# Patient Record
Sex: MALE | Race: OTHER | HISPANIC OR LATINO
[De-identification: names, ages, dates, MRNs, and addresses within clinical notes are randomized per-mention and may not be internally consistent; named-entity substitution may affect disease eponyms.]

---

## 2019-06-10 ENCOUNTER — NON-APPOINTMENT (OUTPATIENT)
Age: 59
End: 2019-06-10

## 2019-06-10 ENCOUNTER — APPOINTMENT (OUTPATIENT)
Dept: HEART AND VASCULAR | Facility: CLINIC | Age: 59
End: 2019-06-10
Payer: COMMERCIAL

## 2019-06-10 DIAGNOSIS — Z82.49 FAMILY HISTORY OF ISCHEMIC HEART DISEASE AND OTHER DISEASES OF THE CIRCULATORY SYSTEM: ICD-10-CM

## 2019-06-10 DIAGNOSIS — R93.1 ABNORMAL FINDINGS ON DIAGNOSTIC IMAGING OF HEART AND CORONARY CIRCULATION: ICD-10-CM

## 2019-06-10 DIAGNOSIS — Z87.898 PERSONAL HISTORY OF OTHER SPECIFIED CONDITIONS: ICD-10-CM

## 2019-06-10 DIAGNOSIS — Z87.2 PERSONAL HISTORY OF DISEASES OF THE SKIN AND SUBCUTANEOUS TISSUE: ICD-10-CM

## 2019-06-10 PROCEDURE — 93000 ELECTROCARDIOGRAM COMPLETE: CPT

## 2019-06-10 PROCEDURE — 99204 OFFICE O/P NEW MOD 45 MIN: CPT

## 2019-06-10 NOTE — REASON FOR VISIT
[Initial Evaluation] : an initial evaluation of [Abnormal ECG] : an abnormal ECG [Hyperlipidemia] : hyperlipidemia [Coronary Artery Disease] : coronary artery disease [Hypertension] : hypertension

## 2019-06-10 NOTE — HISTORY OF PRESENT ILLNESS
[FreeTextEntry1] : Esteban Marino is a 57 yo male who presents for Cv evaluation.  He was seen by cardiologist in the [facility].\par \par He denies cp.  He has SOB with exertion along with palps.  He denies pnd, orthopnea, edema, or loc.\par \par He is active but not exercising.  He is compliant with meds.\par \par ECG today reveals NSR, LVH, NS ST T changes.\par \par Needs EXSE, CPET, more aggressive CV risk factor management.  Collect records.

## 2019-06-10 NOTE — DISCUSSION/SUMMARY
[Left Ventricular Hypertrophy] : left ventricular hypertrophy [Myocardial Ischemia] : myocardial ischemia [Anginal Equivalent] : anginal equivalent [Coronary Artery Disease] : coronary artery disease [Possible Cardiac Ischemia (Intermd Prob)] : possible cardiac ischemia (intermediate probability) [Dietary Modification] : dietary modification [Weight Reduction] : weight reduction [Hyperlipidemia] : hyperlipidemia [Deteriorating] : deteriorating [Exercise] : exercise [Diet Modification] : diet modification [Hypertension] : hypertension [Stable] : stable [None] : none [Exercise Regimen] : an exercise regimen [Weight Loss] : weight loss [Sodium Restriction] : sodium restriction [Patient] : the patient [de-identified] : prior hx of PCI

## 2019-06-10 NOTE — PHYSICAL EXAM
[General Appearance - Well Developed] : well developed [Normal Appearance] : normal appearance [General Appearance - Well Nourished] : well nourished [No Deformities] : no deformities [Well Groomed] : well groomed [General Appearance - In No Acute Distress] : no acute distress [Normal Conjunctiva] : the conjunctiva exhibited no abnormalities [Normal Oral Mucosa] : normal oral mucosa [Eyelids - No Xanthelasma] : the eyelids demonstrated no xanthelasmas [Normal Jugular Venous A Waves Present] : normal jugular venous A waves present [No Oral Pallor] : no oral pallor [No Oral Cyanosis] : no oral cyanosis [Normal Jugular Venous V Waves Present] : normal jugular venous V waves present [No Jugular Venous Reddy A Waves] : no jugular venous reddy A waves [Heart Rate And Rhythm] : heart rate and rhythm were normal [Heart Sounds] : normal S1 and S2 [Murmurs] : no murmurs present [Respiration, Rhythm And Depth] : normal respiratory rhythm and effort [Auscultation Breath Sounds / Voice Sounds] : lungs were clear to auscultation bilaterally [Exaggerated Use Of Accessory Muscles For Inspiration] : no accessory muscle use [Abdomen Tenderness] : non-tender [Abdomen Soft] : soft [Abdomen Mass (___ Cm)] : no abdominal mass palpated [Abnormal Walk] : normal gait [Cyanosis, Localized] : no localized cyanosis [Gait - Sufficient For Exercise Testing] : the gait was sufficient for exercise testing [Nail Clubbing] : no clubbing of the fingernails [Petechial Hemorrhages (___cm)] : no petechial hemorrhages [] : no rash [Skin Color & Pigmentation] : normal skin color and pigmentation [No Venous Stasis] : no venous stasis [No Skin Ulcers] : no skin ulcer [Skin Lesions] : no skin lesions [No Xanthoma] : no  xanthoma was observed

## 2019-06-24 ENCOUNTER — APPOINTMENT (OUTPATIENT)
Dept: HEART AND VASCULAR | Facility: CLINIC | Age: 59
End: 2019-06-24

## 2019-06-28 ENCOUNTER — APPOINTMENT (OUTPATIENT)
Dept: HEART AND VASCULAR | Facility: CLINIC | Age: 59
End: 2019-06-28

## 2019-08-05 ENCOUNTER — APPOINTMENT (OUTPATIENT)
Dept: HEART AND VASCULAR | Facility: CLINIC | Age: 59
End: 2019-08-05
Payer: COMMERCIAL

## 2019-08-05 VITALS
WEIGHT: 240 LBS | SYSTOLIC BLOOD PRESSURE: 158 MMHG | DIASTOLIC BLOOD PRESSURE: 90 MMHG | HEIGHT: 65 IN | BODY MASS INDEX: 39.99 KG/M2 | HEART RATE: 82 BPM

## 2019-08-05 PROCEDURE — 93320 DOPPLER ECHO COMPLETE: CPT

## 2019-08-05 PROCEDURE — 93351 STRESS TTE COMPLETE: CPT

## 2019-08-05 PROCEDURE — 93325 DOPPLER ECHO COLOR FLOW MAPG: CPT

## 2019-08-06 ENCOUNTER — RESULT REVIEW (OUTPATIENT)
Age: 59
End: 2019-08-06

## 2019-09-03 ENCOUNTER — APPOINTMENT (OUTPATIENT)
Dept: HEART AND VASCULAR | Facility: CLINIC | Age: 59
End: 2019-09-03
Payer: COMMERCIAL

## 2019-09-03 PROCEDURE — 94010 BREATHING CAPACITY TEST: CPT | Mod: 59

## 2019-09-03 PROCEDURE — 94621 CARDIOPULM EXERCISE TESTING: CPT

## 2019-09-03 PROCEDURE — 94729 DIFFUSING CAPACITY: CPT

## 2019-09-09 ENCOUNTER — APPOINTMENT (OUTPATIENT)
Dept: HEART AND VASCULAR | Facility: CLINIC | Age: 59
End: 2019-09-09

## 2019-10-03 ENCOUNTER — APPOINTMENT (OUTPATIENT)
Dept: HEART AND VASCULAR | Facility: CLINIC | Age: 59
End: 2019-10-03
Payer: COMMERCIAL

## 2019-10-03 VITALS
RESPIRATION RATE: 16 BRPM | HEIGHT: 65 IN | WEIGHT: 246 LBS | SYSTOLIC BLOOD PRESSURE: 152 MMHG | DIASTOLIC BLOOD PRESSURE: 90 MMHG | HEART RATE: 68 BPM | BODY MASS INDEX: 40.98 KG/M2

## 2019-10-03 PROCEDURE — 99214 OFFICE O/P EST MOD 30 MIN: CPT

## 2019-10-04 LAB
ALBUMIN SERPL ELPH-MCNC: 4.5 G/DL
ALP BLD-CCNC: 116 U/L
ALT SERPL-CCNC: 53 U/L
ANION GAP SERPL CALC-SCNC: 15 MMOL/L
AST SERPL-CCNC: 30 U/L
BILIRUB SERPL-MCNC: 0.5 MG/DL
BUN SERPL-MCNC: 16 MG/DL
CALCIUM SERPL-MCNC: 10 MG/DL
CHLORIDE SERPL-SCNC: 99 MMOL/L
CO2 SERPL-SCNC: 24 MMOL/L
CREAT SERPL-MCNC: 0.97 MG/DL
GLUCOSE SERPL-MCNC: 86 MG/DL
POTASSIUM SERPL-SCNC: 3.3 MMOL/L
PROT SERPL-MCNC: 7.9 G/DL
SODIUM SERPL-SCNC: 138 MMOL/L

## 2019-10-04 NOTE — DISCUSSION/SUMMARY
[Myocardial Ischemia] : myocardial ischemia [Left Ventricular Hypertrophy] : left ventricular hypertrophy [Coronary Artery Disease] : coronary artery disease [Anginal Equivalent] : anginal equivalent [Possible Cardiac Ischemia (Intermd Prob)] : possible cardiac ischemia (intermediate probability) [Dietary Modification] : dietary modification [Weight Reduction] : weight reduction [Hyperlipidemia] : hyperlipidemia [Deteriorating] : deteriorating [Diet Modification] : diet modification [Exercise] : exercise [Hypertension] : hypertension [None] : none [Stable] : stable [Weight Loss] : weight loss [Exercise Regimen] : an exercise regimen [Sodium Restriction] : sodium restriction [Patient] : the patient [de-identified] : prior hx of PCI

## 2019-10-04 NOTE — REASON FOR VISIT
[Initial Evaluation] : an initial evaluation of [Coronary Artery Disease] : coronary artery disease [Abnormal ECG] : an abnormal ECG [Hyperlipidemia] : hyperlipidemia [Hypertension] : hypertension

## 2019-10-04 NOTE — PHYSICAL EXAM
[General Appearance - Well Developed] : well developed [Normal Appearance] : normal appearance [Well Groomed] : well groomed [No Deformities] : no deformities [General Appearance - Well Nourished] : well nourished [General Appearance - In No Acute Distress] : no acute distress [Normal Conjunctiva] : the conjunctiva exhibited no abnormalities [Eyelids - No Xanthelasma] : the eyelids demonstrated no xanthelasmas [Normal Oral Mucosa] : normal oral mucosa [No Oral Pallor] : no oral pallor [No Oral Cyanosis] : no oral cyanosis [Normal Jugular Venous A Waves Present] : normal jugular venous A waves present [Normal Jugular Venous V Waves Present] : normal jugular venous V waves present [No Jugular Venous Reddy A Waves] : no jugular venous reddy A waves [Exaggerated Use Of Accessory Muscles For Inspiration] : no accessory muscle use [Respiration, Rhythm And Depth] : normal respiratory rhythm and effort [Auscultation Breath Sounds / Voice Sounds] : lungs were clear to auscultation bilaterally [Heart Rate And Rhythm] : heart rate and rhythm were normal [Heart Sounds] : normal S1 and S2 [Murmurs] : no murmurs present [Abdomen Soft] : soft [Abdomen Tenderness] : non-tender [Abdomen Mass (___ Cm)] : no abdominal mass palpated [Abnormal Walk] : normal gait [Nail Clubbing] : no clubbing of the fingernails [Gait - Sufficient For Exercise Testing] : the gait was sufficient for exercise testing [Cyanosis, Localized] : no localized cyanosis [Petechial Hemorrhages (___cm)] : no petechial hemorrhages [Skin Color & Pigmentation] : normal skin color and pigmentation [] : no rash [No Venous Stasis] : no venous stasis [Skin Lesions] : no skin lesions [No Xanthoma] : no  xanthoma was observed [No Skin Ulcers] : no skin ulcer

## 2019-10-04 NOTE — HISTORY OF PRESENT ILLNESS
[FreeTextEntry1] : Esteban Marino returns for follow up.  \par \par He denies cp.  He has SOB with exertion along with palps.  He denies pnd, orthopnea, edema, or loc.\par \par He is active but not exercising.  He is compliant with meds.\par \par EXSE 8/2019: nl lv sys fxn; nl ruelas fxn; 7:05 min Anil; no ischemia; PVC in recovery\par CPET 9/2019:  ischemic myocardial dysfxn; 84% predicted peak VO2\par \par Reviewed results in detail.  Recommend CTA.

## 2019-10-17 ENCOUNTER — RESULT REVIEW (OUTPATIENT)
Age: 59
End: 2019-10-17

## 2019-10-18 ENCOUNTER — APPOINTMENT (OUTPATIENT)
Dept: HEART AND VASCULAR | Facility: CLINIC | Age: 59
End: 2019-10-18
Payer: COMMERCIAL

## 2019-10-18 PROCEDURE — 36415 COLL VENOUS BLD VENIPUNCTURE: CPT

## 2019-10-21 LAB
ALBUMIN SERPL ELPH-MCNC: 4.2 G/DL
ALP BLD-CCNC: 108 U/L
ALT SERPL-CCNC: 27 U/L
ANION GAP SERPL CALC-SCNC: 17 MMOL/L
APPEARANCE: CLEAR
AST SERPL-CCNC: 20 U/L
BASOPHILS # BLD AUTO: 0.08 K/UL
BASOPHILS NFR BLD AUTO: 0.6 %
BILIRUB SERPL-MCNC: 0.4 MG/DL
BILIRUBIN URINE: NEGATIVE
BLOOD URINE: NEGATIVE
BUN SERPL-MCNC: 19 MG/DL
CALCIUM SERPL-MCNC: 9.7 MG/DL
CHLORIDE SERPL-SCNC: 101 MMOL/L
CHOLEST SERPL-MCNC: 119 MG/DL
CHOLEST/HDLC SERPL: 3.3 RATIO
CK SERPL-CCNC: 126 U/L
CO2 SERPL-SCNC: 22 MMOL/L
COLOR: YELLOW
CREAT SERPL-MCNC: 0.92 MG/DL
EOSINOPHIL # BLD AUTO: 0.33 K/UL
EOSINOPHIL NFR BLD AUTO: 2.6 %
GLUCOSE QUALITATIVE U: NEGATIVE
GLUCOSE SERPL-MCNC: 71 MG/DL
HCT VFR BLD CALC: 48.5 %
HDLC SERPL-MCNC: 36 MG/DL
HGB BLD-MCNC: 15.6 G/DL
IMM GRANULOCYTES NFR BLD AUTO: 1.2 %
INR PPP: 1.07 RATIO
KETONES URINE: NEGATIVE
LDLC SERPL CALC-MCNC: 60 MG/DL
LEUKOCYTE ESTERASE URINE: NEGATIVE
LYMPHOCYTES # BLD AUTO: 3.29 K/UL
LYMPHOCYTES NFR BLD AUTO: 25.8 %
MAN DIFF?: NORMAL
MCHC RBC-ENTMCNC: 29.2 PG
MCHC RBC-ENTMCNC: 32.2 GM/DL
MCV RBC AUTO: 90.7 FL
MONOCYTES # BLD AUTO: 0.92 K/UL
MONOCYTES NFR BLD AUTO: 7.2 %
NEUTROPHILS # BLD AUTO: 7.98 K/UL
NEUTROPHILS NFR BLD AUTO: 62.6 %
NITRITE URINE: NEGATIVE
PH URINE: 6.5
PLATELET # BLD AUTO: 366 K/UL
POTASSIUM SERPL-SCNC: 3.4 MMOL/L
PROT SERPL-MCNC: 8.1 G/DL
PROTEIN URINE: NEGATIVE
PT BLD: 12.1 SEC
RBC # BLD: 5.35 M/UL
RBC # FLD: 13.3 %
SODIUM SERPL-SCNC: 140 MMOL/L
SPECIFIC GRAVITY URINE: 1.02
TRIGL SERPL-MCNC: 117 MG/DL
UROBILINOGEN URINE: NORMAL
WBC # FLD AUTO: 12.75 K/UL

## 2019-10-30 ENCOUNTER — FORM ENCOUNTER (OUTPATIENT)
Age: 59
End: 2019-10-30

## 2019-10-30 VITALS
TEMPERATURE: 98 F | HEART RATE: 20 BPM | RESPIRATION RATE: 18 BRPM | DIASTOLIC BLOOD PRESSURE: 85 MMHG | OXYGEN SATURATION: 98 % | SYSTOLIC BLOOD PRESSURE: 138 MMHG

## 2019-10-30 NOTE — H&P ADULT - ASSESSMENT
60 yo male, FHX CAD (both parents in their late 60s), PMHX Hypertension, Hyperlipidemia, known CAD s/p stent to OM2 ~ 2014 at [facility] presents for cardiac catheterization with possible intervention if clinically indicated due to CCS Angina Class II Equivalent symptoms, abnormal CTA coronaries and patient's risk factors.       Risks & benefits of procedure and alternative therapy have been explained to the patient including but not limited to: allergic reaction, bleeding w/possible need for blood transfusion, infection, renal and vascular compromise, limb damage, arrhythmia, stroke, vessel dissection/perforation, Myocardial infarction, emergent CABG. Informed consent obtained and in chart. 58 yo male, FHX CAD (both parents in their late 60s), PMHX Hypertension, Hyperlipidemia, known CAD s/p stent to OM2 ~ 2014 at [facility] presents for cardiac catheterization with possible intervention if clinically indicated due to CCS Angina Class II Equivalent symptoms, abnormal CTA coronaries and patient's risk factors.     -NS @ 75cc/hr x 4 hours ordered for pre cath fluids  -Lab values reviewed: K: 3.6 supplemented with kdur 40 Meq orally x one dose. WBC noted to be 10.69 (WBC 12.75 10/18/2019 on outpatient blood work). Denies fever, chills, N/V, diarrhea, abdominal pain or any recent sick contacts.   - x one dose ordered, Plavix 600 mg orally x one dose ordered.       Risks & benefits of procedure and alternative therapy have been explained to the patient including but not limited to: allergic reaction, bleeding w/possible need for blood transfusion, infection, renal and vascular compromise, limb damage, arrhythmia, stroke, vessel dissection/perforation, Myocardial infarction, emergent CABG. Informed consent obtained and in chart. 58 yo male, FHX CAD (both parents in their late 60s), PMHX Hypertension, Hyperlipidemia, known CAD s/p PCI 2010 at [facility] presents for cardiac catheterization with possible intervention if clinically indicated due to CCS Angina Class II Equivalent symptoms, abnormal CTA coronaries and patient's risk factors.     -NS @ 75cc/hr x 4 hours ordered for pre cath fluids  -Lab values reviewed: K: 3.6 supplemented with kdur 40 Meq orally x one dose. WBC noted to be 10.69 (WBC 12.75 10/18/2019 on outpatient blood work). Denies fever, chills, N/V, diarrhea, abdominal pain or any recent sick contacts.   - x one dose ordered, Plavix 600 mg orally x one dose ordered.   -Attempted to call [facility] for cardiac cath report this AM but no answer.     Risks & benefits of procedure and alternative therapy have been explained to the patient including but not limited to: allergic reaction, bleeding w/possible need for blood transfusion, infection, renal and vascular compromise, limb damage, arrhythmia, stroke, vessel dissection/perforation, Myocardial infarction, emergent CABG. Informed consent obtained and in chart.

## 2019-10-30 NOTE — H&P ADULT - RS GEN PE MLT RESP DETAILS PC
clear to auscultation bilaterally/respirations non-labored/breath sounds equal/normal/good air movement/airway patent

## 2019-10-30 NOTE — H&P ADULT - HISTORY OF PRESENT ILLNESS
Pt to bring in all medications    60 yo male, FHX CAD (both parents in their late 60s), PMHX Hypertension, Hyperlipidemia, CAD s/p stent to OM2 6-7 yrs ago at [facility] presents to cardiologist, Dr Cayden Lemus, endorsing dyspnea on exertion with 4 flights of stairs and chronic b/l knee pain. Denies chest pain, dizziness, palpitations, nausea, PND/orthopnea, LE edema. As per MD note in Echocardiogram 5/17/19 showed EF 54%. Mild TR, large LA, thick aortic valve, interatrial septal aneurysm and anteroseptal wall hypokinesis. CTA coronaries 10/17/19 LM less than 50% stenosis 2/2 calcified plaque. Severe stenosis in mid LAD, proximal LCX. Degree of stenosis cannot be assessed in mLCX and OM2 2/2 motion artifact. Nonobstructive CAD elsewhere.     In light of pt's risk factors, CCS Class 2 anginal symptoms, and abnormal CTA coronaries pt presents for recommended cardiac catheterization with possible intervention. 60 yo male, FHX CAD (both parents in their late 60s), PMHX Hypertension, Hyperlipidemia, known CAD s/p stent to OM2 ~ 2014 at [facility] presented to cardiologist, Dr Cayden Lemus, endorsing dyspnea on exertion with 4 flights of stairs and chronic b/l knee pain. Denies chest pain, dizziness, palpitations, nausea, PND/orthopnea, LE edema. As per MD note in Echocardiogram 5/17/19 showed EF 54%. Mild TR, large LA, thick aortic valve, interatrial septal aneurysm and anteroseptal wall hypokinesis. CTA coronaries 10/17/19 LM less than 50% stenosis 2/2 calcified plaque. Severe stenosis in mid LAD, proximal LCX. Degree of stenosis cannot be assessed in mLCX and OM2 2/2 motion artifact. Nonobstructive CAD elsewhere.     In light of pt's risk factors, CCS Class 2 anginal equivalent symptoms, and abnormal CTA coronaries pt presents for recommended cardiac catheterization with possible intervention. 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 Denies chest pain, dizziness, palpitations, nausea, PND/orthopnea, LE edema. As per MD note in Echocardiogram 5/17/19 showed EF 54%. Mild TR, large LA, thick aortic valve, interatrial septal aneurysm and anteroseptal wall hypokinesis. CTA coronaries 10/17/19 LM less than 50% stenosis 2/2 calcified plaque. Severe stenosis in mid LAD, proximal LCX. Degree of stenosis cannot be assessed in mLCX and OM2 2/2 motion artifact. Nonobstructive CAD elsewhere.     In light of pt's risk factors, CCS Class 2 anginal equivalent symptoms, and abnormal CTA coronaries pt presents for recommended cardiac catheterization with possible intervention.

## 2019-10-30 NOTE — H&P ADULT - EXTREMITIES COMMENTS
+Psoriasis flare along bilateral lower extremities: erythematous papules and plaques with a silver scale

## 2019-10-31 ENCOUNTER — OUTPATIENT (OUTPATIENT)
Dept: OUTPATIENT SERVICES | Facility: HOSPITAL | Age: 59
LOS: 1 days | Discharge: NURSING, REHAB OR LONG-TERM CARE | End: 2019-10-31
Payer: COMMERCIAL

## 2019-10-31 VITALS
RESPIRATION RATE: 18 BRPM | HEART RATE: 73 BPM | HEIGHT: 65 IN | OXYGEN SATURATION: 98 % | TEMPERATURE: 98 F | WEIGHT: 240.3 LBS | SYSTOLIC BLOOD PRESSURE: 138 MMHG | DIASTOLIC BLOOD PRESSURE: 85 MMHG

## 2019-10-31 LAB
ALBUMIN SERPL ELPH-MCNC: 4.7 G/DL — SIGNIFICANT CHANGE UP (ref 3.3–5)
ALP SERPL-CCNC: 109 U/L — SIGNIFICANT CHANGE UP (ref 40–120)
ALT FLD-CCNC: 27 U/L — SIGNIFICANT CHANGE UP (ref 10–45)
ANION GAP SERPL CALC-SCNC: 9 MMOL/L — SIGNIFICANT CHANGE UP (ref 5–17)
APPEARANCE UR: CLEAR — SIGNIFICANT CHANGE UP
APTT BLD: 36.7 SEC — HIGH (ref 27.5–36.3)
AST SERPL-CCNC: 21 U/L — SIGNIFICANT CHANGE UP (ref 10–40)
BASOPHILS # BLD AUTO: 0.06 K/UL — SIGNIFICANT CHANGE UP (ref 0–0.2)
BASOPHILS NFR BLD AUTO: 0.6 % — SIGNIFICANT CHANGE UP (ref 0–2)
BILIRUB SERPL-MCNC: 0.8 MG/DL — SIGNIFICANT CHANGE UP (ref 0.2–1.2)
BILIRUB UR-MCNC: NEGATIVE — SIGNIFICANT CHANGE UP
BLD GP AB SCN SERPL QL: NEGATIVE — SIGNIFICANT CHANGE UP
BLD GP AB SCN SERPL QL: NEGATIVE — SIGNIFICANT CHANGE UP
BUN SERPL-MCNC: 16 MG/DL — SIGNIFICANT CHANGE UP (ref 7–23)
CALCIUM SERPL-MCNC: 9.9 MG/DL — SIGNIFICANT CHANGE UP (ref 8.4–10.5)
CHLORIDE SERPL-SCNC: 98 MMOL/L — SIGNIFICANT CHANGE UP (ref 96–108)
CHOLEST SERPL-MCNC: 124 MG/DL — SIGNIFICANT CHANGE UP (ref 10–199)
CK MB CFR SERPL CALC: 2.3 NG/ML — SIGNIFICANT CHANGE UP (ref 0–6.7)
CK SERPL-CCNC: 127 U/L — SIGNIFICANT CHANGE UP (ref 30–200)
CO2 SERPL-SCNC: 29 MMOL/L — SIGNIFICANT CHANGE UP (ref 22–31)
COLOR SPEC: YELLOW — SIGNIFICANT CHANGE UP
CREAT SERPL-MCNC: 1.03 MG/DL — SIGNIFICANT CHANGE UP (ref 0.5–1.3)
DIFF PNL FLD: NEGATIVE — SIGNIFICANT CHANGE UP
EOSINOPHIL # BLD AUTO: 0.23 K/UL — SIGNIFICANT CHANGE UP (ref 0–0.5)
EOSINOPHIL NFR BLD AUTO: 2.2 % — SIGNIFICANT CHANGE UP (ref 0–6)
GLUCOSE SERPL-MCNC: 116 MG/DL — HIGH (ref 70–99)
GLUCOSE UR QL: NEGATIVE — SIGNIFICANT CHANGE UP
HBA1C BLD-MCNC: 5.4 % — SIGNIFICANT CHANGE UP (ref 4–5.6)
HCT VFR BLD CALC: 47.5 % — SIGNIFICANT CHANGE UP (ref 39–50)
HCV AB S/CO SERPL IA: 0.17 S/CO — SIGNIFICANT CHANGE UP
HCV AB SERPL-IMP: SIGNIFICANT CHANGE UP
HDLC SERPL-MCNC: 46 MG/DL — SIGNIFICANT CHANGE UP
HGB BLD-MCNC: 15.8 G/DL — SIGNIFICANT CHANGE UP (ref 13–17)
IMM GRANULOCYTES NFR BLD AUTO: 1.3 % — SIGNIFICANT CHANGE UP (ref 0–1.5)
INR BLD: 1.09 — SIGNIFICANT CHANGE UP (ref 0.88–1.16)
KETONES UR-MCNC: NEGATIVE — SIGNIFICANT CHANGE UP
LEUKOCYTE ESTERASE UR-ACNC: NEGATIVE — SIGNIFICANT CHANGE UP
LIPID PNL WITH DIRECT LDL SERPL: 66 MG/DL — SIGNIFICANT CHANGE UP
LYMPHOCYTES # BLD AUTO: 2.36 K/UL — SIGNIFICANT CHANGE UP (ref 1–3.3)
LYMPHOCYTES # BLD AUTO: 22.1 % — SIGNIFICANT CHANGE UP (ref 13–44)
MCHC RBC-ENTMCNC: 29.2 PG — SIGNIFICANT CHANGE UP (ref 27–34)
MCHC RBC-ENTMCNC: 33.3 GM/DL — SIGNIFICANT CHANGE UP (ref 32–36)
MCV RBC AUTO: 87.6 FL — SIGNIFICANT CHANGE UP (ref 80–100)
MONOCYTES # BLD AUTO: 0.83 K/UL — SIGNIFICANT CHANGE UP (ref 0–0.9)
MONOCYTES NFR BLD AUTO: 7.8 % — SIGNIFICANT CHANGE UP (ref 2–14)
NEUTROPHILS # BLD AUTO: 7.07 K/UL — SIGNIFICANT CHANGE UP (ref 1.8–7.4)
NEUTROPHILS NFR BLD AUTO: 66 % — SIGNIFICANT CHANGE UP (ref 43–77)
NITRITE UR-MCNC: NEGATIVE — SIGNIFICANT CHANGE UP
NRBC # BLD: 0 /100 WBCS — SIGNIFICANT CHANGE UP (ref 0–0)
PH UR: 8 — SIGNIFICANT CHANGE UP (ref 5–8)
PLATELET # BLD AUTO: 340 K/UL — SIGNIFICANT CHANGE UP (ref 150–400)
POTASSIUM SERPL-MCNC: 3.6 MMOL/L — SIGNIFICANT CHANGE UP (ref 3.5–5.3)
POTASSIUM SERPL-SCNC: 3.6 MMOL/L — SIGNIFICANT CHANGE UP (ref 3.5–5.3)
PROT SERPL-MCNC: 8.2 G/DL — SIGNIFICANT CHANGE UP (ref 6–8.3)
PROT UR-MCNC: NEGATIVE MG/DL — SIGNIFICANT CHANGE UP
PROTHROM AB SERPL-ACNC: 12.3 SEC — SIGNIFICANT CHANGE UP (ref 10–12.9)
RBC # BLD: 5.42 M/UL — SIGNIFICANT CHANGE UP (ref 4.2–5.8)
RBC # FLD: 12.7 % — SIGNIFICANT CHANGE UP (ref 10.3–14.5)
RH IG SCN BLD-IMP: POSITIVE — SIGNIFICANT CHANGE UP
RH IG SCN BLD-IMP: POSITIVE — SIGNIFICANT CHANGE UP
SODIUM SERPL-SCNC: 136 MMOL/L — SIGNIFICANT CHANGE UP (ref 135–145)
SP GR SPEC: 1.01 — SIGNIFICANT CHANGE UP (ref 1–1.03)
TOTAL CHOLESTEROL/HDL RATIO MEASUREMENT: 2.7 RATIO — LOW (ref 3.4–9.6)
TRIGL SERPL-MCNC: 62 MG/DL — SIGNIFICANT CHANGE UP (ref 10–149)
TSH SERPL-MCNC: 1.35 UIU/ML — SIGNIFICANT CHANGE UP (ref 0.35–4.94)
UROBILINOGEN FLD QL: 2 E.U./DL
WBC # BLD: 10.69 K/UL — HIGH (ref 3.8–10.5)
WBC # FLD AUTO: 10.69 K/UL — HIGH (ref 3.8–10.5)

## 2019-10-31 PROCEDURE — 93010 ELECTROCARDIOGRAM REPORT: CPT | Mod: 59

## 2019-10-31 PROCEDURE — C1769: CPT

## 2019-10-31 PROCEDURE — 93454 CORONARY ARTERY ANGIO S&I: CPT | Mod: 26

## 2019-10-31 PROCEDURE — 80053 COMPREHEN METABOLIC PANEL: CPT

## 2019-10-31 PROCEDURE — C1894: CPT

## 2019-10-31 PROCEDURE — C1887: CPT

## 2019-10-31 PROCEDURE — 86850 RBC ANTIBODY SCREEN: CPT

## 2019-10-31 PROCEDURE — 86803 HEPATITIS C AB TEST: CPT

## 2019-10-31 PROCEDURE — 85730 THROMBOPLASTIN TIME PARTIAL: CPT

## 2019-10-31 PROCEDURE — 82550 ASSAY OF CK (CPK): CPT

## 2019-10-31 PROCEDURE — 99205 OFFICE O/P NEW HI 60 MIN: CPT

## 2019-10-31 PROCEDURE — 83036 HEMOGLOBIN GLYCOSYLATED A1C: CPT

## 2019-10-31 PROCEDURE — 85025 COMPLETE CBC W/AUTO DIFF WBC: CPT

## 2019-10-31 PROCEDURE — 80061 LIPID PANEL: CPT

## 2019-10-31 PROCEDURE — 85610 PROTHROMBIN TIME: CPT

## 2019-10-31 PROCEDURE — 84443 ASSAY THYROID STIM HORMONE: CPT

## 2019-10-31 PROCEDURE — 99213 OFFICE O/P EST LOW 20 MIN: CPT | Mod: 25

## 2019-10-31 PROCEDURE — 93005 ELECTROCARDIOGRAM TRACING: CPT

## 2019-10-31 PROCEDURE — 93571 IV DOP VEL&/PRESS C FLO 1ST: CPT | Mod: 26,LD

## 2019-10-31 PROCEDURE — 93454 CORONARY ARTERY ANGIO S&I: CPT

## 2019-10-31 PROCEDURE — 71045 X-RAY EXAM CHEST 1 VIEW: CPT

## 2019-10-31 PROCEDURE — 71045 X-RAY EXAM CHEST 1 VIEW: CPT | Mod: 26

## 2019-10-31 PROCEDURE — 82553 CREATINE MB FRACTION: CPT

## 2019-10-31 PROCEDURE — 36415 COLL VENOUS BLD VENIPUNCTURE: CPT

## 2019-10-31 PROCEDURE — 86901 BLOOD TYPING SEROLOGIC RH(D): CPT

## 2019-10-31 PROCEDURE — 86900 BLOOD TYPING SEROLOGIC ABO: CPT

## 2019-10-31 PROCEDURE — 81003 URINALYSIS AUTO W/O SCOPE: CPT

## 2019-10-31 PROCEDURE — 93571 IV DOP VEL&/PRESS C FLO 1ST: CPT | Mod: LD

## 2019-10-31 RX ORDER — SODIUM CHLORIDE 9 MG/ML
500 INJECTION INTRAMUSCULAR; INTRAVENOUS; SUBCUTANEOUS
Refills: 0 | Status: DISCONTINUED | OUTPATIENT
Start: 2019-10-31 | End: 2019-10-31

## 2019-10-31 RX ORDER — ASPIRIN 81 MG
81 TABLET, DELAYED RELEASE (ENTERIC COATED) ORAL DAILY
Qty: 1 | Refills: 5 | Status: ACTIVE | COMMUNITY

## 2019-10-31 RX ORDER — AMLODIPINE BESYLATE 10 MG/1
10 TABLET ORAL
Refills: 0 | Status: COMPLETED | COMMUNITY
End: 2019-10-31

## 2019-10-31 RX ORDER — POTASSIUM CHLORIDE 20 MEQ
40 PACKET (EA) ORAL ONCE
Refills: 0 | Status: COMPLETED | OUTPATIENT
Start: 2019-10-31 | End: 2019-10-31

## 2019-10-31 RX ORDER — CLOPIDOGREL BISULFATE 75 MG/1
600 TABLET, FILM COATED ORAL ONCE
Refills: 0 | Status: COMPLETED | OUTPATIENT
Start: 2019-10-31 | End: 2019-10-31

## 2019-10-31 RX ORDER — ASPIRIN/CALCIUM CARB/MAGNESIUM 324 MG
243 TABLET ORAL ONCE
Refills: 0 | Status: COMPLETED | OUTPATIENT
Start: 2019-10-31 | End: 2019-10-31

## 2019-10-31 RX ORDER — CHLORHEXIDINE GLUCONATE 213 G/1000ML
1 SOLUTION TOPICAL ONCE
Refills: 0 | Status: DISCONTINUED | OUTPATIENT
Start: 2019-10-31 | End: 2019-10-31

## 2019-10-31 RX ADMIN — SODIUM CHLORIDE 75 MILLILITER(S): 9 INJECTION INTRAMUSCULAR; INTRAVENOUS; SUBCUTANEOUS at 08:00

## 2019-10-31 RX ADMIN — SODIUM CHLORIDE 75 MILLILITER(S): 9 INJECTION INTRAMUSCULAR; INTRAVENOUS; SUBCUTANEOUS at 12:06

## 2019-10-31 RX ADMIN — CLOPIDOGREL BISULFATE 600 MILLIGRAM(S): 75 TABLET, FILM COATED ORAL at 08:00

## 2019-10-31 RX ADMIN — Medication 243 MILLIGRAM(S): at 08:00

## 2019-10-31 RX ADMIN — Medication 40 MILLIEQUIVALENT(S): at 08:04

## 2019-10-31 NOTE — PROGRESS NOTE ADULT - SUBJECTIVE AND OBJECTIVE BOX
Interventional Cardiology PA Sheath Pull Note    Pt without complaints. VSS      Pre-Sheath Removal:    [ ] Right  Groin    [ ] Brachial   6Fr   [ ] Arterial sheath in place    [ ] Hematoma  - NO      [ ] Bleed - NO    Pulses:    [ ] Right    DP:  2+       Hemostasis Achieved with: 30 minutes manual pressure    Vasovagal Reaction: No    Meds Given: None      Post-Sheath Removal:    [ ] Right  Groin     [ ] Hematoma - no         [ ] Bleed - no      [ ] Bruit- no    Pulses:    [ ] Right   DP:   [ ] 2+     A/P:  s/p [ ] Dx Cath      -Continue bedrest (pt given instructions)  -Continue to monitor Interventional Cardiology PA Sheath Pull Note    Pt without complaints. VSS      Pre-Sheath Removal:    [ ] Right  Groin    [ ] Brachial   6Fr   [ ] Arterial sheath in place    [ ] Hematoma  - NO      [ ] Bleed - NO    Pulses:    [ ] Right    DP:  2+       Hemostasis Achieved with: 30 minutes manual pressure    Vasovagal Reaction: No    Meds Given: None      Post-Sheath Removal:    [ ] Right  Groin     [ ] Hematoma - no         [ ] Bleed - no      [ ] Bruit- no    Pulses:    [ ] Right   DP:   [ ] 2+   A/P:  s/p [ ] Dx Cath      -Continue bedrest (pt given instructions)  -Continue to monitor

## 2019-10-31 NOTE — H&P ADULT - ASSESSMENT
58 yo male with a PMH of psoriasis, Hypertension, Hyperlipidemia, obesity and known CAD s/p PTCA/RADHA LAD and D1 presented to cardiologist with CCS class II symptoms. s/p + Cardiac CTA and s/p cardiac cath that revealed 3 vessel CAD w/ISR.   Dr. Torres reviewed the cardiac cath imaging and reports with the patient and  his wife and discussed the case with Dr. ALEXX Lemus.  Dr. Torres discussed the risks, benefits and alternatives to surgery. Risks include but not limited to death, heart attack, bleeding, stroke, kidney problems and infection. He quoted a 1% operative mortality and complication risk.  He also discussed the various approaches, minimally invasive versus sternotomy. Dr. Torres feels the patient will benefit and is a candidate for a off pump CABG. All questions were addressed.     Plan:   PST   SDA 11/6  pt instructed to take metoprolol the morning of surgery  instructions provided re antibacterial showers and pt given 3 sponges 60 yo male with a PMH of psoriasis, Hypertension, Hyperlipidemia, obesity and known CAD s/p PTCA/RADHA LAD and D1 presented to cardiologist with CCS class II symptoms. s/p + Cardiac CTA and s/p cardiac cath that revealed 3 vessel CAD w/ISR.   Dr. Torres reviewed the cardiac cath imaging and reports with the patient and  his wife and discussed the case with Dr. ALEXX Lemus.  Dr. Torres discussed the risks, benefits and alternatives to surgery. Risks include but not limited to death, heart attack, bleeding, stroke, kidney problems and infection. He quoted a 1% operative mortality and complication risk.  He also discussed the various approaches, minimally invasive versus sternotomy. Dr. Torres feels the patient will benefit and is a candidate for a off pump CABG. All questions were addressed.     Plan:   PST   SDA 11/6  pt instructed to take metoprolol the morning of surgery  instructions provided re antibacterial showers and pt given 3 sponges      - chart reviewed, consent obtained  - T&S performed, blood products on hold for OR  - metoprolol given in SDA to patient  - plan for 9east post op

## 2019-10-31 NOTE — CONSULT NOTE ADULT - SUBJECTIVE AND OBJECTIVE BOX
Preventive Cardiology Consultation Note    Cardiologist - Dr. 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 CTA coronaries 10/17/19 LM less than 50% stenosis 2/2 calcified plaque. Severe stenosis in mid LAD, proximal LCX. Degree of stenosis cannot be assessed in mLCX and OM2 2/2 motion artifact. Nonobstructive CAD elsewhere. Patient is now s/p cardiac cath today, which revealed 3 vessel CAD. Dr. Torres consulted for evaluation for CABG.     Review of systems otherwise negative.     Lifestyle History:  Mediterranean Diet Score (9 question survey) was 5.   (8-9: optimal, 6-7: near-optimal, 4-5: suboptimal, 0-3: markedly suboptimal)  Exercise: Patient denies any regular exercise other than walking necessary for daily activities.   Smoking: Patient denies any history of smoking.   Stress: Patient denies any stress.     PAST MEDICAL & SURGICAL HISTORY:  Presence of stent in LAD coronary artery  CAD in native artery  Psoriasis  Obesity  HLD (hyperlipidemia)  HTN (hypertension)  H/O arthroscopy of knee  S/P cholecystectomy    FAMILY HISTORY:   CAD (both parents in their late 60s)     Allergies:   No Known Allergies      HOME MEDICATIONS:   Aspirin Enteric Coated 81 mg oral delayed release tablet: 1 tab(s) orally once a day (31 Oct 2019 07:48)  lisinopril-hydrochlorothiazide 10 mg-12.5 mg oral tablet: 1 tab(s) orally once a day (31 Oct 2019 07:48)  Norvasc 10 mg oral tablet: 1 tab(s) orally once a day (31 Oct 2019 07:48)  Ranexa 500 mg oral tablet, extended release: 1 tab(s) orally once a day (at bedtime) (31 Oct 2019 07:48)  rosuvastatin 40 mg oral tablet: 1 tab(s) orally once a day (31 Oct 2019 07:48)  Toprol-XL 25 mg oral tablet, extended release: 1 tab(s) orally once a day (31 Oct 2019 07:48)      PHYSICAL EXAM:  T(C): 36.9 (10-31-19 @ 16:14), Max: 36.9 (10-31-19 @ 16:14)  T(F): 98.5 (10-31-19 @ 16:14), Max: 98.5 (10-31-19 @ 16:14)  HR: 73 (10-31-19 @ 16:14) (73 - 73)  BP: 138/85 (10-31-19 @ 16:14) (138/85 - 138/85)  RR: 18 (10-31-19 @ 16:14) (18 - 18)  SpO2: 98% (10-31-19 @ 16:14) (98% - 98%)  Height (cm): 165.1 (10-31 @ 16:14)  Weight (kg): 109 (10-31 @ 16:14)  BMI (kg/m2): 40 (10-31 @ 16:14)  	  Gen- awake, conversive  Head-NCAT; eyes: no corneal arcus noted b/l; no xanthelasmas   Neck- no thyromegaly, no cervical LAD, no JVD, no carotid bruit b/l  Respiratory- good air entry b/l, no WRR  Cardiovascular- S1S2, RRR, no MRG appr, no LE edema b/l, distal pulses 2+ b/l  Gastrointestinal- no HSM, no masses  Neurology- moves all extremities, no focal deficits  Psych- normal affect, non-depressed mood  Skin- no lesions, no rashes, no xanthomas     LABS:	                        15.8   10.69 )-----------( 340      ( 31 Oct 2019 07:06 )             47.5     10-31    136  |  98  |  16  ----------------------------<  116<H>  3.6   |  29  |  1.03    Ca    9.9      31 Oct 2019 07:06    TPro  8.2  /  Alb  4.7  /  TBili  0.8  /  DBili  x   /  AST  21  /  ALT  27  /  AlkPhos  109  10-31      Hemoglobin A1C, Whole Blood: 5.4 % (10-31 @ 07:06)    Thyroid Stimulating Hormone, Serum: 1.347 uIU/mL (10-31 @ 13:32)    Cholesterol, Serum: 124 mg/dL (10-31 @ 07:06)  HDL Cholesterol, Serum: 46 mg/dL (10-31 @ 07:06)  Triglycerides, Serum: 62 mg/dL (10-31 @ 07:06)  Direct LDL: 66 mg/dL (10-31 @ 07:06)      ASSESSMENT/RECOMMENDATIONS: 	  Patient's dietary, exercise and overall lifestyle habits were reviewed. The concept of atherosclerosis and its systemic nature was discussed with a focus on the need to get all cardiovascular risk factors to goal. At this time, I would like to make the following recommendations to optimize atherosclerotic risk factors.     RECOMMENDATIONS:   Anti-platelet Therapy: APT per interventionalist recommendation.   Lipid Therapy: Patient is currently taking rosuvastatin 40mg daily and is compliant and tolerating it well. We believe this is an appropriate medication at this time, as his current LDL-C is at goal and lifestyle modifications will likely benefit him further.   Hypertension: Blood pressures during this stay were well-controlled.   Mediterranean Diet Score is 5. Some suggestions include continue incorporating 2 or more servings per day of vegetables, fruits, and whole grains. Increase intake of fish and legumes/beans to 2 or more servings per week. Aim to increase intake of healthy fats, such as olive oil and avocados, and have a handful of nuts/seeds most days. Reduce red/processed meat consumption to 2 or fewer times per week.   Exercise: Recommended gradually increasing activity to 30-45 minutes most days of the week once cleared by referring cardiologist. Cardiac rehab might benefit this patient after CABG, and is covered by major insurance plans (other than co-pays), please refer.   Medication Adherence: Patient has no issues with adherence at this time.   Smoking: This patient is a non-smoker.   Obesity/Overweight: The patient was advised about specific mechanisms such as reduced portions and increased activity for efforts toward weight loss.   Glucometabolic State: Patient's blood sugar is well-controlled at this time. HbA1c today was 5.4%.   Sleep Apnea: The patient is at low risk for sleep apnea.   Psychological Stress: The patient appears to be coping with stressors well at this time.     Thank you for the opportunity to see this patient. Please feel free to contact Prevention if there are any questions, or if you feel that your patient would benefit from continued follow-up visits with the Program.    I am acting as a scribe on behalf of Dr. Kristen Villela,    Corrina Green, [facility]  Cardiovascular Prevention     Kristen Villela MD  System Director, Cardiovascular Prevention

## 2019-10-31 NOTE — CONSULT NOTE ADULT - ASSESSMENT
58 yo male with a PMH of psoriasis, Hypertension, Hyperlipidemia, obesity and known CAD s/p PTCA/RADHA LAD and D1 presented to cardiologist with CCS class II symptoms. s/p + Cardiac CTA and s/p cardiac cath that revealed 3 vessel CAD w/ISR.   Dr. Torres reviewed the cardiac cath imaging and reports with the patient and  his wife and discussed the case with Dr. ALEXX Lemus.  Dr. Torres discussed the risks, benefits and alternatives to surgery. Risks include but not limited to death, heart attack, bleeding, stroke, kidney problems and infection. He quoted a 1% operative mortality and complication risk.  He also discussed the various approaches, minimally invasive versus sternotomy. Dr. Torres feels the patient will benefit and is a candidate for a off pump CABG. All questions were addressed.     Plan:   PST today--labs, xray, ekg. u/a  SDA 11/6

## 2019-10-31 NOTE — H&P ADULT - NSHPLABSRESULTS_GEN_ALL_CORE
Hgb A1C = 5.4  creat = 1.03  hct= 47.5  hgb= 15.8  plt= 340  WBC= 10.69  INR= 1.09  tot alb= 4.7  tot bili= 0.8    TSH= 1.347    10/31/19 Chest xray:     10/31/19 EKG: SR, 76 bpm    PFT's: 77.5%    5/17/19 Echo: EF 54%. Mild TR, large LA, thick aortic valve, interatrial septal aneurysm and anteroseptal wall hypokinesis.     10/17/19 CTA coronaries: LM less than 50% stenosis 2/2 calcified plaque. Severe stenosis in mid LAD, proximal LCX. Degree of stenosis cannot be assessed in mLCX and OM2 2/2 motion artifact. Nonobstructive CAD elsewhere.    10/31/19 Cardiac cath: 70% mid LAD, congenital AV fistual, 75% D1, 90% prox LCX ISR, 75% RPL

## 2019-10-31 NOTE — PROGRESS NOTE ADULT - SUBJECTIVE AND OBJECTIVE BOX
Interventional Cardiology PA [facility] Discharge Note    Patient without complaints. 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 8/24/2010 (per MD office note), known hx of interatrial septal aneurysm presented to cardiologist, Dr Cayden Lemus with c/o CCS Class 2 anginal equivalent symptoms, and abnormal CTA coronaries pt presents for recommended cardiac catheterization with possible intervention.  Patient is s/p diagnostic cardiac catheterization 3VCAD (mLAD 75% IFR 0.84, D1 75%, prox CX 90% ISR, RCA 75%.  CTS consulted and pre op work up started.  Patient deemed stable for discharge home as per Dr. Lemus and will follow up with Dr. Torres for further management of CAD        1.	Stable for discharge as per attending  ___Allan______ after bed rest, pt voids, groin/wrist stable and 30 minutes of ambulation.  2.	Follow-up with PMD/Cardiologist ____Brian_______ in 1-2 weeks  3.	Discharged forms signed and copies in chart Interventional Cardiology PA [facility] Discharge Note    Patient without complaints. 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  Patient is s/p diagnostic cardiac catheterization 3VCAD (mLAD 75% IFR 0.84, D1 75%, prox CX 90% ISR, RCA 75%.  CTS consulted and pre op work up performed.  Patient deemed stable for discharge home as per Dr. Lemus and will follow up with Dr. Torres for CABG.         1.	Stable for discharge as per attending  ___Allan______ after bed rest, pt voids, groin/wrist stable and 30 minutes of ambulation.  2.	Follow-up with PMD/Cardiologist ____Brian_______ in 1-2 weeks  3.	Discharged forms signed and copies in chart

## 2019-10-31 NOTE — H&P ADULT - HISTORY OF PRESENT ILLNESS
58 yo male with postive family history, presents with PMHx of psoriasis, Hypertension, Hyperlipidemia, obesity, known CAD s/p PTCA/RADHA LAD andD@ [facility] 8/24/2010 and known hx of interatrial septal aneurysm. He presented to cardiologist, Dr Cayden Lemus, endorsing dyspnea on exertion with 4 flights of stairs and chronic b/l knee pain. CCS II. Denies chest pain, dizziness, palpitations, nausea, PND/orthopnea, LE edema. Echocardiogram 5/17/19 showed EF 54%. Mild TR, large LA, thick aortic valve, interatrial septal aneurysm and anteroseptal wall hypokinesis. CTA coronaries 10/17/19 LM less than 50% stenosis 2/2 calcified plaque. Severe stenosis in mid LAD, proximal LCX. Degree of stenosis cannot be assessed in mLCX and OM2 2/2 motion artifact. Nonobstructive CAD elsewhere. 10/31/19  s/p cardiac cath that revealed 3 vessel CAD. Dr. Torres was consulted for evaluation for CABG and today the patient presents for elective surgery. 58 yo male with postive family history, presents with PMHx of psoriasis, Hypertension, Hyperlipidemia, obesity, known CAD s/p [facility] and known hx of interatrial septal aneurysm. He presented to cardiologist, Dr Cayden Lemus, endorsing dyspnea on exertion with 4 flights of stairs and chronic b/l knee pain. CCS II. Denies chest pain, dizziness, palpitations, nausea, PND/orthopnea, LE edema. Echocardiogram 5/17/19 showed EF 54%. Mild TR, large LA, thick aortic valve, interatrial septal aneurysm and anteroseptal wall hypokinesis. CTA coronaries 10/17/19 LM less than 50% stenosis 2/2 calcified plaque. Severe stenosis in mid LAD, proximal LCX. Degree of stenosis cannot be assessed in mLCX and OM2 2/2 motion artifact. Nonobstructive CAD elsewhere. 10/31/19  s/p cardiac cath that revealed 3 vessel CAD. Dr. Torres was consulted for evaluation for CABG and today the patient presents for elective surgery.   Patient seen the morning of surgery in S[facility]. He is in his usual state of health with no acute complaints. Given 25 mg PO metoprolol prior to OR

## 2019-10-31 NOTE — CONSULT NOTE ADULT - SUBJECTIVE AND OBJECTIVE BOX
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 CCS II. Denies chest pain, dizziness, palpitations, nausea, PND/orthopnea, LE edema. As per MD note in Echocardiogram 5/17/19 showed EF 54%. Mild TR, large LA, thick aortic valve, interatrial septal aneurysm and anteroseptal wall hypokinesis. CTA coronaries 10/17/19 LM less than 50% stenosis 2/2 calcified plaque. Severe stenosis in mid LAD, proximal LCX. Degree of stenosis cannot be assessed in mLCX and OM2 2/2 motion artifact. Nonobstructive CAD elsewhere. Today s/p cardiac cath that revealed 3 vessel CAD. Dr. Torres consulted for evaluation for CABG.     Review of Systems:  Other Review of Systems: All other review of systems negative, except as noted in HPI	    Review of Systems:  · General	negative	  · Skin/Breast	negative	  · Ophthalmologic	negative	  · ENMT	negative	  · Respiratory and Thorax	negative	  · Cardiovascular Symptoms	dyspnea on exertion	  · Gastrointestinal	negative	  · Genitourinary	negative	  · Musculoskeletal	negative	  · Neurological	negative	  · Psychiatric	negative	  · Hematology/Lymphatics	negative	  · Endocrine	negative	  · Allergic/Immunologic	negative	      Allergies and Intolerances:        Allergies:  	No Known Allergies:     Home Medications:   * Patient Currently Takes Medications as of 31-Oct-2019 07:48 documented in Structured Notes  · 	Aspirin Enteric Coated 81 mg oral delayed release tablet: Last Dose Taken:  , 1 tab(s) orally once a day  · 	Ranexa 500 mg oral tablet, extended release: Last Dose Taken:  , 1 tab(s) orally once a day (at bedtime)  · 	rosuvastatin 40 mg oral tablet: Last Dose Taken:  , 1 tab(s) orally once a day  · 	lisinopril-hydrochlorothiazide 10 mg-12.5 mg oral tablet: Last Dose Taken:  , 1 tab(s) orally once a day  · 	Toprol-XL 25 mg oral tablet, extended release: 1 tab(s) orally once a day  · 	Norvasc 10 mg oral tablet: Last Dose Taken:  , 1 tab(s) orally once a day    Patient History:   Social History:  Social History (marital status, living situation, occupation, tobacco use, alcohol and drug use, and sexual history): denies etoh, tobacco, or illicit drug abuse	    Tobacco Screening:  · Core Measure Site	No	    Risk Assessment:   Present on Admission:  Deep Venous Thrombosis	no	  Pulmonary Embolus	no	    Heart Failure:  Does this patient have a history of or has been diagnosed with heart failure? no.    HIV Screen (per [facility], HIV screening must be offered to every individual between ages 13 and 64)	Offered and patient declined	      T/HR/RR/BP:  · Temp (F)	98 Degrees F	  · Temp (C)	36.7 Degrees C	  · Heart Rate	  20 /min	  · Respiration Rate (breaths/min)	18 /min	  · BP Systolic	138 mm Hg	  · BP Diastolic	85 mm Hg	  · Blood Pressure - Method	electronic	  · BP Noninvasive Mean	102 mm Hg	  · SpO2 (%)	98 %	  · O2 delivery	room air	    Physical Exam:  · Constitutional	Well-developed, well nourished	  · Eyes	EOMI; PERRL; no drainage or redness	  · ENMT	No oral lesions; no gross abnormalities	  · Neck	detailed exam	  · Neck Details	normal; supple; no JVD	  · Breasts	not examined	  · Back	not examined	  · Respiratory	detailed exam	  · Respiratory Details	normal; airway patent; breath sounds equal; good air movement; respirations non-labored; clear to auscultation bilaterally	  · Cardiovascular	detailed exam	  · Cardiovascular Details	regular rate and rhythm	  · Cardiovascular Details	positive S1; positive S2	  · Gastrointestinal	detailed exam	  · GI Normal	normal; soft; nontender; no distention	  · Genitourinary	not examined	  · Rectal	not examined	  · Extremities	detailed exam	  · Extremities Details	normal; no clubbing; no cyanosis	  · Extremities Comments	+Psoriasis flare along bilateral lower extremities: erythematous papules and plaques with a silver scale	  · Vascular	detailed exam	  · Carotid Pulse	right normal; left normal; NO bruit b/l	  · Radial Pulse	right normal; left normal	  · Femoral Pulse	right normal; left normal; NO bruit b/l	  · DP Pulse	right normal; left normal	  · PT Pulse	right normal; left normal	  · Neurological	Alert & oriented; no sensory, motor or coordination deficits, normal reflexes	  · Skin	No lesions; no rash	  · Lymph Nodes	not examined	  · Musculoskeletal	not examined	  · Psychiatric	Affect and characteristics of appearance, verbalizations, behaviors are appropriate	  · Additional PE	ASA: II Mallampati: II  12 Lead EKG Sinus Rhythm @ 76 BPM with Q Waves in I and AVL.

## 2019-10-31 NOTE — H&P ADULT - NSICDXPASTMEDICALHX_GEN_ALL_CORE_FT
PAST MEDICAL HISTORY:  CAD in native artery     HLD (hyperlipidemia)     HTN (hypertension)     Obesity     Presence of stent in LAD coronary artery     Psoriasis

## 2019-11-05 NOTE — PATIENT PROFILE ADULT - ANY SIGNIFICANT CHANGE IN ABILITY TO PERFORM THE FOLLOWING ADL SINCE THE ONSET OF PRESENT ILLNESS?
Subjective


Date of Service: 08/07/19


Chief Complaint: Upper abdominal pain, cholelithiasis, cholecystitis


Subjective: No new changes (In OR.  MRCP and repeat U/S abdomen negative.  

Normal liver chemistries today.)





Physical Examination





- Vital Signs


Temperature: 97.4 F


Blood Pressure: 120/64


Pulse: 69


Respirations: 16


Pulse Ox (%): 97





Assessment And Plan





- Current Problems (Diagnosis)


(1) Cholelithiasis


Current Visit: Yes   Status: Acute   





(2) Cholecystitis


Current Visit: Yes   Status: Acute   





- Plan


REC: 1) lap lorenza as per surgery 


2) GI clinic f/u prn no

## 2019-11-06 ENCOUNTER — APPOINTMENT (OUTPATIENT)
Dept: CARDIOTHORACIC SURGERY | Facility: HOSPITAL | Age: 59
End: 2019-11-06

## 2019-11-06 ENCOUNTER — INPATIENT (INPATIENT)
Facility: HOSPITAL | Age: 59
LOS: 5 days | Discharge: HOME HEALTH | DRG: 236 | End: 2019-11-12
Attending: THORACIC SURGERY (CARDIOTHORACIC VASCULAR SURGERY) | Admitting: THORACIC SURGERY (CARDIOTHORACIC VASCULAR SURGERY)
Payer: COMMERCIAL

## 2019-11-06 DIAGNOSIS — Z98.890 OTHER SPECIFIED POSTPROCEDURAL STATES: Chronic | ICD-10-CM

## 2019-11-06 DIAGNOSIS — Z90.49 ACQUIRED ABSENCE OF OTHER SPECIFIED PARTS OF DIGESTIVE TRACT: Chronic | ICD-10-CM

## 2019-11-06 DIAGNOSIS — Z09 ENCOUNTER FOR FOLLOW-UP EXAMINATION AFTER COMPLETED TREATMENT FOR CONDITIONS OTHER THAN MALIGNANT NEOPLASM: ICD-10-CM

## 2019-11-06 DIAGNOSIS — Z00.6 ENCOUNTER FOR EXAMINATION FOR NORMAL COMPARISON AND CONTROL IN CLINICAL RESEARCH PROGRAM: ICD-10-CM

## 2019-11-06 LAB
ALBUMIN SERPL ELPH-MCNC: 3.5 G/DL — SIGNIFICANT CHANGE UP (ref 3.3–5)
ALP SERPL-CCNC: 92 U/L — SIGNIFICANT CHANGE UP (ref 40–120)
ALT FLD-CCNC: 23 U/L — SIGNIFICANT CHANGE UP (ref 10–45)
ANION GAP SERPL CALC-SCNC: 11 MMOL/L — SIGNIFICANT CHANGE UP (ref 5–17)
ANION GAP SERPL CALC-SCNC: 12 MMOL/L — SIGNIFICANT CHANGE UP (ref 5–17)
APTT BLD: 29.6 SEC — SIGNIFICANT CHANGE UP (ref 27.5–36.3)
AST SERPL-CCNC: 19 U/L — SIGNIFICANT CHANGE UP (ref 10–40)
BASOPHILS # BLD AUTO: 0.22 K/UL — HIGH (ref 0–0.2)
BASOPHILS NFR BLD AUTO: 0.8 % — SIGNIFICANT CHANGE UP (ref 0–2)
BILIRUB SERPL-MCNC: 1 MG/DL — SIGNIFICANT CHANGE UP (ref 0.2–1.2)
BUN SERPL-MCNC: 15 MG/DL — SIGNIFICANT CHANGE UP (ref 7–23)
BUN SERPL-MCNC: 18 MG/DL — SIGNIFICANT CHANGE UP (ref 7–23)
CALCIUM SERPL-MCNC: 8.3 MG/DL — LOW (ref 8.4–10.5)
CALCIUM SERPL-MCNC: 8.6 MG/DL — SIGNIFICANT CHANGE UP (ref 8.4–10.5)
CHLORIDE SERPL-SCNC: 98 MMOL/L — SIGNIFICANT CHANGE UP (ref 96–108)
CHLORIDE SERPL-SCNC: 99 MMOL/L — SIGNIFICANT CHANGE UP (ref 96–108)
CO2 SERPL-SCNC: 23 MMOL/L — SIGNIFICANT CHANGE UP (ref 22–31)
CO2 SERPL-SCNC: 23 MMOL/L — SIGNIFICANT CHANGE UP (ref 22–31)
CREAT SERPL-MCNC: 0.66 MG/DL — SIGNIFICANT CHANGE UP (ref 0.5–1.3)
CREAT SERPL-MCNC: 0.7 MG/DL — SIGNIFICANT CHANGE UP (ref 0.5–1.3)
EOSINOPHIL # BLD AUTO: 0 K/UL — SIGNIFICANT CHANGE UP (ref 0–0.5)
EOSINOPHIL NFR BLD AUTO: 0 % — SIGNIFICANT CHANGE UP (ref 0–6)
GAS PNL BLDA: SIGNIFICANT CHANGE UP
GLUCOSE SERPL-MCNC: 164 MG/DL — HIGH (ref 70–99)
GLUCOSE SERPL-MCNC: 167 MG/DL — HIGH (ref 70–99)
HCT VFR BLD CALC: 38.9 % — LOW (ref 39–50)
HCT VFR BLD CALC: 40.3 % — SIGNIFICANT CHANGE UP (ref 39–50)
HGB BLD-MCNC: 13.5 G/DL — SIGNIFICANT CHANGE UP (ref 13–17)
HGB BLD-MCNC: 14 G/DL — SIGNIFICANT CHANGE UP (ref 13–17)
INR BLD: 1.38 — HIGH (ref 0.88–1.16)
LACTATE SERPL-SCNC: 1.1 MMOL/L — SIGNIFICANT CHANGE UP (ref 0.5–2)
LYMPHOCYTES # BLD AUTO: 1.68 K/UL — SIGNIFICANT CHANGE UP (ref 1–3.3)
LYMPHOCYTES # BLD AUTO: 6 % — LOW (ref 13–44)
MAGNESIUM SERPL-MCNC: 1.6 MG/DL — SIGNIFICANT CHANGE UP (ref 1.6–2.6)
MAGNESIUM SERPL-MCNC: 2.9 MG/DL — HIGH (ref 1.6–2.6)
MCHC RBC-ENTMCNC: 29.7 PG — SIGNIFICANT CHANGE UP (ref 27–34)
MCHC RBC-ENTMCNC: 29.8 PG — SIGNIFICANT CHANGE UP (ref 27–34)
MCHC RBC-ENTMCNC: 34.7 GM/DL — SIGNIFICANT CHANGE UP (ref 32–36)
MCHC RBC-ENTMCNC: 34.7 GM/DL — SIGNIFICANT CHANGE UP (ref 32–36)
MCV RBC AUTO: 85.5 FL — SIGNIFICANT CHANGE UP (ref 80–100)
MCV RBC AUTO: 85.7 FL — SIGNIFICANT CHANGE UP (ref 80–100)
MONOCYTES # BLD AUTO: 0.73 K/UL — SIGNIFICANT CHANGE UP (ref 0–0.9)
MONOCYTES NFR BLD AUTO: 2.6 % — SIGNIFICANT CHANGE UP (ref 2–14)
NEUTROPHILS # BLD AUTO: 25.12 K/UL — HIGH (ref 1.8–7.4)
NEUTROPHILS NFR BLD AUTO: 86.3 % — HIGH (ref 43–77)
NRBC # BLD: 0 /100 WBCS — SIGNIFICANT CHANGE UP (ref 0–0)
PHOSPHATE SERPL-MCNC: 3 MG/DL — SIGNIFICANT CHANGE UP (ref 2.5–4.5)
PHOSPHATE SERPL-MCNC: 3.6 MG/DL — SIGNIFICANT CHANGE UP (ref 2.5–4.5)
PLATELET # BLD AUTO: 281 K/UL — SIGNIFICANT CHANGE UP (ref 150–400)
PLATELET # BLD AUTO: 308 K/UL — SIGNIFICANT CHANGE UP (ref 150–400)
POTASSIUM SERPL-MCNC: 3.3 MMOL/L — LOW (ref 3.5–5.3)
POTASSIUM SERPL-MCNC: 4.1 MMOL/L — SIGNIFICANT CHANGE UP (ref 3.5–5.3)
POTASSIUM SERPL-SCNC: 3.3 MMOL/L — LOW (ref 3.5–5.3)
POTASSIUM SERPL-SCNC: 4.1 MMOL/L — SIGNIFICANT CHANGE UP (ref 3.5–5.3)
PROT SERPL-MCNC: 6.5 G/DL — SIGNIFICANT CHANGE UP (ref 6–8.3)
PROTHROM AB SERPL-ACNC: 15.7 SEC — HIGH (ref 10–12.9)
RBC # BLD: 4.55 M/UL — SIGNIFICANT CHANGE UP (ref 4.2–5.8)
RBC # BLD: 4.7 M/UL — SIGNIFICANT CHANGE UP (ref 4.2–5.8)
RBC # FLD: 12.5 % — SIGNIFICANT CHANGE UP (ref 10.3–14.5)
RBC # FLD: 12.7 % — SIGNIFICANT CHANGE UP (ref 10.3–14.5)
SODIUM SERPL-SCNC: 132 MMOL/L — LOW (ref 135–145)
SODIUM SERPL-SCNC: 134 MMOL/L — LOW (ref 135–145)
WBC # BLD: 26.36 K/UL — HIGH (ref 3.8–10.5)
WBC # BLD: 28.01 K/UL — HIGH (ref 3.8–10.5)
WBC # FLD AUTO: 26.36 K/UL — HIGH (ref 3.8–10.5)
WBC # FLD AUTO: 28.01 K/UL — HIGH (ref 3.8–10.5)

## 2019-11-06 PROCEDURE — 33535 CABG ARTERIAL THREE: CPT

## 2019-11-06 PROCEDURE — 99233 SBSQ HOSP IP/OBS HIGH 50: CPT

## 2019-11-06 PROCEDURE — 99291 CRITICAL CARE FIRST HOUR: CPT

## 2019-11-06 PROCEDURE — 76998 US GUIDE INTRAOP: CPT | Mod: 26,AS

## 2019-11-06 PROCEDURE — 93010 ELECTROCARDIOGRAM REPORT: CPT

## 2019-11-06 PROCEDURE — 76998 US GUIDE INTRAOP: CPT | Mod: 26,59

## 2019-11-06 PROCEDURE — 33533 CABG ARTERIAL SINGLE: CPT | Mod: AS

## 2019-11-06 PROCEDURE — 71045 X-RAY EXAM CHEST 1 VIEW: CPT | Mod: 26

## 2019-11-06 PROCEDURE — 99292 CRITICAL CARE ADDL 30 MIN: CPT

## 2019-11-06 RX ORDER — PANTOPRAZOLE SODIUM 20 MG/1
40 TABLET, DELAYED RELEASE ORAL DAILY
Refills: 0 | Status: DISCONTINUED | OUTPATIENT
Start: 2019-11-06 | End: 2019-11-08

## 2019-11-06 RX ORDER — MAGNESIUM SULFATE 500 MG/ML
2 VIAL (ML) INJECTION
Refills: 0 | Status: COMPLETED | OUTPATIENT
Start: 2019-11-06 | End: 2019-11-06

## 2019-11-06 RX ORDER — NICARDIPINE HYDROCHLORIDE 30 MG/1
5 CAPSULE, EXTENDED RELEASE ORAL
Qty: 40 | Refills: 0 | Status: DISCONTINUED | OUTPATIENT
Start: 2019-11-06 | End: 2019-11-07

## 2019-11-06 RX ORDER — POTASSIUM CHLORIDE 20 MEQ
20 PACKET (EA) ORAL
Refills: 0 | Status: COMPLETED | OUTPATIENT
Start: 2019-11-06 | End: 2019-11-06

## 2019-11-06 RX ORDER — ASPIRIN/CALCIUM CARB/MAGNESIUM 324 MG
81 TABLET ORAL DAILY
Refills: 0 | Status: DISCONTINUED | OUTPATIENT
Start: 2019-11-06 | End: 2019-11-12

## 2019-11-06 RX ORDER — METOPROLOL TARTRATE 50 MG
25 TABLET ORAL ONCE
Refills: 0 | Status: COMPLETED | OUTPATIENT
Start: 2019-11-06 | End: 2019-11-06

## 2019-11-06 RX ORDER — RANOLAZINE 500 MG/1
1 TABLET, FILM COATED, EXTENDED RELEASE ORAL
Qty: 0 | Refills: 0 | DISCHARGE

## 2019-11-06 RX ORDER — CEFAZOLIN SODIUM 1 G
2000 VIAL (EA) INJECTION EVERY 8 HOURS
Refills: 0 | Status: COMPLETED | OUTPATIENT
Start: 2019-11-06 | End: 2019-11-08

## 2019-11-06 RX ORDER — INSULIN HUMAN 100 [IU]/ML
1 INJECTION, SOLUTION SUBCUTANEOUS
Qty: 50 | Refills: 0 | Status: DISCONTINUED | OUTPATIENT
Start: 2019-11-06 | End: 2019-11-08

## 2019-11-06 RX ORDER — CHLORHEXIDINE GLUCONATE 213 G/1000ML
15 SOLUTION TOPICAL EVERY 12 HOURS
Refills: 0 | Status: DISCONTINUED | OUTPATIENT
Start: 2019-11-06 | End: 2019-11-08

## 2019-11-06 RX ORDER — CHLORHEXIDINE GLUCONATE 213 G/1000ML
5 SOLUTION TOPICAL EVERY 4 HOURS
Refills: 0 | Status: DISCONTINUED | OUTPATIENT
Start: 2019-11-06 | End: 2019-11-08

## 2019-11-06 RX ORDER — ALBUMIN HUMAN 25 %
250 VIAL (ML) INTRAVENOUS ONCE
Refills: 0 | Status: COMPLETED | OUTPATIENT
Start: 2019-11-06 | End: 2019-11-06

## 2019-11-06 RX ORDER — HEPARIN SODIUM 5000 [USP'U]/ML
5000 INJECTION INTRAVENOUS; SUBCUTANEOUS EVERY 8 HOURS
Refills: 0 | Status: DISCONTINUED | OUTPATIENT
Start: 2019-11-06 | End: 2019-11-11

## 2019-11-06 RX ORDER — CLOPIDOGREL BISULFATE 75 MG/1
75 TABLET, FILM COATED ORAL DAILY
Refills: 0 | Status: DISCONTINUED | OUTPATIENT
Start: 2019-11-06 | End: 2019-11-12

## 2019-11-06 RX ORDER — CALCIUM GLUCONATE 100 MG/ML
2 VIAL (ML) INTRAVENOUS ONCE
Refills: 0 | Status: COMPLETED | OUTPATIENT
Start: 2019-11-06 | End: 2019-11-06

## 2019-11-06 RX ORDER — DEXTROSE 50 % IN WATER 50 %
50 SYRINGE (ML) INTRAVENOUS
Refills: 0 | Status: DISCONTINUED | OUTPATIENT
Start: 2019-11-06 | End: 2019-11-08

## 2019-11-06 RX ORDER — MEPERIDINE HYDROCHLORIDE 50 MG/ML
25 INJECTION INTRAMUSCULAR; INTRAVENOUS; SUBCUTANEOUS ONCE
Refills: 0 | Status: DISCONTINUED | OUTPATIENT
Start: 2019-11-06 | End: 2019-11-08

## 2019-11-06 RX ORDER — ATORVASTATIN CALCIUM 80 MG/1
40 TABLET, FILM COATED ORAL AT BEDTIME
Refills: 0 | Status: DISCONTINUED | OUTPATIENT
Start: 2019-11-06 | End: 2019-11-12

## 2019-11-06 RX ORDER — ACETAMINOPHEN 500 MG
1000 TABLET ORAL ONCE
Refills: 0 | Status: COMPLETED | OUTPATIENT
Start: 2019-11-06 | End: 2019-11-07

## 2019-11-06 RX ORDER — CEFAZOLIN SODIUM 1 G
2000 VIAL (EA) INJECTION EVERY 8 HOURS
Refills: 0 | Status: DISCONTINUED | OUTPATIENT
Start: 2019-11-06 | End: 2019-11-06

## 2019-11-06 RX ORDER — DEXTROSE 50 % IN WATER 50 %
25 SYRINGE (ML) INTRAVENOUS
Refills: 0 | Status: DISCONTINUED | OUTPATIENT
Start: 2019-11-06 | End: 2019-11-08

## 2019-11-06 RX ORDER — SODIUM CHLORIDE 9 MG/ML
1000 INJECTION INTRAMUSCULAR; INTRAVENOUS; SUBCUTANEOUS
Refills: 0 | Status: DISCONTINUED | OUTPATIENT
Start: 2019-11-06 | End: 2019-11-08

## 2019-11-06 RX ADMIN — Medication 200 GRAM(S): at 23:03

## 2019-11-06 RX ADMIN — Medication 50 GRAM(S): at 18:00

## 2019-11-06 RX ADMIN — Medication 125 MILLILITER(S): at 22:10

## 2019-11-06 RX ADMIN — PANTOPRAZOLE SODIUM 40 MILLIGRAM(S): 20 TABLET, DELAYED RELEASE ORAL at 18:44

## 2019-11-06 RX ADMIN — Medication 50 GRAM(S): at 19:02

## 2019-11-06 RX ADMIN — CHLORHEXIDINE GLUCONATE 5 MILLILITER(S): 213 SOLUTION TOPICAL at 22:20

## 2019-11-06 RX ADMIN — Medication 2000 MILLIGRAM(S): at 22:06

## 2019-11-06 RX ADMIN — CLOPIDOGREL BISULFATE 75 MILLIGRAM(S): 75 TABLET, FILM COATED ORAL at 22:20

## 2019-11-06 RX ADMIN — ATORVASTATIN CALCIUM 40 MILLIGRAM(S): 80 TABLET, FILM COATED ORAL at 22:20

## 2019-11-06 RX ADMIN — Medication 100 MILLIEQUIVALENT(S): at 18:00

## 2019-11-06 RX ADMIN — Medication 81 MILLIGRAM(S): at 22:20

## 2019-11-06 RX ADMIN — CHLORHEXIDINE GLUCONATE 5 MILLILITER(S): 213 SOLUTION TOPICAL at 18:44

## 2019-11-06 RX ADMIN — Medication 100 MILLIEQUIVALENT(S): at 19:02

## 2019-11-06 RX ADMIN — Medication 25 MILLIGRAM(S): at 11:08

## 2019-11-06 NOTE — PROGRESS NOTE ADULT - SUBJECTIVE AND OBJECTIVE BOX
CTICU  CRITICAL  CARE  attending     Hand off received 					   Pertinent clinical, laboratory, radiographic, hemodynamic, echocardiographic, respiratory data, microbiologic data and chart were reviewed and analyzed frequently throughout the course of the day and night    Patient seen and examined with CTS/ SH attending at bedside    58 yo male with postive family history, presents with PMHx of psoriasis, Hypertension, Hyperlipidemia, obesity, known CAD s/p PTCA/RADHA LAD andD@ [facility] 8/24/2010 and known hx of interatrial septal aneurysm. He presented to cardiologist, Dr Cayden Lemus, endorsing dyspnea on exertion with 4 flights of stairs and chronic b/l knee pain. CCS II. Denies chest pain, dizziness, palpitations, nausea, PND/orthopnea, LE edema. Echocardiogram 5/17/19 showed EF 54%. Mild TR, large LA, thick aortic valve, interatrial septal aneurysm and anteroseptal wall hypokinesis. CTA coronaries 10/17/19 LM less than 50% stenosis 2/2 calcified plaque. Severe stenosis in mid LAD, proximal LCX. Degree of stenosis cannot be assessed in mLCX and OM2 2/2 motion artifact.   10/31/19   cardiac cath that revealed 3 vessel CAD.   Dr. Torres was consulted for evaluation for CABG.  The patient presents for elective surgery.    FAMILY HISTORY:  FH: heart disease  PAST MEDICAL & SURGICAL HISTORY:  Presence of stent in LAD coronary artery  CAD in native artery  Psoriasis  Obesity  HLD (hyperlipidemia)  HTN (hypertension)  H/O arthroscopy of knee  S/P cholecystectomy    Patient is a 59y old  Male who presents with CAD     14 system review was unremarkable    Vital signs, hemodynamic and respiratory parameters were reviewed from the bedside nursing flow sheet.  ICU Vital Signs Last 24 Hrs  T(C): 36.1 (06 Nov 2019 17:01), Max: 36.1 (06 Nov 2019 17:01)  T(F): 97 (06 Nov 2019 17:01), Max: 97 (06 Nov 2019 17:01)  HR: 74 (06 Nov 2019 21:00) (70 - 86)  BP: --  BP(mean): --  ABP: 112/64 (06 Nov 2019 21:00) (103/56 - 140/80)  ABP(mean): 82 (06 Nov 2019 21:00) (74 - 102)  RR: 19 (06 Nov 2019 21:00) (12 - 30)  SpO2: 97% (06 Nov 2019 21:00) (95% - 100%)    Adult Advanced Hemodynamics Last 24 Hrs  CVP(mm Hg): 5 (06 Nov 2019 21:00) (5 - 13)  CVP(cm H2O): --  CO: --  CI: --  PA: --  PA(mean): --  PCWP: --  SVR: --  SVRI: --  PVR: --  PVRI: --, ABG - ( 06 Nov 2019 21:03 )  pH, Arterial: 7.41  pH, Blood: x     /  pCO2: 37    /  pO2: 91    / HCO3: 23    / Base Excess: -1.3  /  SaO2: 97                Mode: AC/ CMV (Assist Control/ Continuous Mandatory Ventilation)  RR (machine): 12  TV (machine): 600  FiO2: 50  PEEP: 5  ITime: 0.95  MAP: 10  PIP: 25    Intake and output was reviewed and the fluid balance was calculated  Daily     Daily   I&O's Summary    06 Nov 2019 07:01  -  06 Nov 2019 22:02  --------------------------------------------------------  IN: 385 mL / OUT: 450 mL / NET: -65 mL        All lines and drain sites were assessed    Neuro: No change in the mental status from the baseline. Moves all 4 extremities.  Neck: No JVD.  CVS: S1, S1, No S3.  Lungs: Good air entry bilaterally.  Abd: Soft. No tenderness. + Bowel sounds.  Vascular: + DP/PT.  Extremities: No edema.  Lymphatic: Normal.  Skin: No abnormalities.      labs  CBC Full  -  ( 06 Nov 2019 21:04 )  WBC Count : 26.36 K/uL  RBC Count : 4.55 M/uL  Hemoglobin : 13.5 g/dL  Hematocrit : 38.9 %  Platelet Count - Automated : 308 K/uL  Mean Cell Volume : 85.5 fl  Mean Cell Hemoglobin : 29.7 pg  Mean Cell Hemoglobin Concentration : 34.7 gm/dL  Auto Neutrophil # : x  Auto Lymphocyte # : x  Auto Monocyte # : x  Auto Eosinophil # : x  Auto Basophil # : x  Auto Neutrophil % : x  Auto Lymphocyte % : x  Auto Monocyte % : x  Auto Eosinophil % : x  Auto Basophil % : x    11-06    132<L>  |  98  |  18  ----------------------------<  167<H>  4.1   |  23  |  0.70    Ca    8.3<L>      06 Nov 2019 21:04  Phos  3.6     11-06  Mg     2.9     11-06    TPro  6.5  /  Alb  3.5  /  TBili  1.0  /  DBili  x   /  AST  19  /  ALT  23  /  AlkPhos  92  11-06    PT/INR - ( 06 Nov 2019 17:17 )   PT: 15.7 sec;   INR: 1.38          PTT - ( 06 Nov 2019 17:17 )  PTT:29.6 sec  The current medications were reviewed   MEDICATIONS  (STANDING):  acetaminophen  IVPB .. 1000 milliGRAM(s) IV Intermittent once  albumin human  5% IVPB 250 milliLiter(s) IV Intermittent once  aspirin enteric coated 81 milliGRAM(s) Oral daily  atorvastatin 40 milliGRAM(s) Oral at bedtime  calcium gluconate IVPB 2 Gram(s) IV Intermittent once  ceFAZolin  Injectable. 2000 milliGRAM(s) IV Push every 8 hours  chlorhexidine 0.12% Liquid 5 milliLiter(s) Oral Mucosa every 4 hours  chlorhexidine 0.12% Liquid 15 milliLiter(s) Oral Mucosa every 12 hours  clopidogrel Tablet 75 milliGRAM(s) Oral daily  dextrose 50% Injectable 50 milliLiter(s) IV Push every 15 minutes  dextrose 50% Injectable 25 milliLiter(s) IV Push every 15 minutes  heparin  Injectable 5000 Unit(s) SubCutaneous every 8 hours  insulin regular Infusion 1 Unit(s)/Hr (1 mL/Hr) IV Continuous <Continuous>  meperidine     Injectable 25 milliGRAM(s) IV Push once  niCARdipine Infusion 5 mG/Hr (25 mL/Hr) IV Continuous <Continuous>  pantoprazole  Injectable 40 milliGRAM(s) IV Push daily  sodium chloride 0.9%. 1000 milliLiter(s) (10 mL/Hr) IV Continuous <Continuous>        Patient is a 59y old  Male who presents with CAD.  S/P CABG  Hemodynamically stable.  Good oxygenation.  Fair urine out put.  Overall doing well.      My plan includes :  Statin and Betablocker.  Dual antiplatelet Rx.  Close hemodynamic, ventilatory and drain monitoring and management  Monitor for arrhythmias and monitor parameters for organ perfusion  Monitor neurologic status  Monitor renal function.  Head of the bed should remain elevated to 45 deg .   Chest PT and IS will be encouraged  Monitor adequacy of oxygenation and ventilation and attempt to wean oxygen  Nutritional goals will be met using po eventually , ensure adequate caloric intake and monitor the same  Stress ulcer and VTE prophylaxis will be achieved    OPTIMIZE Glycemic control.  Electrolytes have been repleted as necessary and wound care has been carried out. Pain control has been achieved.   Aggressive physical therapy and early mobility and ambulation goals will be met   The family was updated about the course and plan  CRITICAL CARE TIME SPENT in evaluation and management, reassessments, review and interpretation of labs and x-rays, ventilator and hemodynamic management, formulating a plan and coordinating care: ___60____ MIN.  Time does not include procedural time.  CTICU ATTENDING     					    Eagle Castellano MD

## 2019-11-06 NOTE — PROGRESS NOTE ADULT - SUBJECTIVE AND OBJECTIVE BOX
CTICU  CRITICAL  CARE  attending     Hand off received 					   Pertinent clinical, laboratory, radiographic, hemodynamic, echocardiographic, respiratory data, microbiologic data and chart were reviewed and analyzed frequently throughout the course of the day and night  Patient seen and examined with CTS/ SH attending at bedside  Pt is a 59y , Male, HEALTH ISSUES - PROBLEM Dx:      , FAMILY HISTORY:  FH: heart disease  PAST MEDICAL & SURGICAL HISTORY:  Presence of stent in LAD coronary artery  CAD in native artery  Psoriasis  Obesity  HLD (hyperlipidemia)  HTN (hypertension)  H/O arthroscopy of knee  S/P cholecystectomy    Patient is a 59y old  Male who presents with a chief complaint of CAD (06 Nov 2019 22:01)      14 system review was unremarkable    Vital signs, hemodynamic and respiratory parameters were reviewed from the bedside nursing flowsheet.  ICU Vital Signs Last 24 Hrs  T(C): 36.5 (07 Nov 2019 05:01), Max: 36.5 (06 Nov 2019 22:38)  T(F): 97.7 (07 Nov 2019 05:01), Max: 97.7 (06 Nov 2019 22:38)  HR: 82 (07 Nov 2019 09:00) (70 - 88)  BP: 98/56 (07 Nov 2019 09:00) (95/62 - 101/71)  BP(mean): 75 (07 Nov 2019 09:00) (73 - 80)  ABP: 94/58 (07 Nov 2019 06:00) (92/52 - 140/80)  ABP(mean): 72 (07 Nov 2019 06:00) (66 - 102)  RR: 18 (07 Nov 2019 09:00) (12 - 30)  SpO2: 93% (07 Nov 2019 09:00) (90% - 100%)    Adult Advanced Hemodynamics Last 24 Hrs  CVP(mm Hg): 15 (07 Nov 2019 09:00) (0 - 18)  CVP(cm H2O): --  CO: --  CI: --  PA: --  PA(mean): --  PCWP: --  SVR: --  SVRI: --  PVR: --  PVRI: --, ABG - ( 07 Nov 2019 02:35 )  pH, Arterial: 7.43  pH, Blood: x     /  pCO2: 36    /  pO2: 97    / HCO3: 23    / Base Excess: -0.6  /  SaO2: 97                Mode: CPAP with PS  FiO2: 40  PEEP: 5  PS: 10  MAP: 8  PIP: 16    Intake and output was reviewed and the fluid balance was calculated  Daily     Daily   I&O's Summary    06 Nov 2019 07:01  -  07 Nov 2019 07:00  --------------------------------------------------------  IN: 1925 mL / OUT: 1035 mL / NET: 890 mL    07 Nov 2019 07:01  -  07 Nov 2019 09:26  --------------------------------------------------------  IN: 510 mL / OUT: 225 mL / NET: 285 mL        All lines and drain sites were assessed  Glycemic trend was reviewedCAPILLARY BLOOD GLUCOSE        No acute change in mental status  Auscultation of the chest reveals equal bs  Abdomen is soft  Extremities are warm and well perfused  Wounds appear clean and unremarkable  Antibiotics are periop    labs  CBC Full  -  ( 07 Nov 2019 02:40 )  WBC Count : 22.05 K/uL  RBC Count : 4.19 M/uL  Hemoglobin : 12.4 g/dL  Hematocrit : 35.9 %  Platelet Count - Automated : 329 K/uL  Mean Cell Volume : 85.7 fl  Mean Cell Hemoglobin : 29.6 pg  Mean Cell Hemoglobin Concentration : 34.5 gm/dL  Auto Neutrophil # : x  Auto Lymphocyte # : x  Auto Monocyte # : x  Auto Eosinophil # : x  Auto Basophil # : x  Auto Neutrophil % : x  Auto Lymphocyte % : x  Auto Monocyte % : x  Auto Eosinophil % : x  Auto Basophil % : x    11-07    131<L>  |  99  |  18  ----------------------------<  144<H>  4.0   |  25  |  0.72    Ca    8.8      07 Nov 2019 02:40  Phos  3.5     11-07  Mg     2.4     11-07    TPro  6.5  /  Alb  3.5  /  TBili  1.0  /  DBili  x   /  AST  19  /  ALT  23  /  AlkPhos  92  11-06    PT/INR - ( 07 Nov 2019 02:40 )   PT: 14.4 sec;   INR: 1.27          PTT - ( 07 Nov 2019 02:40 )  PTT:21.4 sec  The current medications were reviewed   MEDICATIONS  (STANDING):  albumin human  5% IVPB 250 milliLiter(s) IV Intermittent once  albumin human  5% IVPB 250 milliLiter(s) IV Intermittent once  aspirin enteric coated 81 milliGRAM(s) Oral daily  atorvastatin 40 milliGRAM(s) Oral at bedtime  ceFAZolin  Injectable. 2000 milliGRAM(s) IV Push every 8 hours  chlorhexidine 0.12% Liquid 5 milliLiter(s) Oral Mucosa every 4 hours  chlorhexidine 0.12% Liquid 15 milliLiter(s) Oral Mucosa every 12 hours  clopidogrel Tablet 75 milliGRAM(s) Oral daily  dextrose 50% Injectable 50 milliLiter(s) IV Push every 15 minutes  dextrose 50% Injectable 25 milliLiter(s) IV Push every 15 minutes  heparin  Injectable 5000 Unit(s) SubCutaneous every 8 hours  insulin regular Infusion 1 Unit(s)/Hr (1 mL/Hr) IV Continuous <Continuous>  lactated ringers Bolus 1000 milliLiter(s) IV Bolus once  meperidine     Injectable 25 milliGRAM(s) IV Push once  pantoprazole  Injectable 40 milliGRAM(s) IV Push daily  sodium chloride 0.9%. 1000 milliLiter(s) (10 mL/Hr) IV Continuous <Continuous>    MEDICATIONS  (PRN):       PROBLEM LIST/ ASSESSMENT:  HEALTH ISSUES - PROBLEM Dx:      ,   Patient is a 59y old  Male who presents with a chief complaint of CAD (06 Nov 2019 22:01)     s/p cardiac surgery              My plan includes :  close hemodynamic, ventilatory and drain monitoring and management per post op routine    Monitor for arrhythmias and monitor parameters for organ perfusion  beta blockade not administered due to hemodynamic instability and bradycardia  monitor neurologic status  Head of the bed should remain elevated to 45 deg .   chest PT and IS will be encouraged  monitor adequacy of oxygenation and ventilation and attempt to wean oxygen  antibiotic regimen will be tailored to the clinical, laboratory and microbiologic data  Nutritional goals will be met using po eventually , ensure adequate caloric intake and montior the same  Stress ulcer and VTE prophylaxis will be achieved    Glycemic control is satisfactory  Electrolytes have been repleted as necessary and wound care has been carried out. Pain control has been achieved.   agressive physical therapy and early mobility and ambulation goals will be met   The family was updated about the course and plan  CRITICAL CARE TIME personally provided by me  in evaluation and management, reassessments, review and interpretation of labs and x-rays, ventilator and hemodynamic management, formulating a plan and coordinating care: ___90____ MIN.  Time does not include procedural time.  CTICU ATTENDING     					    Jeremy Baum MD

## 2019-11-07 LAB
ANION GAP SERPL CALC-SCNC: 7 MMOL/L — SIGNIFICANT CHANGE UP (ref 5–17)
ANION GAP SERPL CALC-SCNC: 8 MMOL/L — SIGNIFICANT CHANGE UP (ref 5–17)
APTT BLD: 21.4 SEC — LOW (ref 27.5–36.3)
APTT BLD: 31.1 SEC — SIGNIFICANT CHANGE UP (ref 27.5–36.3)
BUN SERPL-MCNC: 17 MG/DL — SIGNIFICANT CHANGE UP (ref 7–23)
BUN SERPL-MCNC: 18 MG/DL — SIGNIFICANT CHANGE UP (ref 7–23)
CALCIUM SERPL-MCNC: 8.3 MG/DL — LOW (ref 8.4–10.5)
CALCIUM SERPL-MCNC: 8.8 MG/DL — SIGNIFICANT CHANGE UP (ref 8.4–10.5)
CHLORIDE SERPL-SCNC: 97 MMOL/L — SIGNIFICANT CHANGE UP (ref 96–108)
CHLORIDE SERPL-SCNC: 99 MMOL/L — SIGNIFICANT CHANGE UP (ref 96–108)
CO2 SERPL-SCNC: 25 MMOL/L — SIGNIFICANT CHANGE UP (ref 22–31)
CO2 SERPL-SCNC: 28 MMOL/L — SIGNIFICANT CHANGE UP (ref 22–31)
CREAT SERPL-MCNC: 0.72 MG/DL — SIGNIFICANT CHANGE UP (ref 0.5–1.3)
CREAT SERPL-MCNC: 0.81 MG/DL — SIGNIFICANT CHANGE UP (ref 0.5–1.3)
GAS PNL BLDA: SIGNIFICANT CHANGE UP
GLUCOSE SERPL-MCNC: 120 MG/DL — HIGH (ref 70–99)
GLUCOSE SERPL-MCNC: 144 MG/DL — HIGH (ref 70–99)
HCT VFR BLD CALC: 32.3 % — LOW (ref 39–50)
HCT VFR BLD CALC: 35.9 % — LOW (ref 39–50)
HGB BLD-MCNC: 11.1 G/DL — LOW (ref 13–17)
HGB BLD-MCNC: 12.4 G/DL — LOW (ref 13–17)
INR BLD: 1.27 — HIGH (ref 0.88–1.16)
INR BLD: 1.35 — HIGH (ref 0.88–1.16)
LACTATE SERPL-SCNC: 1.4 MMOL/L — SIGNIFICANT CHANGE UP (ref 0.5–2)
MAGNESIUM SERPL-MCNC: 2.2 MG/DL — SIGNIFICANT CHANGE UP (ref 1.6–2.6)
MAGNESIUM SERPL-MCNC: 2.4 MG/DL — SIGNIFICANT CHANGE UP (ref 1.6–2.6)
MCHC RBC-ENTMCNC: 29.6 PG — SIGNIFICANT CHANGE UP (ref 27–34)
MCHC RBC-ENTMCNC: 29.8 PG — SIGNIFICANT CHANGE UP (ref 27–34)
MCHC RBC-ENTMCNC: 34.4 GM/DL — SIGNIFICANT CHANGE UP (ref 32–36)
MCHC RBC-ENTMCNC: 34.5 GM/DL — SIGNIFICANT CHANGE UP (ref 32–36)
MCV RBC AUTO: 85.7 FL — SIGNIFICANT CHANGE UP (ref 80–100)
MCV RBC AUTO: 86.6 FL — SIGNIFICANT CHANGE UP (ref 80–100)
NRBC # BLD: 0 /100 WBCS — SIGNIFICANT CHANGE UP (ref 0–0)
NRBC # BLD: 0 /100 WBCS — SIGNIFICANT CHANGE UP (ref 0–0)
PHOSPHATE SERPL-MCNC: 2.5 MG/DL — SIGNIFICANT CHANGE UP (ref 2.5–4.5)
PHOSPHATE SERPL-MCNC: 3.5 MG/DL — SIGNIFICANT CHANGE UP (ref 2.5–4.5)
PLATELET # BLD AUTO: 257 K/UL — SIGNIFICANT CHANGE UP (ref 150–400)
PLATELET # BLD AUTO: 329 K/UL — SIGNIFICANT CHANGE UP (ref 150–400)
POTASSIUM SERPL-MCNC: 3.8 MMOL/L — SIGNIFICANT CHANGE UP (ref 3.5–5.3)
POTASSIUM SERPL-MCNC: 4 MMOL/L — SIGNIFICANT CHANGE UP (ref 3.5–5.3)
POTASSIUM SERPL-SCNC: 3.8 MMOL/L — SIGNIFICANT CHANGE UP (ref 3.5–5.3)
POTASSIUM SERPL-SCNC: 4 MMOL/L — SIGNIFICANT CHANGE UP (ref 3.5–5.3)
PROTHROM AB SERPL-ACNC: 14.4 SEC — HIGH (ref 10–12.9)
PROTHROM AB SERPL-ACNC: 15.4 SEC — HIGH (ref 10–12.9)
RBC # BLD: 3.73 M/UL — LOW (ref 4.2–5.8)
RBC # BLD: 4.19 M/UL — LOW (ref 4.2–5.8)
RBC # FLD: 12.8 % — SIGNIFICANT CHANGE UP (ref 10.3–14.5)
RBC # FLD: 13.2 % — SIGNIFICANT CHANGE UP (ref 10.3–14.5)
SODIUM SERPL-SCNC: 131 MMOL/L — LOW (ref 135–145)
SODIUM SERPL-SCNC: 133 MMOL/L — LOW (ref 135–145)
WBC # BLD: 17.78 K/UL — HIGH (ref 3.8–10.5)
WBC # BLD: 22.05 K/UL — HIGH (ref 3.8–10.5)
WBC # FLD AUTO: 17.78 K/UL — HIGH (ref 3.8–10.5)
WBC # FLD AUTO: 22.05 K/UL — HIGH (ref 3.8–10.5)

## 2019-11-07 PROCEDURE — 99233 SBSQ HOSP IP/OBS HIGH 50: CPT

## 2019-11-07 PROCEDURE — 99292 CRITICAL CARE ADDL 30 MIN: CPT

## 2019-11-07 PROCEDURE — 99291 CRITICAL CARE FIRST HOUR: CPT

## 2019-11-07 PROCEDURE — 93306 TTE W/DOPPLER COMPLETE: CPT | Mod: 26

## 2019-11-07 PROCEDURE — 71045 X-RAY EXAM CHEST 1 VIEW: CPT | Mod: 26,76

## 2019-11-07 RX ORDER — FUROSEMIDE 40 MG
20 TABLET ORAL ONCE
Refills: 0 | Status: COMPLETED | OUTPATIENT
Start: 2019-11-07 | End: 2019-11-07

## 2019-11-07 RX ORDER — KETOROLAC TROMETHAMINE 30 MG/ML
30 SYRINGE (ML) INJECTION EVERY 6 HOURS
Refills: 0 | Status: DISCONTINUED | OUTPATIENT
Start: 2019-11-07 | End: 2019-11-08

## 2019-11-07 RX ORDER — SODIUM CHLORIDE 9 MG/ML
1000 INJECTION, SOLUTION INTRAVENOUS ONCE
Refills: 0 | Status: COMPLETED | OUTPATIENT
Start: 2019-11-07 | End: 2019-11-07

## 2019-11-07 RX ORDER — ALBUMIN HUMAN 25 %
250 VIAL (ML) INTRAVENOUS ONCE
Refills: 0 | Status: COMPLETED | OUTPATIENT
Start: 2019-11-07 | End: 2019-11-07

## 2019-11-07 RX ORDER — POTASSIUM CHLORIDE 20 MEQ
40 PACKET (EA) ORAL ONCE
Refills: 0 | Status: COMPLETED | OUTPATIENT
Start: 2019-11-07 | End: 2019-11-07

## 2019-11-07 RX ORDER — FENTANYL CITRATE 50 UG/ML
25 INJECTION INTRAVENOUS ONCE
Refills: 0 | Status: DISCONTINUED | OUTPATIENT
Start: 2019-11-07 | End: 2019-11-07

## 2019-11-07 RX ORDER — ACETAMINOPHEN 500 MG
650 TABLET ORAL EVERY 6 HOURS
Refills: 0 | Status: DISCONTINUED | OUTPATIENT
Start: 2019-11-07 | End: 2019-11-12

## 2019-11-07 RX ORDER — METOPROLOL TARTRATE 50 MG
12.5 TABLET ORAL EVERY 6 HOURS
Refills: 0 | Status: DISCONTINUED | OUTPATIENT
Start: 2019-11-07 | End: 2019-11-08

## 2019-11-07 RX ORDER — FUROSEMIDE 40 MG
10 TABLET ORAL ONCE
Refills: 0 | Status: COMPLETED | OUTPATIENT
Start: 2019-11-07 | End: 2019-11-07

## 2019-11-07 RX ORDER — SODIUM CHLORIDE 9 MG/ML
500 INJECTION, SOLUTION INTRAVENOUS ONCE
Refills: 0 | Status: COMPLETED | OUTPATIENT
Start: 2019-11-07 | End: 2019-11-07

## 2019-11-07 RX ORDER — OXYCODONE AND ACETAMINOPHEN 5; 325 MG/1; MG/1
1 TABLET ORAL EVERY 6 HOURS
Refills: 0 | Status: DISCONTINUED | OUTPATIENT
Start: 2019-11-07 | End: 2019-11-08

## 2019-11-07 RX ADMIN — Medication 125 MILLILITER(S): at 03:00

## 2019-11-07 RX ADMIN — FENTANYL CITRATE 25 MICROGRAM(S): 50 INJECTION INTRAVENOUS at 03:00

## 2019-11-07 RX ADMIN — Medication 400 MILLIGRAM(S): at 00:03

## 2019-11-07 RX ADMIN — HEPARIN SODIUM 5000 UNIT(S): 5000 INJECTION INTRAVENOUS; SUBCUTANEOUS at 06:57

## 2019-11-07 RX ADMIN — SODIUM CHLORIDE 1000 MILLILITER(S): 9 INJECTION, SOLUTION INTRAVENOUS at 07:00

## 2019-11-07 RX ADMIN — OXYCODONE AND ACETAMINOPHEN 1 TABLET(S): 5; 325 TABLET ORAL at 20:43

## 2019-11-07 RX ADMIN — Medication 30 MILLIGRAM(S): at 15:15

## 2019-11-07 RX ADMIN — CLOPIDOGREL BISULFATE 75 MILLIGRAM(S): 75 TABLET, FILM COATED ORAL at 12:58

## 2019-11-07 RX ADMIN — HEPARIN SODIUM 5000 UNIT(S): 5000 INJECTION INTRAVENOUS; SUBCUTANEOUS at 21:42

## 2019-11-07 RX ADMIN — Medication 125 MILLILITER(S): at 03:30

## 2019-11-07 RX ADMIN — Medication 125 MILLILITER(S): at 10:00

## 2019-11-07 RX ADMIN — Medication 20 MILLIGRAM(S): at 06:57

## 2019-11-07 RX ADMIN — HEPARIN SODIUM 5000 UNIT(S): 5000 INJECTION INTRAVENOUS; SUBCUTANEOUS at 13:02

## 2019-11-07 RX ADMIN — CHLORHEXIDINE GLUCONATE 5 MILLILITER(S): 213 SOLUTION TOPICAL at 13:00

## 2019-11-07 RX ADMIN — ATORVASTATIN CALCIUM 40 MILLIGRAM(S): 80 TABLET, FILM COATED ORAL at 21:41

## 2019-11-07 RX ADMIN — PANTOPRAZOLE SODIUM 40 MILLIGRAM(S): 20 TABLET, DELAYED RELEASE ORAL at 12:58

## 2019-11-07 RX ADMIN — Medication 2000 MILLIGRAM(S): at 21:41

## 2019-11-07 RX ADMIN — Medication 1000 MILLIGRAM(S): at 01:03

## 2019-11-07 RX ADMIN — Medication 12.5 MILLIGRAM(S): at 16:02

## 2019-11-07 RX ADMIN — CHLORHEXIDINE GLUCONATE 15 MILLILITER(S): 213 SOLUTION TOPICAL at 19:36

## 2019-11-07 RX ADMIN — SODIUM CHLORIDE 1000 MILLILITER(S): 9 INJECTION, SOLUTION INTRAVENOUS at 07:49

## 2019-11-07 RX ADMIN — Medication 30 MILLIGRAM(S): at 15:00

## 2019-11-07 RX ADMIN — Medication 125 MILLILITER(S): at 17:06

## 2019-11-07 RX ADMIN — CHLORHEXIDINE GLUCONATE 5 MILLILITER(S): 213 SOLUTION TOPICAL at 02:43

## 2019-11-07 RX ADMIN — Medication 125 MILLILITER(S): at 16:00

## 2019-11-07 RX ADMIN — Medication 2000 MILLIGRAM(S): at 13:03

## 2019-11-07 RX ADMIN — Medication 81 MILLIGRAM(S): at 12:58

## 2019-11-07 RX ADMIN — FENTANYL CITRATE 25 MICROGRAM(S): 50 INJECTION INTRAVENOUS at 03:30

## 2019-11-07 RX ADMIN — Medication 40 MILLIEQUIVALENT(S): at 19:36

## 2019-11-07 RX ADMIN — Medication 2000 MILLIGRAM(S): at 06:58

## 2019-11-07 RX ADMIN — CHLORHEXIDINE GLUCONATE 5 MILLILITER(S): 213 SOLUTION TOPICAL at 21:42

## 2019-11-07 RX ADMIN — Medication 12.5 MILLIGRAM(S): at 21:42

## 2019-11-07 RX ADMIN — OXYCODONE AND ACETAMINOPHEN 1 TABLET(S): 5; 325 TABLET ORAL at 21:10

## 2019-11-07 RX ADMIN — Medication 10 MILLIGRAM(S): at 17:07

## 2019-11-07 RX ADMIN — SODIUM CHLORIDE 1000 MILLILITER(S): 9 INJECTION, SOLUTION INTRAVENOUS at 08:00

## 2019-11-07 RX ADMIN — CHLORHEXIDINE GLUCONATE 15 MILLILITER(S): 213 SOLUTION TOPICAL at 06:58

## 2019-11-07 RX ADMIN — Medication 125 MILLILITER(S): at 11:00

## 2019-11-07 NOTE — PHYSICAL THERAPY INITIAL EVALUATION ADULT - PERTINENT HX OF CURRENT PROBLEM, REHAB EVAL
60 yo male with postive family history, presents with PMHx of psoriasis, Hypertension, Hyperlipidemia, obesity, known CAD s/p PTCA/RADHA LAD [place]@ [facility] 8/24/2010 and known hx of interatrial septal aneurysm.

## 2019-11-07 NOTE — PROGRESS NOTE ADULT - SUBJECTIVE AND OBJECTIVE BOX
CTICU  CRITICAL  CARE  attending     Hand off received 					   Pertinent clinical, laboratory, radiographic, hemodynamic, echocardiographic, respiratory data, microbiologic data and chart were reviewed and analyzed frequently throughout the course of the day and night    Patient seen and examined with CTS/ SH attending at bedside    58 yo male with psoriasis, Hypertension, Hyperlipidemia, obesity, known CAD s/p PTCA/RADHA LAD andD@ [facility] 8/24/2010 and known hx of interatrial septal aneurysm.   He presented to cardiologist, Dr Cayden Lemus, endorsing dyspnea on exertion with 4 flights of stairs and chronic b/l knee pain. CCS II. Denies chest pain, dizziness, palpitations, nausea, PND/orthopnea, LE edema. Echocardiogram 5/17/19 showed EF 54%. Mild TR, large LA, thick aortic valve, interatrial septal aneurysm and anteroseptal wall hypokinesis. CTA coronaries 10/17/19 LM less than 50% stenosis 2/2 calcified plaque. Severe stenosis in mid LAD, proximal LCX. Degree of stenosis cannot be assessed in mLCX and OM2 2/2 motion artifact.   10/31/19   cardiac cath that revealed 3 vessel CAD.   Dr. Torres was consulted for evaluation for CABG.  The patient presents for elective surgery.      FAMILY HISTORY:  FH: heart disease  PAST MEDICAL & SURGICAL HISTORY:  Presence of stent in LAD coronary artery  CAD in native artery  Psoriasis  Obesity  HLD (hyperlipidemia)  HTN (hypertension)  H/O arthroscopy of knee  S/P cholecystectomy         14 system review was unremarkable    Vital signs, hemodynamic and respiratory parameters were reviewed from the bedside nursing flow sheet.  ICU Vital Signs Last 24 Hrs  T(C): 36.1 (07 Nov 2019 18:32), Max: 36.7 (07 Nov 2019 14:39)  T(F): 96.9 (07 Nov 2019 18:32), Max: 98 (07 Nov 2019 14:39)  HR: 96 (07 Nov 2019 21:00) (76 - 98)  BP: 134/69 (07 Nov 2019 21:00) (95/62 - 137/73)  BP(mean): 87 (07 Nov 2019 21:00) (71 - 96)  ABP: 94/58 (07 Nov 2019 06:00) (92/52 - 110/64)  ABP(mean): 72 (07 Nov 2019 06:00) (66 - 82)  RR: 18 (07 Nov 2019 21:00) (17 - 20)  SpO2: 92% (07 Nov 2019 21:00) (89% - 97%)    Adult Advanced Hemodynamics Last 24 Hrs  CVP(mm Hg): 13 (07 Nov 2019 21:00) (0 - 24)  CVP(cm H2O): --  CO: --  CI: --  PA: --  PA(mean): --  PCWP: --  SVR: --  SVRI: --  PVR: --  PVRI: --, ABG - ( 07 Nov 2019 02:35 )  pH, Arterial: 7.43  pH, Blood: x     /  pCO2: 36    /  pO2: 97    / HCO3: 23    / Base Excess: -0.6  /  SaO2: 97                  Intake and output was reviewed and the fluid balance was calculated  Daily     Daily   I&O's Summary    06 Nov 2019 07:01  -  07 Nov 2019 07:00  --------------------------------------------------------  IN: 1925 mL / OUT: 1035 mL / NET: 890 mL    07 Nov 2019 07:01  -  07 Nov 2019 21:34  --------------------------------------------------------  IN: 3240 mL / OUT: 1645 mL / NET: 1595 mL        All lines and drain sites were assessed    Neuro: No change in the mental status from the baseline. Moves all 4 extremities.  Neck: No JVD.  CVS: S1, S1, No S3.  Lungs: Good air entry bilaterally.  Abd: Soft. No tenderness. + Bowel sounds.  Vascular: + DP/PT.  Extremities: No edema.  Lymphatic: Normal.  Skin: No abnormalities.      labs  CBC Full  -  ( 07 Nov 2019 12:22 )  WBC Count : 17.78 K/uL  RBC Count : 3.73 M/uL  Hemoglobin : 11.1 g/dL  Hematocrit : 32.3 %  Platelet Count - Automated : 257 K/uL  Mean Cell Volume : 86.6 fl  Mean Cell Hemoglobin : 29.8 pg  Mean Cell Hemoglobin Concentration : 34.4 gm/dL  Auto Neutrophil # : x  Auto Lymphocyte # : x  Auto Monocyte # : x  Auto Eosinophil # : x  Auto Basophil # : x  Auto Neutrophil % : x  Auto Lymphocyte % : x  Auto Monocyte % : x  Auto Eosinophil % : x  Auto Basophil % : x    11-07    133<L>  |  97  |  17  ----------------------------<  120<H>  3.8   |  28  |  0.81    Ca    8.3<L>      07 Nov 2019 12:22  Phos  2.5     11-07  Mg     2.2     11-07    TPro  6.5  /  Alb  3.5  /  TBili  1.0  /  DBili  x   /  AST  19  /  ALT  23  /  AlkPhos  92  11-06    PT/INR - ( 07 Nov 2019 12:22 )   PT: 15.4 sec;   INR: 1.35          PTT - ( 07 Nov 2019 12:22 )  PTT:31.1 sec  The current medications were reviewed   MEDICATIONS  (STANDING):  aspirin enteric coated 81 milliGRAM(s) Oral daily  atorvastatin 40 milliGRAM(s) Oral at bedtime  ceFAZolin  Injectable. 2000 milliGRAM(s) IV Push every 8 hours  chlorhexidine 0.12% Liquid 5 milliLiter(s) Oral Mucosa every 4 hours  chlorhexidine 0.12% Liquid 15 milliLiter(s) Oral Mucosa every 12 hours  clopidogrel Tablet 75 milliGRAM(s) Oral daily  dextrose 50% Injectable 50 milliLiter(s) IV Push every 15 minutes  dextrose 50% Injectable 25 milliLiter(s) IV Push every 15 minutes  heparin  Injectable 5000 Unit(s) SubCutaneous every 8 hours  insulin regular Infusion 1 Unit(s)/Hr (1 mL/Hr) IV Continuous <Continuous>  meperidine     Injectable 25 milliGRAM(s) IV Push once  metoprolol tartrate 12.5 milliGRAM(s) Oral every 6 hours  pantoprazole  Injectable 40 milliGRAM(s) IV Push daily  sodium chloride 0.9%. 1000 milliLiter(s) (10 mL/Hr) IV Continuous <Continuous>    MEDICATIONS  (PRN):  acetaminophen   Tablet .. 650 milliGRAM(s) Oral every 6 hours PRN Mild Pain (1 - 3)  ketorolac   Injectable 30 milliGRAM(s) IV Push every 6 hours PRN Moderate Pain (4 - 6)  oxycodone    5 mG/acetaminophen 325 mG 1 Tablet(s) Oral every 6 hours PRN Severe Pain (7 - 10)        Patient is a 59y old  Male who presents with CAD.  S/P CABG  Hemodynamically stable.  Good oxygenation.  Fair urine out put.  Overall doing well.  Hemodynamically stable.  Good oxygenation.  Fair urine out put.  Overall doing well.      My plan includes :  Statin and Betablocker.  Dual antiplatelet Rx.  Close hemodynamic, ventilatory and drain monitoring and management  Monitor for arrhythmias and monitor parameters for organ perfusion  Monitor neurologic status  Monitor renal function.  Head of the bed should remain elevated to 45 deg .   Chest PT and IS will be encouraged  Monitor adequacy of oxygenation and ventilation and attempt to wean oxygen  Nutritional goals will be met using po eventually , ensure adequate caloric intake and monitor the same  Stress ulcer and VTE prophylaxis will be achieved    Glycemic control is satisfactory  Electrolytes have been repleted as necessary and wound care has been carried out. Pain control has been achieved.   Aggressive physical therapy and early mobility and ambulation goals will be met   The family was updated about the course and plan  CRITICAL CARE TIME SPENT in evaluation and management, reassessments, review and interpretation of labs and x-rays, ventilator and hemodynamic management, formulating a plan and coordinating care: ___30____ MIN.  Time does not include procedural time.  CTICU ATTENDING     					    Eagle Castellano MD CTICU  CRITICAL  CARE  attending     Hand off received 					   Pertinent clinical, laboratory, radiographic, hemodynamic, echocardiographic, respiratory data, microbiologic data and chart were reviewed and analyzed frequently throughout the course of the day and night    Patient seen and examined with CTS/ SH attending at bedside    58 yo male with psoriasis, Hypertension, Hyperlipidemia, obesity, known CAD s/p PTCA/RADHA LAD andD@ [facility] 8/24/2010 and known hx of interatrial septal aneurysm.   He presented to cardiologist, Dr Cayden Lemus, endorsing dyspnea on exertion with 4 flights of stairs and chronic b/l knee pain. CCS II. Denies chest pain, dizziness, palpitations, nausea, PND/orthopnea, LE edema. Echocardiogram 5/17/19 showed EF 54%. Mild TR, large LA, thick aortic valve, interatrial septal aneurysm and anteroseptal wall hypokinesis. CTA coronaries 10/17/19 LM less than 50% stenosis 2/2 calcified plaque. Severe stenosis in mid LAD, proximal LCX. Degree of stenosis cannot be assessed in mLCX and OM2 2/2 motion artifact.   10/31/19   cardiac cath that revealed 3 vessel CAD.   Dr. Torres was consulted for evaluation for CABG.  The patient presents for elective surgery.      FAMILY HISTORY:  FH: heart disease  PAST MEDICAL & SURGICAL HISTORY:  Presence of stent in LAD coronary artery  CAD in native artery  Psoriasis  Obesity  HLD (hyperlipidemia)  HTN (hypertension)  H/O arthroscopy of knee  S/P cholecystectomy         14 system review was unremarkable    Vital signs, hemodynamic and respiratory parameters were reviewed from the bedside nursing flow sheet.  ICU Vital Signs Last 24 Hrs  T(C): 36.1 (07 Nov 2019 18:32), Max: 36.7 (07 Nov 2019 14:39)  T(F): 96.9 (07 Nov 2019 18:32), Max: 98 (07 Nov 2019 14:39)  HR: 96 (07 Nov 2019 21:00) (76 - 98)  BP: 134/69 (07 Nov 2019 21:00) (95/62 - 137/73)  BP(mean): 87 (07 Nov 2019 21:00) (71 - 96)  ABP: 94/58 (07 Nov 2019 06:00) (92/52 - 110/64)  ABP(mean): 72 (07 Nov 2019 06:00) (66 - 82)  RR: 18 (07 Nov 2019 21:00) (17 - 20)  SpO2: 92% (07 Nov 2019 21:00) (89% - 97%)    Adult Advanced Hemodynamics Last 24 Hrs  CVP(mm Hg): 13 (07 Nov 2019 21:00) (0 - 24)  CVP(cm H2O): --  CO: --  CI: --  PA: --  PA(mean): --  PCWP: --  SVR: --  SVRI: --  PVR: --  PVRI: --, ABG - ( 07 Nov 2019 02:35 )  pH, Arterial: 7.43  pH, Blood: x     /  pCO2: 36    /  pO2: 97    / HCO3: 23    / Base Excess: -0.6  /  SaO2: 97                  Intake and output was reviewed and the fluid balance was calculated  Daily     Daily   I&O's Summary    06 Nov 2019 07:01  -  07 Nov 2019 07:00  --------------------------------------------------------  IN: 1925 mL / OUT: 1035 mL / NET: 890 mL    07 Nov 2019 07:01  -  07 Nov 2019 21:34  --------------------------------------------------------  IN: 3240 mL / OUT: 1645 mL / NET: 1595 mL        All lines and drain sites were assessed    Neuro: No change in the mental status from the baseline. Moves all 4 extremities.  Neck: No JVD.  CVS: S1, S1, No S3.  Lungs: Good air entry bilaterally.  Abd: Soft. No tenderness. + Bowel sounds.  Vascular: + DP/PT.  Extremities: No edema.  Lymphatic: Normal.  Skin: No abnormalities.      labs  CBC Full  -  ( 07 Nov 2019 12:22 )  WBC Count : 17.78 K/uL  RBC Count : 3.73 M/uL  Hemoglobin : 11.1 g/dL  Hematocrit : 32.3 %  Platelet Count - Automated : 257 K/uL  Mean Cell Volume : 86.6 fl  Mean Cell Hemoglobin : 29.8 pg  Mean Cell Hemoglobin Concentration : 34.4 gm/dL  Auto Neutrophil # : x  Auto Lymphocyte # : x  Auto Monocyte # : x  Auto Eosinophil # : x  Auto Basophil # : x  Auto Neutrophil % : x  Auto Lymphocyte % : x  Auto Monocyte % : x  Auto Eosinophil % : x  Auto Basophil % : x    11-07    133<L>  |  97  |  17  ----------------------------<  120<H>  3.8   |  28  |  0.81    Ca    8.3<L>      07 Nov 2019 12:22  Phos  2.5     11-07  Mg     2.2     11-07    TPro  6.5  /  Alb  3.5  /  TBili  1.0  /  DBili  x   /  AST  19  /  ALT  23  /  AlkPhos  92  11-06    PT/INR - ( 07 Nov 2019 12:22 )   PT: 15.4 sec;   INR: 1.35          PTT - ( 07 Nov 2019 12:22 )  PTT:31.1 sec  The current medications were reviewed   MEDICATIONS  (STANDING):  aspirin enteric coated 81 milliGRAM(s) Oral daily  atorvastatin 40 milliGRAM(s) Oral at bedtime  ceFAZolin  Injectable. 2000 milliGRAM(s) IV Push every 8 hours  chlorhexidine 0.12% Liquid 5 milliLiter(s) Oral Mucosa every 4 hours  chlorhexidine 0.12% Liquid 15 milliLiter(s) Oral Mucosa every 12 hours  clopidogrel Tablet 75 milliGRAM(s) Oral daily  dextrose 50% Injectable 50 milliLiter(s) IV Push every 15 minutes  dextrose 50% Injectable 25 milliLiter(s) IV Push every 15 minutes  heparin  Injectable 5000 Unit(s) SubCutaneous every 8 hours  insulin regular Infusion 1 Unit(s)/Hr (1 mL/Hr) IV Continuous <Continuous>  meperidine     Injectable 25 milliGRAM(s) IV Push once  metoprolol tartrate 12.5 milliGRAM(s) Oral every 6 hours  pantoprazole  Injectable 40 milliGRAM(s) IV Push daily  sodium chloride 0.9%. 1000 milliLiter(s) (10 mL/Hr) IV Continuous <Continuous>    MEDICATIONS  (PRN):  acetaminophen   Tablet .. 650 milliGRAM(s) Oral every 6 hours PRN Mild Pain (1 - 3)  ketorolac   Injectable 30 milliGRAM(s) IV Push every 6 hours PRN Moderate Pain (4 - 6)  oxycodone    5 mG/acetaminophen 325 mG 1 Tablet(s) Oral every 6 hours PRN Severe Pain (7 - 10)        Patient is a 59y old  Male who presents with CAD.  S/P CABG  Hemodynamically stable.  Good oxygenation.  Fair urine out put.  Overall doing well.  Hemodynamically stable.  Good oxygenation.  Fair urine out put.  Overall doing well.      My plan includes :  Statin and Betablocker.  Dual antiplatelet Rx.  Close hemodynamic, ventilatory and drain monitoring and management  Monitor for arrhythmias and monitor parameters for organ perfusion  Monitor neurologic status  Monitor renal function.  Head of the bed should remain elevated to 45 deg .   Chest PT and IS will be encouraged  Monitor adequacy of oxygenation and ventilation and attempt to wean oxygen  Nutritional goals will be met using po eventually , ensure adequate caloric intake and monitor the same  Stress ulcer and VTE prophylaxis will be achieved    Glycemic control is satisfactory  Electrolytes have been repleted as necessary and wound care has been carried out. Pain control has been achieved.   Aggressive physical therapy and early mobility and ambulation goals will be met   The family was updated about the course and plan  CRITICAL CARE TIME SPENT in evaluation and management, reassessments, review and interpretation of labs and x-rays, ventilator and hemodynamic management, formulating a plan and coordinating care: ___60____ MIN.  Time does not include procedural time.  CTICU ATTENDING     					    Eagle Castellano MD

## 2019-11-07 NOTE — PROGRESS NOTE ADULT - SUBJECTIVE AND OBJECTIVE BOX
CTICU  CRITICAL  CARE  attending     Hand off received 					   Pertinent clinical, laboratory, radiographic, hemodynamic, echocardiographic, respiratory data, microbiologic data and chart were reviewed and analyzed frequently throughout the course of the day and night  Patient seen and examined with CTS/ SH attending at bedside  Pt is a 59y , Male, HEALTH ISSUES - PROBLEM Dx:      , FAMILY HISTORY:  FH: heart disease  PAST MEDICAL & SURGICAL HISTORY:  Presence of stent in LAD coronary artery  CAD in native artery  Psoriasis  Obesity  HLD (hyperlipidemia)  HTN (hypertension)  H/O arthroscopy of knee  S/P cholecystectomy    Patient is a 59y old  Male who presents with a chief complaint of CAD (07 Nov 2019 21:34)      14 system review was unremarkable    Vital signs, hemodynamic and respiratory parameters were reviewed from the bedside nursing flowsheet.  ICU Vital Signs Last 24 Hrs  T(C): 36.2 (09 Nov 2019 09:16), Max: 37.3 (08 Nov 2019 18:32)  T(F): 97.1 (09 Nov 2019 09:16), Max: 99.1 (08 Nov 2019 18:32)  HR: 106 (09 Nov 2019 13:04) (86 - 108)  BP: 171/109 (09 Nov 2019 13:04) (126/79 - 174/102)  BP(mean): 129 (09 Nov 2019 13:04) (95 - 129)  ABP: --  ABP(mean): --  RR: 12 (09 Nov 2019 13:04) (12 - 22)  SpO2: 96% (09 Nov 2019 13:04) (92% - 98%)    Adult Advanced Hemodynamics Last 24 Hrs  CVP(mm Hg): --  CVP(cm H2O): --  CO: --  CI: --  PA: --  PA(mean): --  PCWP: --  SVR: --  SVRI: --  PVR: --  PVRI: --, ABG - ( 08 Nov 2019 04:49 )  pH, Arterial: 7.45  pH, Blood: x     /  pCO2: 39    /  pO2: 37    / HCO3: 27    / Base Excess: 2.5   /  SaO2: 69                  Intake and output was reviewed and the fluid balance was calculated  Daily     Daily   I&O's Summary    08 Nov 2019 07:01  -  09 Nov 2019 07:00  --------------------------------------------------------  IN: 40 mL / OUT: 910 mL / NET: -870 mL    09 Nov 2019 07:01  -  09 Nov 2019 13:07  --------------------------------------------------------  IN: 0 mL / OUT: 120 mL / NET: -120 mL        All lines and drain sites were assessed  Glycemic trend was reviewedCAPILLARY BLOOD GLUCOSE        No acute change in mental status  Auscultation of the chest reveals equal bs  Abdomen is soft  Extremities are warm and well perfused  Wounds appear clean and unremarkable  Antibiotics are periop    labs  CBC Full  -  ( 09 Nov 2019 06:02 )  WBC Count : 14.41 K/uL  RBC Count : 3.74 M/uL  Hemoglobin : 11.1 g/dL  Hematocrit : 32.5 %  Platelet Count - Automated : 231 K/uL  Mean Cell Volume : 86.9 fl  Mean Cell Hemoglobin : 29.7 pg  Mean Cell Hemoglobin Concentration : 34.2 gm/dL  Auto Neutrophil # : 10.46 K/uL  Auto Lymphocyte # : 1.73 K/uL  Auto Monocyte # : 1.56 K/uL  Auto Eosinophil # : 0.30 K/uL  Auto Basophil # : 0.08 K/uL  Auto Neutrophil % : 72.6 %  Auto Lymphocyte % : 12.0 %  Auto Monocyte % : 10.8 %  Auto Eosinophil % : 2.1 %  Auto Basophil % : 0.6 %    11-09    132<L>  |  96  |  14  ----------------------------<  103<H>  3.8   |  23  |  0.68    Ca    8.9      09 Nov 2019 06:02  Mg     2.1     11-09        The current medications were reviewed   MEDICATIONS  (STANDING):  aspirin enteric coated 81 milliGRAM(s) Oral daily  atorvastatin 40 milliGRAM(s) Oral at bedtime  clopidogrel Tablet 75 milliGRAM(s) Oral daily  famotidine    Tablet 20 milliGRAM(s) Oral two times a day  heparin  Injectable 5000 Unit(s) SubCutaneous every 8 hours  lidocaine   Patch 1 Patch Transdermal every 24 hours  metoprolol tartrate 37.5 milliGRAM(s) Oral <User Schedule>  senna 2 Tablet(s) Oral at bedtime  sodium chloride 0.9% lock flush 3 milliLiter(s) IV Push every 8 hours    MEDICATIONS  (PRN):  acetaminophen   Tablet .. 650 milliGRAM(s) Oral every 6 hours PRN Mild Pain (1 - 3)  oxycodone    5 mG/acetaminophen 325 mG 1 Tablet(s) Oral every 4 hours PRN Severe Pain (7 - 10)  polyethylene glycol 3350 17 Gram(s) Oral daily PRN Constipation       PROBLEM LIST/ ASSESSMENT:  HEALTH ISSUES - PROBLEM Dx:      ,   Patient is a 59y old  Male who presents with a chief complaint of CAD (07 Nov 2019 21:34)     s/p cardiac surgery              My plan includes :  close hemodynamic, ventilatory and drain monitoring and management per post op routine    Monitor for arrhythmias and monitor parameters for organ perfusion  beta blockade not administered due to hemodynamic instability and bradycardia  monitor neurologic status  Head of the bed should remain elevated to 45 deg .   chest PT and IS will be encouraged  monitor adequacy of oxygenation and ventilation and attempt to wean oxygen  antibiotic regimen will be tailored to the clinical, laboratory and microbiologic data  Nutritional goals will be met using po eventually , ensure adequate caloric intake and montior the same  Stress ulcer and VTE prophylaxis will be achieved    Glycemic control is satisfactory  Electrolytes have been repleted as necessary and wound care has been carried out. Pain control has been achieved.   agressive physical therapy and early mobility and ambulation goals will be met   The family was updated about the course and plan  CRITICAL CARE TIME personally provided by me  in evaluation and management, reassessments, review and interpretation of labs and x-rays, ventilator and hemodynamic management, formulating a plan and coordinating care: ___90____ MIN.  Time does not include procedural time.  CTICU ATTENDING     					    Jeremy Baum MD

## 2019-11-07 NOTE — CHART NOTE - NSCHARTNOTEFT_GEN_A_CORE
Rt pleural CT removed as per Dr. Torres.  Mediastinal CT and left pleural kept in place, on waterseal for now.      After post tube CXR, will clamp remaining tubes.  If repeat CXR in a few hours looks stable will remove remaining tubes.

## 2019-11-07 NOTE — CHART NOTE - NSCHARTNOTEFT_GEN_A_CORE
CXR stable post CT removal.  No pneumothorax or new collection.    Remaining chest tubes placed on clamp trial.

## 2019-11-07 NOTE — PHYSICAL THERAPY INITIAL EVALUATION ADULT - ADDITIONAL COMMENTS
Patient lives in house, 7 steps to enter. Patient denies home health assistance, DME, or history of falls.

## 2019-11-07 NOTE — CHART NOTE - NSCHARTNOTEFT_GEN_A_CORE
Patient passed clamp trial; However on further examination pleural CT had a 1/5 air leak.  Both tubes kept in place.  Discussed w/ Dr. Baum.

## 2019-11-08 PROBLEM — Z09 POSTOP CHECK: Status: ACTIVE | Noted: 2019-11-08

## 2019-11-08 LAB
ANION GAP SERPL CALC-SCNC: 10 MMOL/L — SIGNIFICANT CHANGE UP (ref 5–17)
BASE EXCESS BLDA CALC-SCNC: 2.5 MMOL/L — SIGNIFICANT CHANGE UP (ref -2–3)
BUN SERPL-MCNC: 15 MG/DL — SIGNIFICANT CHANGE UP (ref 7–23)
CALCIUM SERPL-MCNC: 8.6 MG/DL — SIGNIFICANT CHANGE UP (ref 8.4–10.5)
CHLORIDE SERPL-SCNC: 97 MMOL/L — SIGNIFICANT CHANGE UP (ref 96–108)
CO2 SERPL-SCNC: 25 MMOL/L — SIGNIFICANT CHANGE UP (ref 22–31)
CREAT SERPL-MCNC: 0.74 MG/DL — SIGNIFICANT CHANGE UP (ref 0.5–1.3)
GLUCOSE SERPL-MCNC: 102 MG/DL — HIGH (ref 70–99)
HCO3 BLDA-SCNC: 27 MMOL/L — SIGNIFICANT CHANGE UP (ref 21–28)
HCT VFR BLD CALC: 30.5 % — LOW (ref 39–50)
HGB BLD-MCNC: 10.2 G/DL — LOW (ref 13–17)
MAGNESIUM SERPL-MCNC: 2.1 MG/DL — SIGNIFICANT CHANGE UP (ref 1.6–2.6)
MCHC RBC-ENTMCNC: 29.4 PG — SIGNIFICANT CHANGE UP (ref 27–34)
MCHC RBC-ENTMCNC: 33.4 GM/DL — SIGNIFICANT CHANGE UP (ref 32–36)
MCV RBC AUTO: 87.9 FL — SIGNIFICANT CHANGE UP (ref 80–100)
NRBC # BLD: 0 /100 WBCS — SIGNIFICANT CHANGE UP (ref 0–0)
PCO2 BLDA: 39 MMHG — SIGNIFICANT CHANGE UP (ref 35–48)
PH BLDA: 7.45 — SIGNIFICANT CHANGE UP (ref 7.35–7.45)
PLATELET # BLD AUTO: 209 K/UL — SIGNIFICANT CHANGE UP (ref 150–400)
PO2 BLDA: 37 MMHG — CRITICAL LOW (ref 83–108)
POTASSIUM SERPL-MCNC: 3.9 MMOL/L — SIGNIFICANT CHANGE UP (ref 3.5–5.3)
POTASSIUM SERPL-SCNC: 3.9 MMOL/L — SIGNIFICANT CHANGE UP (ref 3.5–5.3)
RBC # BLD: 3.47 M/UL — LOW (ref 4.2–5.8)
RBC # FLD: 13.2 % — SIGNIFICANT CHANGE UP (ref 10.3–14.5)
SAO2 % BLDA: 69 % — LOW (ref 95–100)
SODIUM SERPL-SCNC: 132 MMOL/L — LOW (ref 135–145)
WBC # BLD: 14.65 K/UL — HIGH (ref 3.8–10.5)
WBC # FLD AUTO: 14.65 K/UL — HIGH (ref 3.8–10.5)

## 2019-11-08 PROCEDURE — 71045 X-RAY EXAM CHEST 1 VIEW: CPT | Mod: 26

## 2019-11-08 PROCEDURE — 99292 CRITICAL CARE ADDL 30 MIN: CPT

## 2019-11-08 PROCEDURE — 99291 CRITICAL CARE FIRST HOUR: CPT

## 2019-11-08 RX ORDER — FAMOTIDINE 10 MG/ML
20 INJECTION INTRAVENOUS
Refills: 0 | Status: DISCONTINUED | OUTPATIENT
Start: 2019-11-08 | End: 2019-11-12

## 2019-11-08 RX ORDER — POTASSIUM CHLORIDE 20 MEQ
20 PACKET (EA) ORAL ONCE
Refills: 0 | Status: COMPLETED | OUTPATIENT
Start: 2019-11-08 | End: 2019-11-08

## 2019-11-08 RX ORDER — OXYCODONE AND ACETAMINOPHEN 5; 325 MG/1; MG/1
1 TABLET ORAL EVERY 4 HOURS
Refills: 0 | Status: DISCONTINUED | OUTPATIENT
Start: 2019-11-08 | End: 2019-11-12

## 2019-11-08 RX ORDER — POLYETHYLENE GLYCOL 3350 17 G/17G
17 POWDER, FOR SOLUTION ORAL DAILY
Refills: 0 | Status: DISCONTINUED | OUTPATIENT
Start: 2019-11-08 | End: 2019-11-12

## 2019-11-08 RX ORDER — SENNA PLUS 8.6 MG/1
2 TABLET ORAL AT BEDTIME
Refills: 0 | Status: DISCONTINUED | OUTPATIENT
Start: 2019-11-08 | End: 2019-11-12

## 2019-11-08 RX ORDER — METOPROLOL TARTRATE 50 MG
25 TABLET ORAL EVERY 8 HOURS
Refills: 0 | Status: DISCONTINUED | OUTPATIENT
Start: 2019-11-08 | End: 2019-11-09

## 2019-11-08 RX ORDER — SODIUM CHLORIDE 9 MG/ML
3 INJECTION INTRAMUSCULAR; INTRAVENOUS; SUBCUTANEOUS EVERY 8 HOURS
Refills: 0 | Status: DISCONTINUED | OUTPATIENT
Start: 2019-11-08 | End: 2019-11-12

## 2019-11-08 RX ADMIN — Medication 2000 MILLIGRAM(S): at 06:45

## 2019-11-08 RX ADMIN — Medication 100 MILLIEQUIVALENT(S): at 06:05

## 2019-11-08 RX ADMIN — SENNA PLUS 2 TABLET(S): 8.6 TABLET ORAL at 22:06

## 2019-11-08 RX ADMIN — CHLORHEXIDINE GLUCONATE 5 MILLILITER(S): 213 SOLUTION TOPICAL at 03:30

## 2019-11-08 RX ADMIN — OXYCODONE AND ACETAMINOPHEN 1 TABLET(S): 5; 325 TABLET ORAL at 14:44

## 2019-11-08 RX ADMIN — OXYCODONE AND ACETAMINOPHEN 1 TABLET(S): 5; 325 TABLET ORAL at 13:44

## 2019-11-08 RX ADMIN — Medication 81 MILLIGRAM(S): at 11:11

## 2019-11-08 RX ADMIN — OXYCODONE AND ACETAMINOPHEN 1 TABLET(S): 5; 325 TABLET ORAL at 09:08

## 2019-11-08 RX ADMIN — HEPARIN SODIUM 5000 UNIT(S): 5000 INJECTION INTRAVENOUS; SUBCUTANEOUS at 13:50

## 2019-11-08 RX ADMIN — Medication 12.5 MILLIGRAM(S): at 06:46

## 2019-11-08 RX ADMIN — CHLORHEXIDINE GLUCONATE 5 MILLILITER(S): 213 SOLUTION TOPICAL at 06:46

## 2019-11-08 RX ADMIN — HEPARIN SODIUM 5000 UNIT(S): 5000 INJECTION INTRAVENOUS; SUBCUTANEOUS at 22:06

## 2019-11-08 RX ADMIN — CLOPIDOGREL BISULFATE 75 MILLIGRAM(S): 75 TABLET, FILM COATED ORAL at 11:11

## 2019-11-08 RX ADMIN — ATORVASTATIN CALCIUM 40 MILLIGRAM(S): 80 TABLET, FILM COATED ORAL at 22:07

## 2019-11-08 RX ADMIN — OXYCODONE AND ACETAMINOPHEN 1 TABLET(S): 5; 325 TABLET ORAL at 22:07

## 2019-11-08 RX ADMIN — Medication 25 MILLIGRAM(S): at 22:07

## 2019-11-08 RX ADMIN — OXYCODONE AND ACETAMINOPHEN 1 TABLET(S): 5; 325 TABLET ORAL at 23:00

## 2019-11-08 RX ADMIN — CHLORHEXIDINE GLUCONATE 15 MILLILITER(S): 213 SOLUTION TOPICAL at 06:46

## 2019-11-08 RX ADMIN — Medication 30 MILLIGRAM(S): at 02:08

## 2019-11-08 RX ADMIN — HEPARIN SODIUM 5000 UNIT(S): 5000 INJECTION INTRAVENOUS; SUBCUTANEOUS at 06:46

## 2019-11-08 RX ADMIN — OXYCODONE AND ACETAMINOPHEN 1 TABLET(S): 5; 325 TABLET ORAL at 09:09

## 2019-11-08 RX ADMIN — SODIUM CHLORIDE 3 MILLILITER(S): 9 INJECTION INTRAMUSCULAR; INTRAVENOUS; SUBCUTANEOUS at 21:54

## 2019-11-08 RX ADMIN — Medication 25 MILLIGRAM(S): at 13:50

## 2019-11-08 RX ADMIN — FAMOTIDINE 20 MILLIGRAM(S): 10 INJECTION INTRAVENOUS at 16:36

## 2019-11-08 NOTE — PROGRESS NOTE ADULT - SUBJECTIVE AND OBJECTIVE BOX
CTICU  CRITICAL  CARE  attending     Hand off received 					   Pertinent clinical, laboratory, radiographic, hemodynamic, echocardiographic, respiratory data, microbiologic data and chart were reviewed and analyzed frequently throughout the course of the day and night  Patient seen and examined with CTS/ SH attending at bedside  Pt is a 59y , Male, HEALTH ISSUES - PROBLEM Dx:      , FAMILY HISTORY:  FH: heart disease  PAST MEDICAL & SURGICAL HISTORY:  Presence of stent in LAD coronary artery  CAD in native artery  Psoriasis  Obesity  HLD (hyperlipidemia)  HTN (hypertension)  H/O arthroscopy of knee  S/P cholecystectomy    Patient is a 59y old  Male who presents with a chief complaint of CAD (07 Nov 2019 21:34)      14 system review was unremarkable    Vital signs, hemodynamic and respiratory parameters were reviewed from the bedside nursing flowsheet.  ICU Vital Signs Last 24 Hrs  T(C): 36.2 (09 Nov 2019 09:16), Max: 37.3 (08 Nov 2019 18:32)  T(F): 97.1 (09 Nov 2019 09:16), Max: 99.1 (08 Nov 2019 18:32)  HR: 106 (09 Nov 2019 13:04) (86 - 108)  BP: 171/109 (09 Nov 2019 13:04) (126/79 - 174/102)  BP(mean): 129 (09 Nov 2019 13:04) (95 - 129)  ABP: --  ABP(mean): --  RR: 12 (09 Nov 2019 13:04) (12 - 22)  SpO2: 96% (09 Nov 2019 13:04) (92% - 98%)    Adult Advanced Hemodynamics Last 24 Hrs  CVP(mm Hg): --  CVP(cm H2O): --  CO: --  CI: --  PA: --  PA(mean): --  PCWP: --  SVR: --  SVRI: --  PVR: --  PVRI: --, ABG - ( 08 Nov 2019 04:49 )  pH, Arterial: 7.45  pH, Blood: x     /  pCO2: 39    /  pO2: 37    / HCO3: 27    / Base Excess: 2.5   /  SaO2: 69                  Intake and output was reviewed and the fluid balance was calculated  Daily     Daily   I&O's Summary    08 Nov 2019 07:01  -  09 Nov 2019 07:00  --------------------------------------------------------  IN: 40 mL / OUT: 910 mL / NET: -870 mL    09 Nov 2019 07:01  -  09 Nov 2019 13:08  --------------------------------------------------------  IN: 0 mL / OUT: 120 mL / NET: -120 mL        All lines and drain sites were assessed  Glycemic trend was reviewedCAPILLARY BLOOD GLUCOSE        No acute change in mental status  Auscultation of the chest reveals equal bs  Abdomen is soft  Extremities are warm and well perfused  Wounds appear clean and unremarkable  Antibiotics are periop    labs  CBC Full  -  ( 09 Nov 2019 06:02 )  WBC Count : 14.41 K/uL  RBC Count : 3.74 M/uL  Hemoglobin : 11.1 g/dL  Hematocrit : 32.5 %  Platelet Count - Automated : 231 K/uL  Mean Cell Volume : 86.9 fl  Mean Cell Hemoglobin : 29.7 pg  Mean Cell Hemoglobin Concentration : 34.2 gm/dL  Auto Neutrophil # : 10.46 K/uL  Auto Lymphocyte # : 1.73 K/uL  Auto Monocyte # : 1.56 K/uL  Auto Eosinophil # : 0.30 K/uL  Auto Basophil # : 0.08 K/uL  Auto Neutrophil % : 72.6 %  Auto Lymphocyte % : 12.0 %  Auto Monocyte % : 10.8 %  Auto Eosinophil % : 2.1 %  Auto Basophil % : 0.6 %    11-09    132<L>  |  96  |  14  ----------------------------<  103<H>  3.8   |  23  |  0.68    Ca    8.9      09 Nov 2019 06:02  Mg     2.1     11-09        The current medications were reviewed   MEDICATIONS  (STANDING):  aspirin enteric coated 81 milliGRAM(s) Oral daily  atorvastatin 40 milliGRAM(s) Oral at bedtime  clopidogrel Tablet 75 milliGRAM(s) Oral daily  famotidine    Tablet 20 milliGRAM(s) Oral two times a day  heparin  Injectable 5000 Unit(s) SubCutaneous every 8 hours  lidocaine   Patch 1 Patch Transdermal every 24 hours  metoprolol tartrate 37.5 milliGRAM(s) Oral <User Schedule>  senna 2 Tablet(s) Oral at bedtime  sodium chloride 0.9% lock flush 3 milliLiter(s) IV Push every 8 hours    MEDICATIONS  (PRN):  acetaminophen   Tablet .. 650 milliGRAM(s) Oral every 6 hours PRN Mild Pain (1 - 3)  oxycodone    5 mG/acetaminophen 325 mG 1 Tablet(s) Oral every 4 hours PRN Severe Pain (7 - 10)  polyethylene glycol 3350 17 Gram(s) Oral daily PRN Constipation       PROBLEM LIST/ ASSESSMENT:  HEALTH ISSUES - PROBLEM Dx:      ,   Patient is a 59y old  Male who presents with a chief complaint of CAD (07 Nov 2019 21:34)     s/p cardiac surgery              My plan includes :  close hemodynamic, ventilatory and drain monitoring and management per post op routine    Monitor for arrhythmias and monitor parameters for organ perfusion  beta blockade not administered due to hemodynamic instability and bradycardia  monitor neurologic status  Head of the bed should remain elevated to 45 deg .   chest PT and IS will be encouraged  monitor adequacy of oxygenation and ventilation and attempt to wean oxygen  antibiotic regimen will be tailored to the clinical, laboratory and microbiologic data  Nutritional goals will be met using po eventually , ensure adequate caloric intake and montior the same  Stress ulcer and VTE prophylaxis will be achieved    Glycemic control is satisfactory  Electrolytes have been repleted as necessary and wound care has been carried out. Pain control has been achieved.   agressive physical therapy and early mobility and ambulation goals will be met   The family was updated about the course and plan  CRITICAL CARE TIME personally provided by me  in evaluation and management, reassessments, review and interpretation of labs and x-rays, ventilator and hemodynamic management, formulating a plan and coordinating care: ___90____ MIN.  Time does not include procedural time.  CTICU ATTENDING     					    Jeremy Baum MD

## 2019-11-09 LAB
ANION GAP SERPL CALC-SCNC: 13 MMOL/L — SIGNIFICANT CHANGE UP (ref 5–17)
BASOPHILS # BLD AUTO: 0.08 K/UL — SIGNIFICANT CHANGE UP (ref 0–0.2)
BASOPHILS NFR BLD AUTO: 0.6 % — SIGNIFICANT CHANGE UP (ref 0–2)
BUN SERPL-MCNC: 14 MG/DL — SIGNIFICANT CHANGE UP (ref 7–23)
CALCIUM SERPL-MCNC: 8.9 MG/DL — SIGNIFICANT CHANGE UP (ref 8.4–10.5)
CHLORIDE SERPL-SCNC: 96 MMOL/L — SIGNIFICANT CHANGE UP (ref 96–108)
CO2 SERPL-SCNC: 23 MMOL/L — SIGNIFICANT CHANGE UP (ref 22–31)
CREAT SERPL-MCNC: 0.68 MG/DL — SIGNIFICANT CHANGE UP (ref 0.5–1.3)
EOSINOPHIL # BLD AUTO: 0.3 K/UL — SIGNIFICANT CHANGE UP (ref 0–0.5)
EOSINOPHIL NFR BLD AUTO: 2.1 % — SIGNIFICANT CHANGE UP (ref 0–6)
GLUCOSE SERPL-MCNC: 103 MG/DL — HIGH (ref 70–99)
HCT VFR BLD CALC: 32.5 % — LOW (ref 39–50)
HGB BLD-MCNC: 11.1 G/DL — LOW (ref 13–17)
IMM GRANULOCYTES NFR BLD AUTO: 1.9 % — HIGH (ref 0–1.5)
LYMPHOCYTES # BLD AUTO: 1.73 K/UL — SIGNIFICANT CHANGE UP (ref 1–3.3)
LYMPHOCYTES # BLD AUTO: 12 % — LOW (ref 13–44)
MAGNESIUM SERPL-MCNC: 2.1 MG/DL — SIGNIFICANT CHANGE UP (ref 1.6–2.6)
MCHC RBC-ENTMCNC: 29.7 PG — SIGNIFICANT CHANGE UP (ref 27–34)
MCHC RBC-ENTMCNC: 34.2 GM/DL — SIGNIFICANT CHANGE UP (ref 32–36)
MCV RBC AUTO: 86.9 FL — SIGNIFICANT CHANGE UP (ref 80–100)
MONOCYTES # BLD AUTO: 1.56 K/UL — HIGH (ref 0–0.9)
MONOCYTES NFR BLD AUTO: 10.8 % — SIGNIFICANT CHANGE UP (ref 2–14)
NEUTROPHILS # BLD AUTO: 10.46 K/UL — HIGH (ref 1.8–7.4)
NEUTROPHILS NFR BLD AUTO: 72.6 % — SIGNIFICANT CHANGE UP (ref 43–77)
NRBC # BLD: 0 /100 WBCS — SIGNIFICANT CHANGE UP (ref 0–0)
PLATELET # BLD AUTO: 231 K/UL — SIGNIFICANT CHANGE UP (ref 150–400)
POTASSIUM SERPL-MCNC: 3.8 MMOL/L — SIGNIFICANT CHANGE UP (ref 3.5–5.3)
POTASSIUM SERPL-SCNC: 3.8 MMOL/L — SIGNIFICANT CHANGE UP (ref 3.5–5.3)
RBC # BLD: 3.74 M/UL — LOW (ref 4.2–5.8)
RBC # FLD: 13.2 % — SIGNIFICANT CHANGE UP (ref 10.3–14.5)
SODIUM SERPL-SCNC: 132 MMOL/L — LOW (ref 135–145)
WBC # BLD: 14.41 K/UL — HIGH (ref 3.8–10.5)
WBC # FLD AUTO: 14.41 K/UL — HIGH (ref 3.8–10.5)

## 2019-11-09 PROCEDURE — 71045 X-RAY EXAM CHEST 1 VIEW: CPT | Mod: 26

## 2019-11-09 RX ORDER — POTASSIUM CHLORIDE 20 MEQ
40 PACKET (EA) ORAL ONCE
Refills: 0 | Status: COMPLETED | OUTPATIENT
Start: 2019-11-09 | End: 2019-11-09

## 2019-11-09 RX ORDER — METOPROLOL TARTRATE 50 MG
37.5 TABLET ORAL
Refills: 0 | Status: DISCONTINUED | OUTPATIENT
Start: 2019-11-09 | End: 2019-11-10

## 2019-11-09 RX ORDER — LIDOCAINE 4 G/100G
1 CREAM TOPICAL EVERY 24 HOURS
Refills: 0 | Status: DISCONTINUED | OUTPATIENT
Start: 2019-11-09 | End: 2019-11-12

## 2019-11-09 RX ORDER — FENTANYL CITRATE 50 UG/ML
12.5 INJECTION INTRAVENOUS ONCE
Refills: 0 | Status: DISCONTINUED | OUTPATIENT
Start: 2019-11-09 | End: 2019-11-09

## 2019-11-09 RX ADMIN — Medication 40 MILLIEQUIVALENT(S): at 08:23

## 2019-11-09 RX ADMIN — FAMOTIDINE 20 MILLIGRAM(S): 10 INJECTION INTRAVENOUS at 18:03

## 2019-11-09 RX ADMIN — CLOPIDOGREL BISULFATE 75 MILLIGRAM(S): 75 TABLET, FILM COATED ORAL at 11:22

## 2019-11-09 RX ADMIN — SENNA PLUS 2 TABLET(S): 8.6 TABLET ORAL at 21:33

## 2019-11-09 RX ADMIN — LIDOCAINE 1 PATCH: 4 CREAM TOPICAL at 06:03

## 2019-11-09 RX ADMIN — FAMOTIDINE 20 MILLIGRAM(S): 10 INJECTION INTRAVENOUS at 06:30

## 2019-11-09 RX ADMIN — Medication 81 MILLIGRAM(S): at 11:22

## 2019-11-09 RX ADMIN — SODIUM CHLORIDE 3 MILLILITER(S): 9 INJECTION INTRAMUSCULAR; INTRAVENOUS; SUBCUTANEOUS at 13:28

## 2019-11-09 RX ADMIN — FENTANYL CITRATE 12.5 MICROGRAM(S): 50 INJECTION INTRAVENOUS at 01:53

## 2019-11-09 RX ADMIN — LIDOCAINE 1 PATCH: 4 CREAM TOPICAL at 01:51

## 2019-11-09 RX ADMIN — Medication 37.5 MILLIGRAM(S): at 13:37

## 2019-11-09 RX ADMIN — HEPARIN SODIUM 5000 UNIT(S): 5000 INJECTION INTRAVENOUS; SUBCUTANEOUS at 21:33

## 2019-11-09 RX ADMIN — Medication 25 MILLIGRAM(S): at 06:30

## 2019-11-09 RX ADMIN — LIDOCAINE 1 PATCH: 4 CREAM TOPICAL at 13:28

## 2019-11-09 RX ADMIN — HEPARIN SODIUM 5000 UNIT(S): 5000 INJECTION INTRAVENOUS; SUBCUTANEOUS at 06:29

## 2019-11-09 RX ADMIN — HEPARIN SODIUM 5000 UNIT(S): 5000 INJECTION INTRAVENOUS; SUBCUTANEOUS at 13:32

## 2019-11-09 RX ADMIN — SODIUM CHLORIDE 3 MILLILITER(S): 9 INJECTION INTRAMUSCULAR; INTRAVENOUS; SUBCUTANEOUS at 21:38

## 2019-11-09 RX ADMIN — SODIUM CHLORIDE 3 MILLILITER(S): 9 INJECTION INTRAMUSCULAR; INTRAVENOUS; SUBCUTANEOUS at 06:04

## 2019-11-09 RX ADMIN — ATORVASTATIN CALCIUM 40 MILLIGRAM(S): 80 TABLET, FILM COATED ORAL at 21:34

## 2019-11-09 RX ADMIN — Medication 37.5 MILLIGRAM(S): at 21:34

## 2019-11-09 RX ADMIN — FENTANYL CITRATE 12.5 MICROGRAM(S): 50 INJECTION INTRAVENOUS at 02:23

## 2019-11-09 NOTE — CHART NOTE - NSCHARTNOTEFT_GEN_A_CORE
mediastinal shine drain with minimal output, CXR stable after clamp trial x 4 hours. Per Dr. Torres shine was pulled at the bedside. A tie down was secured in place and an occlusive dressing was placed over the site. Patient tolerated the procedure and remained hemodynamically stable. Portable chest Xray ordered, no obvious pneumothorax, official read pending.

## 2019-11-09 NOTE — PROGRESS NOTE ADULT - SUBJECTIVE AND OBJECTIVE BOX
Patient discussed on morning rounds with       Operation / Date: 11/6/2019 OPCAB x 3 EF 45%    SUBJECTIVE ASSESSMENT:  59y Male seen and examined at the bedside. He reports feeling well today         Vital Signs Last 24 Hrs  T(C): 36.2 (09 Nov 2019 09:16), Max: 37.3 (08 Nov 2019 18:32)  T(F): 97.1 (09 Nov 2019 09:16), Max: 99.1 (08 Nov 2019 18:32)  HR: 106 (09 Nov 2019 13:04) (86 - 108)  BP: 171/109 (09 Nov 2019 13:04) (126/79 - 174/102)  BP(mean): 129 (09 Nov 2019 13:04) (95 - 129)  RR: 12 (09 Nov 2019 13:04) (12 - 22)  SpO2: 96% (09 Nov 2019 13:04) (92% - 98%)  I&O's Detail    08 Nov 2019 07:01  -  09 Nov 2019 07:00  --------------------------------------------------------  IN:    sodium chloride 0.9%: 40 mL  Total IN: 40 mL    OUT:    Chest Tube: 60 mL    Chest Tube: 150 mL    Indwelling Catheter - Urethral: 125 mL    Voided: 575 mL  Total OUT: 910 mL    Total NET: -870 mL      09 Nov 2019 07:01  -  09 Nov 2019 13:35  --------------------------------------------------------  IN:  Total IN: 0 mL    OUT:    Chest Tube: 140 mL  Total OUT: 140 mL    Total NET: -140 mL          CHEST TUBE:  Yes/No. AIR LEAKS: Yes/No. Suction / H2O SEAL.   YI DRAIN:  Yes/No.  EPICARDIAL WIRES: Yes/No.  TIE DOWNS: Yes/No.  IVORY: Yes/No.    PHYSICAL EXAM:    General:     Neurological:    Cardiovascular:    Respiratory:    Gastrointestinal:    Extremities:    Vascular:    Incision Sites:    LABS:                        11.1   14.41 )-----------( 231      ( 09 Nov 2019 06:02 )             32.5       COUMADIN:  Yes/No. REASON: .        11-09    132<L>  |  96  |  14  ----------------------------<  103<H>  3.8   |  23  |  0.68    Ca    8.9      09 Nov 2019 06:02  Mg     2.1     11-09            MEDICATIONS  (STANDING):  aspirin enteric coated 81 milliGRAM(s) Oral daily  atorvastatin 40 milliGRAM(s) Oral at bedtime  clopidogrel Tablet 75 milliGRAM(s) Oral daily  famotidine    Tablet 20 milliGRAM(s) Oral two times a day  heparin  Injectable 5000 Unit(s) SubCutaneous every 8 hours  lidocaine   Patch 1 Patch Transdermal every 24 hours  metoprolol tartrate 37.5 milliGRAM(s) Oral <User Schedule>  senna 2 Tablet(s) Oral at bedtime  sodium chloride 0.9% lock flush 3 milliLiter(s) IV Push every 8 hours    MEDICATIONS  (PRN):  acetaminophen   Tablet .. 650 milliGRAM(s) Oral every 6 hours PRN Mild Pain (1 - 3)  oxycodone    5 mG/acetaminophen 325 mG 1 Tablet(s) Oral every 4 hours PRN Severe Pain (7 - 10)  polyethylene glycol 3350 17 Gram(s) Oral daily PRN Constipation        RADIOLOGY & ADDITIONAL TESTS: Patient discussed on morning rounds with       Operation / Date: 11/6/2019 OPCAB x 3 EF 45%    SUBJECTIVE ASSESSMENT:  59y Male seen and examined at the bedside. He reports feeling well today denies having pain at this time. He states he is waiting to ambulate this morning. He reports passing gas, no BM yet. States he is having some back pain this morning. Denies CP, SOB, ELIZABETH, abd pain n/v/d        Vital Signs Last 24 Hrs  T(C): 36.2 (09 Nov 2019 09:16), Max: 37.3 (08 Nov 2019 18:32)  T(F): 97.1 (09 Nov 2019 09:16), Max: 99.1 (08 Nov 2019 18:32)  HR: 106 (09 Nov 2019 13:04) (86 - 108)  BP: 171/109 (09 Nov 2019 13:04) (126/79 - 174/102)  BP(mean): 129 (09 Nov 2019 13:04) (95 - 129)  RR: 12 (09 Nov 2019 13:04) (12 - 22)  SpO2: 96% (09 Nov 2019 13:04) (92% - 98%)  I&O's Detail    08 Nov 2019 07:01  -  09 Nov 2019 07:00  --------------------------------------------------------  IN:    sodium chloride 0.9%: 40 mL  Total IN: 40 mL    OUT:    Chest Tube: 60 mL    Chest Tube: 150 mL    Indwelling Catheter - Urethral: 125 mL    Voided: 575 mL  Total OUT: 910 mL    Total NET: -870 mL      09 Nov 2019 07:01  -  09 Nov 2019 13:35  --------------------------------------------------------  IN:  Total IN: 0 mL    OUT:    Chest Tube: 140 mL  Total OUT: 140 mL    Total NET: -140 mL          CHEST TUBE:  Ye. AIR LEAKS:No. Suction  YI DRAIN:  Yes/No.  EPICARDIAL WIRES: Yes/No.  TIE DOWNS: Yes/No.  DIANELYS: Yes/No.    PHYSICAL EXAM:    General:     Neurological:    Cardiovascular:    Respiratory:    Gastrointestinal:    Extremities:    Vascular:    Incision Sites:    LABS:                        11.1   14.41 )-----------( 231      ( 09 Nov 2019 06:02 )             32.5       COUMADIN:  Yes/No. REASON: .        11-09    132<L>  |  96  |  14  ----------------------------<  103<H>  3.8   |  23  |  0.68    Ca    8.9      09 Nov 2019 06:02  Mg     2.1     11-09            MEDICATIONS  (STANDING):  aspirin enteric coated 81 milliGRAM(s) Oral daily  atorvastatin 40 milliGRAM(s) Oral at bedtime  clopidogrel Tablet 75 milliGRAM(s) Oral daily  famotidine    Tablet 20 milliGRAM(s) Oral two times a day  heparin  Injectable 5000 Unit(s) SubCutaneous every 8 hours  lidocaine   Patch 1 Patch Transdermal every 24 hours  metoprolol tartrate 37.5 milliGRAM(s) Oral <User Schedule>  senna 2 Tablet(s) Oral at bedtime  sodium chloride 0.9% lock flush 3 milliLiter(s) IV Push every 8 hours    MEDICATIONS  (PRN):  acetaminophen   Tablet .. 650 milliGRAM(s) Oral every 6 hours PRN Mild Pain (1 - 3)  oxycodone    5 mG/acetaminophen 325 mG 1 Tablet(s) Oral every 4 hours PRN Severe Pain (7 - 10)  polyethylene glycol 3350 17 Gram(s) Oral daily PRN Constipation        RADIOLOGY & ADDITIONAL TESTS: Patient discussed on morning rounds with       Operation / Date: 11/6/2019 OPCAB x 3 EF 45%    SUBJECTIVE ASSESSMENT:  59y Male seen and examined at the bedside. He reports feeling well today denies having pain at this time. He states he is waiting to ambulate this morning. He reports passing gas, no BM yet. States he is having some back pain this morning. Denies CP, SOB, ELIZABETH, abd pain n/v/d        Vital Signs Last 24 Hrs  T(C): 36.2 (09 Nov 2019 09:16), Max: 37.3 (08 Nov 2019 18:32)  T(F): 97.1 (09 Nov 2019 09:16), Max: 99.1 (08 Nov 2019 18:32)  HR: 106 (09 Nov 2019 13:04) (86 - 108)  BP: 171/109 (09 Nov 2019 13:04) (126/79 - 174/102)  BP(mean): 129 (09 Nov 2019 13:04) (95 - 129)  RR: 12 (09 Nov 2019 13:04) (12 - 22)  SpO2: 96% (09 Nov 2019 13:04) (92% - 98%)  I&O's Detail    08 Nov 2019 07:01  -  09 Nov 2019 07:00  --------------------------------------------------------  IN:    sodium chloride 0.9%: 40 mL  Total IN: 40 mL    OUT:    Chest Tube: 60 mL    Chest Tube: 150 mL    Indwelling Catheter - Urethral: 125 mL    Voided: 575 mL  Total OUT: 910 mL    Total NET: -870 mL      09 Nov 2019 07:01  -  09 Nov 2019 13:35  --------------------------------------------------------  IN:  Total IN: 0 mL    OUT:    Chest Tube: 140 mL  Total OUT: 140 mL    Total NET: -140 mL          CHEST TUBE:  Yes. AIR LEAKS:No. Suction  YI DRAIN:  NO  EPICARDIAL WIRES: Yes.  TIE DOWNS: Yes  IVORY: NO    PHYSICAL EXAM:    General: Sitting in chair, NAD    Neurological: A&O x3, no focal deficits, strength 5/5 throughout    Cardiovascular: RRR, normal s1 s2, no M/R/G    Respiratory: Non-labored breathing, chest expansion symmetric, CTA b/l, no W/R/R    Gastrointestinal: +BS x4 quadrant, soft, non-tender to palpation, non-distended    Extremities: WWP, no pitting edema, no calf tenderness or erythema    Vascular: 2+radial pulses right    Incision: MDI, CD&I, no discharge, no evidence of dehiscence, no sternal click. left radial surgical site in tact, no drainage, no hematoma     LABS:                        11.1   14.41 )-----------( 231      ( 09 Nov 2019 06:02 )             32.5       COUMADIN:  no        11-09    132<L>  |  96  |  14  ----------------------------<  103<H>  3.8   |  23  |  0.68    Ca    8.9      09 Nov 2019 06:02  Mg     2.1     11-09            MEDICATIONS  (STANDING):  aspirin enteric coated 81 milliGRAM(s) Oral daily  atorvastatin 40 milliGRAM(s) Oral at bedtime  clopidogrel Tablet 75 milliGRAM(s) Oral daily  famotidine    Tablet 20 milliGRAM(s) Oral two times a day  heparin  Injectable 5000 Unit(s) SubCutaneous every 8 hours  lidocaine   Patch 1 Patch Transdermal every 24 hours  metoprolol tartrate 37.5 milliGRAM(s) Oral <User Schedule>  senna 2 Tablet(s) Oral at bedtime  sodium chloride 0.9% lock flush 3 milliLiter(s) IV Push every 8 hours    MEDICATIONS  (PRN):  acetaminophen   Tablet .. 650 milliGRAM(s) Oral every 6 hours PRN Mild Pain (1 - 3)  oxycodone    5 mG/acetaminophen 325 mG 1 Tablet(s) Oral every 4 hours PRN Severe Pain (7 - 10)  polyethylene glycol 3350 17 Gram(s) Oral daily PRN Constipation        RADIOLOGY & ADDITIONAL TESTS:    < from: Xray Chest 1 View- PORTABLE-Urgent (11.09.19 @ 13:30) >  Portable exam of the chest demonstrates cardiomegaly. Limited   inspiration. Left chest tube. Left effusion. Congestive change cannot be   excluded. Status post sternotomy    Impression: Left effusion. Prominent bronchovascular markings. Congestive   change cannot be excluded. Limited inspiration    < end of copied text >

## 2019-11-09 NOTE — PROGRESS NOTE ADULT - ASSESSMENT
58 yo male with a PMH of psoriasis, Hypertension, Hyperlipidemia, obesity and known CAD s/p PTCA/RADHA LAD and D1 presented to cardiologist with CCS class II symptoms. Cardiac cath was performed revealing 3 vessel CAD.   Dr. Torres reviewed the cardiac cath imaging and reports with the patient and  his wife and discussed the case with Dr. ALEXX Lemus.  Dr. Torres discussed the risks, benefits and alternatives to surgery. Risks include but not limited to death, heart attack, bleeding, stroke, kidney problems and infection. He quoted a 1% operative mortality and complication risk.  He also discussed the various approaches, minimally invasive versus sternotomy. Dr. Torres feels the patient will benefit and is a candidate for a off pump CABG. All questions were addressed. 60 yo male with a PMH of psoriasis, Hypertension, Hyperlipidemia, obesity and known CAD s/p PTCA/RADHA LAD and D1 presented to cardiologist with CCS class II symptoms. Cardiac cath was performed revealing 3 vessel CAD. On 11/6 pt underwent OPCAB x 3 EF 45%. Operation was uncomplicated and pt arrived to the [facility] extubated. He was noted to have an airleak in his CT on POD 1. His mediastinal and left pleural chest tube were placed to water seal on POD 2. On POD 3 CXR significant for small apical ptx and pleural tube was placed back on suction, mediastinal tube was clamped. Follow up afternoon CXR revealed re-expansion of the lung. Mediastinal tube was removed at the bedside, f/u CXR stable. 60 yo male with a PMH of psoriasis, Hypertension, Hyperlipidemia, obesity and known CAD s/p PTCA/RADHA LAD and D1 presented to cardiologist with CCS class II symptoms. Cardiac cath was performed revealing 3 vessel CAD. On 11/6 pt underwent OPCAB x 3 EF 45%. Operation was uncomplicated and pt arrived to the [facility] extubated. He was noted to have an airleak in his CT on POD 1. His mediastinal and left pleural chest tube were placed to water seal on POD 2. On POD 3 CXR significant for small apical ptx and pleural tube was placed back on suction, mediastinal tube was clamped. Follow up afternoon CXR revealed re-expansion of the lung. Mediastinal tube was removed at the bedside, f/u CXR stable.       Neuro: Pain well controlled with current regimen  - No delirium, Mentating well  - Tylenol 650 PRN for mild pain   - Oxycodone 5/325 PRN for severe pain    Respiratory: Spo2 94% on 2L NC  - CXR this morning showed small apical left ptx  - Left pleural CT placed back on suction. mediastinal tube was clamped  - F/u CXR showing re-expansion of left lung   - Mediastinal tube was removed at the bedside, f/u CXR stable. Keep pleural chest tube to suction  - Continue to wean O2 as tolerated  - Encourage ambulation C+DB and use of IS 10x / hr while awake    Cardiac s/p OPCABx3 EF 45%  - Pt in NSR  - Continue ASA, plavix, statin  - Metoprolol titrated up to 37.5mg q8h, titrate up as tolerated by HR/BP  - Continue to monitor hr/bp/tele    GI: +flatus - BM  - Continue bowel regimen  - GI PPX with protonix   - PO diet    Renal/: BUN/Cr (14/0.68)  - Monitor renal function  - Monitor I/Os  - Replete K<4.0, Mg<2.0    Heme: H&H 11.1/32  - H&H stable, continue to monitor   - DVT ppx with 5000u SQH, SCDs    Endocrinology  - A1C 5.4  - TSH WNL    ID: afebrile WBC 14  - WBC downtrending, continue to monitor  - Observe for SIRS/Sepsis Syndrome    Dispo: Home when medically cleared

## 2019-11-10 ENCOUNTER — TRANSCRIPTION ENCOUNTER (OUTPATIENT)
Age: 59
End: 2019-11-10

## 2019-11-10 LAB
ANION GAP SERPL CALC-SCNC: 10 MMOL/L — SIGNIFICANT CHANGE UP (ref 5–17)
BUN SERPL-MCNC: 10 MG/DL — SIGNIFICANT CHANGE UP (ref 7–23)
CALCIUM SERPL-MCNC: 8.8 MG/DL — SIGNIFICANT CHANGE UP (ref 8.4–10.5)
CHLORIDE SERPL-SCNC: 97 MMOL/L — SIGNIFICANT CHANGE UP (ref 96–108)
CO2 SERPL-SCNC: 24 MMOL/L — SIGNIFICANT CHANGE UP (ref 22–31)
CREAT SERPL-MCNC: 0.73 MG/DL — SIGNIFICANT CHANGE UP (ref 0.5–1.3)
GLUCOSE SERPL-MCNC: 104 MG/DL — HIGH (ref 70–99)
HCT VFR BLD CALC: 31.5 % — LOW (ref 39–50)
HGB BLD-MCNC: 10.6 G/DL — LOW (ref 13–17)
MAGNESIUM SERPL-MCNC: 2 MG/DL — SIGNIFICANT CHANGE UP (ref 1.6–2.6)
MCHC RBC-ENTMCNC: 29.4 PG — SIGNIFICANT CHANGE UP (ref 27–34)
MCHC RBC-ENTMCNC: 33.7 GM/DL — SIGNIFICANT CHANGE UP (ref 32–36)
MCV RBC AUTO: 87.5 FL — SIGNIFICANT CHANGE UP (ref 80–100)
NRBC # BLD: 0 /100 WBCS — SIGNIFICANT CHANGE UP (ref 0–0)
PLATELET # BLD AUTO: 263 K/UL — SIGNIFICANT CHANGE UP (ref 150–400)
POTASSIUM SERPL-MCNC: 3.6 MMOL/L — SIGNIFICANT CHANGE UP (ref 3.5–5.3)
POTASSIUM SERPL-SCNC: 3.6 MMOL/L — SIGNIFICANT CHANGE UP (ref 3.5–5.3)
RBC # BLD: 3.6 M/UL — LOW (ref 4.2–5.8)
RBC # FLD: 13.2 % — SIGNIFICANT CHANGE UP (ref 10.3–14.5)
SODIUM SERPL-SCNC: 131 MMOL/L — LOW (ref 135–145)
WBC # BLD: 13.34 K/UL — HIGH (ref 3.8–10.5)
WBC # FLD AUTO: 13.34 K/UL — HIGH (ref 3.8–10.5)

## 2019-11-10 PROCEDURE — 71045 X-RAY EXAM CHEST 1 VIEW: CPT | Mod: 26,77

## 2019-11-10 PROCEDURE — 71045 X-RAY EXAM CHEST 1 VIEW: CPT | Mod: 26

## 2019-11-10 RX ORDER — POTASSIUM CHLORIDE 20 MEQ
40 PACKET (EA) ORAL ONCE
Refills: 0 | Status: COMPLETED | OUTPATIENT
Start: 2019-11-10 | End: 2019-11-10

## 2019-11-10 RX ORDER — METOPROLOL TARTRATE 50 MG
12.5 TABLET ORAL ONCE
Refills: 0 | Status: COMPLETED | OUTPATIENT
Start: 2019-11-10 | End: 2019-11-10

## 2019-11-10 RX ORDER — DILTIAZEM HCL 120 MG
30 CAPSULE, EXT RELEASE 24 HR ORAL EVERY 6 HOURS
Refills: 0 | Status: DISCONTINUED | OUTPATIENT
Start: 2019-11-10 | End: 2019-11-12

## 2019-11-10 RX ORDER — METOPROLOL TARTRATE 50 MG
50 TABLET ORAL
Refills: 0 | Status: DISCONTINUED | OUTPATIENT
Start: 2019-11-10 | End: 2019-11-12

## 2019-11-10 RX ADMIN — HEPARIN SODIUM 5000 UNIT(S): 5000 INJECTION INTRAVENOUS; SUBCUTANEOUS at 14:35

## 2019-11-10 RX ADMIN — Medication 40 MILLIEQUIVALENT(S): at 07:02

## 2019-11-10 RX ADMIN — Medication 30 MILLIGRAM(S): at 18:09

## 2019-11-10 RX ADMIN — CLOPIDOGREL BISULFATE 75 MILLIGRAM(S): 75 TABLET, FILM COATED ORAL at 11:28

## 2019-11-10 RX ADMIN — ATORVASTATIN CALCIUM 40 MILLIGRAM(S): 80 TABLET, FILM COATED ORAL at 22:06

## 2019-11-10 RX ADMIN — Medication 50 MILLIGRAM(S): at 14:35

## 2019-11-10 RX ADMIN — HEPARIN SODIUM 5000 UNIT(S): 5000 INJECTION INTRAVENOUS; SUBCUTANEOUS at 22:03

## 2019-11-10 RX ADMIN — Medication 12.5 MILLIGRAM(S): at 06:13

## 2019-11-10 RX ADMIN — SODIUM CHLORIDE 3 MILLILITER(S): 9 INJECTION INTRAMUSCULAR; INTRAVENOUS; SUBCUTANEOUS at 14:38

## 2019-11-10 RX ADMIN — SENNA PLUS 2 TABLET(S): 8.6 TABLET ORAL at 22:03

## 2019-11-10 RX ADMIN — FAMOTIDINE 20 MILLIGRAM(S): 10 INJECTION INTRAVENOUS at 05:44

## 2019-11-10 RX ADMIN — Medication 37.5 MILLIGRAM(S): at 05:44

## 2019-11-10 RX ADMIN — LIDOCAINE 1 PATCH: 4 CREAM TOPICAL at 05:48

## 2019-11-10 RX ADMIN — Medication 50 MILLIGRAM(S): at 22:02

## 2019-11-10 RX ADMIN — LIDOCAINE 1 PATCH: 4 CREAM TOPICAL at 01:00

## 2019-11-10 RX ADMIN — FAMOTIDINE 20 MILLIGRAM(S): 10 INJECTION INTRAVENOUS at 18:09

## 2019-11-10 RX ADMIN — SODIUM CHLORIDE 3 MILLILITER(S): 9 INJECTION INTRAMUSCULAR; INTRAVENOUS; SUBCUTANEOUS at 21:59

## 2019-11-10 RX ADMIN — HEPARIN SODIUM 5000 UNIT(S): 5000 INJECTION INTRAVENOUS; SUBCUTANEOUS at 05:44

## 2019-11-10 RX ADMIN — Medication 30 MILLIGRAM(S): at 22:02

## 2019-11-10 RX ADMIN — Medication 81 MILLIGRAM(S): at 11:28

## 2019-11-10 RX ADMIN — SODIUM CHLORIDE 3 MILLILITER(S): 9 INJECTION INTRAMUSCULAR; INTRAVENOUS; SUBCUTANEOUS at 05:48

## 2019-11-10 NOTE — DISCHARGE NOTE PROVIDER - NSDCFUADDAPPT_GEN_ALL_CORE_FT
-Please follow up with Dr. Torres on ___.  The office is located at [facility], [facility], 4th floor. Call us with any questions  #[phone].    - Please follow up with you cardiologist Dr. Lemus on ____    -Walk daily as tolerated and use your incentive spirometer every hour.    -No driving or strenuous activity/exercise for 6 weeks, or until  cleared by your surgeon.    -Gently clean your incisions with anti-bacterial soap and water, pat  dry.  You may leave them open to air.    -Call your doctor if you have shortness of breath, chest pain not  relieved by pain medication, dizziness, fever >101.5, or increased  redness or drainage from incisions. -Please follow up with Dr. Torres on ___.  The office is located at [facility], [facility], 4th floor. Call us with any questions  #[phone].    - You have a stitch in place. This will be removed at your follow-up appointment with Dr. Torres.     - Please follow up with you cardiologist Dr. Lemus on ____    -Walk daily as tolerated and use your incentive spirometer every hour.    -No driving or strenuous activity/exercise for 6 weeks, or until  cleared by your surgeon.    -Gently clean your incisions with anti-bacterial soap and water, pat  dry.  You may leave them open to air.    -Call your doctor if you have shortness of breath, chest pain not  relieved by pain medication, dizziness, fever >101.5, or increased  redness or drainage from incisions. Dr. Torres (CT Surgery): Dr. Torres's office will call you with an appointment.  If you have not heard back from them by Thursday, 11/14/19, please call them to confirm your appointment.     Dr. Lemus (Cardiology): Please call Dr. Lemus's office on Wednesday 11/13/19 to schedule your outpatient appointment.     Please follow-up with your primary care provider within 2-4 weeks after being discharged to discuss your hospital stay.

## 2019-11-10 NOTE — DIETITIAN INITIAL EVALUATION ADULT. - ENERGY NEEDS
Ideal body weight used for calculations as pt >120% of IBW.   .9kg, IBW 61kg, 177% IBW, ht 65", BMI 40   Nutrient needs based on [facility] standards of care for maintenance in adults, adjusted for post-op needs, fluid per team

## 2019-11-10 NOTE — PROGRESS NOTE ADULT - ASSESSMENT
58 yo male with a PMH of psoriasis, Hypertension, Hyperlipidemia, obesity and known CAD s/p PTCA/RADHA LAD and D1 presented to cardiologist with CCS class II symptoms. Cardiac cath was performed revealing 3 vessel CAD. On 11/6 pt underwent OPCAB x 3 EF 45%. Operation was uncomplicated and pt arrived to [facility] extubated. He was noted to have an airleak in his CT on POD 1. His mediastinal and left pleural chest tube were placed to water seal on POD 2. On POD 3 CXR significant for small apical ptx and pleural tube was placed back on suction, mediastinal tube was clamped. Follow up afternoon CXR revealed re-expansion of the lung. Mediastinal tube removed at the bedside, f/u CXR with small left apical pnuemothorax. Today on POD 4, L pleural tube placed on waterseal.      Neuro: Pain well controlled with current regimen  - No delirium, Mentating well  - Tylenol 650 PRN for mild pain   - Oxycodone 5/325 PRN for severe pain  - Lidocaine patch    Respiratory: Spo2 95% on RA  - CXR this morning showed small apical left ptx, stable  - L pleural chest tube with no airleak, placed on water seal. Will follow up CXR at 6pm. If stable will clamp tube and follow up am CXR. If stable, will remove CT in am.   - Encourage ambulation C+DB and use of IS 10x / hr while awake    Cardiac POD 4 OPCABx3 EF 45%  - Pt in NSR  - Continue ASA, plavix, statin  - Metoprolol to 50mg q8h, titrate up as tolerated by HR/BP  - Will d/w Dr. Torres about adding CCB given radial graft  - Continue to monitor hr/bp/tele    GI: no active issues  - Continue bowel regimen  - GI PPX with protonix   - PO diet    Renal/: BUN/Cr (10/0.73)  - Monitor renal function  - Monitor I/Os  - Replete K<4.0, Mg<2.0    Heme: H&H 10.6/31.5  - H&H stable, continue to monitor   - DVT ppx with 5000u SQH, SCDs    Endocrinology  - A1C 5.4, blood sugars well controlled   - TSH WNL    ID: afebrile WBC 13  - WBC downtrending, continue to monitor  - Observe for SIRS/Sepsis Syndrome    Dispo: Home when medically cleared

## 2019-11-10 NOTE — DISCHARGE NOTE PROVIDER - HOSPITAL COURSE
58 yo male with a PMH of psoriasis, Hypertension, Hyperlipidemia, obesity and known CAD s/p PTCA/RADHA LAD and D1 presented to cardiologist with CCS class II symptoms. Cardiac cath was performed revealing 3 vessel CAD. On 11/6 pt underwent OPCAB x 3 EF 45%. Operation was uncomplicated and pt arrived [facility] ext[facility]ated. He was noted to have an airleak in his CT on POD 1. His mediastinal and left pleural chest tube were placed to water seal on POD 2. Follow up CXR on POD 3 significant for small apical ptx and pleural tube was placed back on suction, mediastinal tube was clamped. Follow up afternoon CXR revealed re-expansion of the lung. Mediastinal tube removed at the bedside, f/u CXR with small left apical pnuemothorax. Today on POD 4, L pleural tube placed on waterseal....            35 minutes was spent with the patient reviewing the discharge material including medications, follow up appointments, recovery, concerning symptoms, and how to contact their health care providers if they have questions This is a 60 y/o M with a PMHx of Hypertension, Hyperlipidemia, obesity, psorasis, cholecystectomy and known CAD s/p PTCA/RADHA LAD & D1 presented to cardiologist with CCS class II symptoms. Cardiac cath was performed revealing 3-vessel CAD. On 11/6 pt underwent OPCAB x 3 EF 45%. OR course was uncomplicated and patient arrived to the [facility] extubated. He was noted to have an airleak in his CT on POD 1. His mediastinal and left pleural chest tube were placed to water seal on POD 2. Follow up CXR on POD 3 significant for small apical ptx and pleural tube was placed back on suction, mediastinal tube was clamped. Follow up afternoon CXR revealed re-expansion of the lung. Mediastinal tube removed at the bedside, f/u CXR with small left apical pnuemothorax. On POD 4 the L pleural CT was placed on waterseal with a f/u CXR showing small left apical pnuemothorax again. On POD 5, the CT remained on suction throughout the day and through the night. Today, on POD 6, CT removed after AM CXR was stable. In addition, CT tie downs and V-wires were removed. Per Dr. Torres, patient is medically ready to be discharged home.         CABG    Aspirin               [ x ] Yes  [  ] Contraindicated, Reason_______________________________    Beta-Blocker     [ x ] Yes  [  ]Contraindicated, Reason_______________________________    Statin                 [ x ] Yes  [  ] Contraindicated, Reason_______________________________        35 minutes was spent with the patient reviewing the discharge material including medications, follow up appointments, recovery, concerning symptoms, and how to contact their health care providers if they have questions This is a 60 y/o M with a PMHx of Hypertension, Hyperlipidemia, obesity, psorasis, cholecystectomy and known CAD s/p PTCA/RADHA LAD & D1 presented to cardiologist with CCS class II symptoms. Cardiac cath was performed revealing 3-vessel CAD. On 11/6 pt underwent OPCAB x 3 EF 45%. OR course was uncomplicated and patient arrived to the [facility] extubated. He was noted to have an airleak in his CT on POD 1. His mediastinal and left pleural chest tube were placed to water seal on POD 2. Follow up CXR on POD 3 significant for small apical ptx and pleural tube was placed back on suction, mediastinal tube was clamped. Follow up afternoon CXR revealed re-expansion of the lung. Mediastinal tube removed at the bedside, f/u CXR with small left apical pnuemothorax. On POD 4 the L pleural CT was placed on waterseal with a f/u CXR showing small left apical pnuemothorax again. On POD 5, the CT remained on suction throughout the day and through the night. Today, on POD 6, CT removed after AM CXR was stable. In addition, CT tie downs and V-wires were removed. Patient has 1 tie down left in place to be removed at f/u appointment. Per Dr. Torres, patient is medically ready to be discharged home.         CABG    Aspirin               [ x ] Yes  [  ] Contraindicated, Reason_______________________________    Beta-Blocker     [ x ] Yes  [  ]Contraindicated, Reason_______________________________    Statin                 [ x ] Yes  [  ] Contraindicated, Reason_______________________________        35 minutes was spent with the patient reviewing the discharge material including medications, follow up appointments, recovery, concerning symptoms, and how to contact their health care providers if they have questions

## 2019-11-10 NOTE — DISCHARGE NOTE PROVIDER - NSDCACTIVITY_GEN_ALL_CORE
Walking - Indoors allowed/No heavy lifting/straining/Stairs allowed/Showering allowed/Walking - Outdoors allowed

## 2019-11-10 NOTE — PROGRESS NOTE ADULT - SUBJECTIVE AND OBJECTIVE BOX
Patient discussed on morning rounds with Dr. Torres     Operation / Date: 11/6/2019 OPCAB x 3 EF 45%    SUBJECTIVE ASSESSMENT:  59y Male seen and examined at the bedside. He states that he feels tired today, he didn't sleep well last night, states he felt uncomfortable but was not in any pain. He is using his IS pulling 1000 today. He is having regular BM. Denies CP, SOB, abd pain n/v/d calf pain.          Vital Signs Last 24 Hrs  T(C): 36.4 (10 Nov 2019 10:17), Max: 37.2 (10 Nov 2019 01:01)  T(F): 97.5 (10 Nov 2019 10:17), Max: 98.9 (10 Nov 2019 01:01)  HR: 84 (10 Nov 2019 08:24) (84 - 106)  BP: 125/79 (10 Nov 2019 08:24) (125/79 - 173/94)  BP(mean): 97 (10 Nov 2019 08:24) (97 - 129)  RR: 17 (10 Nov 2019 08:24) (12 - 18)  SpO2: 96% (10 Nov 2019 08:24) (95% - 97%)  I&O's Detail    09 Nov 2019 07:01  -  10 Nov 2019 07:00  --------------------------------------------------------  IN:    Oral Fluid: 240 mL  Total IN: 240 mL    OUT:    Chest Tube: 240 mL    Voided: 900 mL  Total OUT: 1140 mL    Total NET: -900 mL      10 Nov 2019 07:01  -  10 Nov 2019 12:47  --------------------------------------------------------  IN:  Total IN: 0 mL    OUT:    Chest Tube: 60 mL  Total OUT: 60 mL    Total NET: -60 mL          CHEST TUBE: yes L pleural on water seal no airleak  YI DRAIN:  No.  EPICARDIAL WIRES: No.  TIE DOWNS: Yes  IVORY: No.    PHYSICAL EXAM:    General: Lying comfortably in bed, NAD    Neurological: A&O x3, no focal deficits, strength 5/5 throughout, sensation grossly preserved    Cardiovascular: RRR, normal s1 s2, no M/R/G    Respiratory: Non-labored breathing, chest expansion symmetric, CTA b/l, no W/R/R    Gastrointestinal: +BS x4 quadrant, soft, non-tender to palpation, non-distended    Extremities: WWP, no pitting edema, no calf tenderness or erythema    Vascular: 2+radial pulses R radial pulse, 2+DP pulses b/l    Incision: MDI, CD&I, no discharge, no drainage, no evidence of dehiscence, no sternal click. L radial artery harvest site CD&I, no drainage, no hematoma    LABS:                        10.6   13.34 )-----------( 263      ( 10 Nov 2019 05:51 )             31.5       COUMADIN:  no      11-10    131<L>  |  97  |  10  ----------------------------<  104<H>  3.6   |  24  |  0.73    Ca    8.8      10 Nov 2019 05:51  Mg     2.0     11-10            MEDICATIONS  (STANDING):  aspirin enteric coated 81 milliGRAM(s) Oral daily  atorvastatin 40 milliGRAM(s) Oral at bedtime  clopidogrel Tablet 75 milliGRAM(s) Oral daily  famotidine    Tablet 20 milliGRAM(s) Oral two times a day  heparin  Injectable 5000 Unit(s) SubCutaneous every 8 hours  lidocaine   Patch 1 Patch Transdermal every 24 hours  metoprolol tartrate 50 milliGRAM(s) Oral <User Schedule>  senna 2 Tablet(s) Oral at bedtime  sodium chloride 0.9% lock flush 3 milliLiter(s) IV Push every 8 hours    MEDICATIONS  (PRN):  acetaminophen   Tablet .. 650 milliGRAM(s) Oral every 6 hours PRN Mild Pain (1 - 3)  oxycodone    5 mG/acetaminophen 325 mG 1 Tablet(s) Oral every 4 hours PRN Severe Pain (7 - 10)  polyethylene glycol 3350 17 Gram(s) Oral daily PRN Constipation        RADIOLOGY & ADDITIONAL TESTS:    < from: Xray Chest 1 View-PORTABLE IMMEDIATE (11.10.19 @ 07:11) >  Portable examination of the chest demonstrates patient to be status post   sternotomy. Left chest tube noted. Cardiomegaly. Discoid changes left   lung base. Improvement left effusion in comparison to prior examination   of the chest 11/9/2019.    Impression: Improvement left effusion. Discoid change left lung base    < end of copied text >

## 2019-11-10 NOTE — DISCHARGE NOTE PROVIDER - NSDCMRMEDTOKEN_GEN_ALL_CORE_FT
Aspirin Enteric Coated 81 mg oral delayed release tablet: 1 tab(s) orally once a day  lisinopril-hydrochlorothiazide 10 mg-12.5 mg oral tablet: 1 tab(s) orally once a day  Norvasc 10 mg oral tablet: 1 tab(s) orally once a day  rosuvastatin 40 mg oral tablet: 1 tab(s) orally once a day  Toprol-XL 25 mg oral tablet, extended release: 1 tab(s) orally once a day acetaminophen 325 mg oral tablet: 1 tab(s) orally every 6 hours, As Needed -Mild Pain (1 - 3) MDD:8 tabs  aspirin 81 mg oral delayed release tablet: 1 tab(s) orally once a day  Cardizem 120 mg oral tablet: 1 tab(s) orally once a day   clopidogrel 75 mg oral tablet: 1 tab(s) orally once a day  famotidine 20 mg oral tablet: 1 tab(s) orally 2 times a day  Lasix 20 mg oral tablet: 1 tab(s) orally once a day   metoprolol tartrate 75 mg oral tablet: 1 tab(s) orally 2 times a day   oxycodone-acetaminophen 5 mg-325 mg oral tablet: 1 tab(s) orally every 6 hours, As Needed -Severe Pain (7 - 10) MDD:4 tablets  polyethylene glycol 3350 oral powder for reconstitution: 17 gram(s) orally once a day, As needed, Constipation  potassium chloride 10 mEq oral tablet, extended release: 1 tab(s) orally once a day   rosuvastatin 40 mg oral tablet: 1 tab(s) orally once a day

## 2019-11-10 NOTE — DISCHARGE NOTE PROVIDER - CARE PROVIDERS DIRECT ADDRESSES
[email or web address]

## 2019-11-10 NOTE — DISCHARGE NOTE PROVIDER - NSDCCPCAREPLAN_GEN_ALL_CORE_FT
PRINCIPAL DISCHARGE DIAGNOSIS  Diagnosis: CAD in native artery  Assessment and Plan of Treatment: PRINCIPAL DISCHARGE DIAGNOSIS  Diagnosis: CAD in native artery  Assessment and Plan of Treatment: It is very important that you continue your medications as prescribed after you are discharged.  You should refer to the medication reconciliation form provided to you on your discharge to information as to what medications to take and when.    Additionally, your medications have been sent to a pharmacy.  These medications should be picked up from your pharmacy on the DAY OF DISCHARGE.  This helps to ensure that you have the appropriate medications available while at home.  If you have any issues obtaining your medications on the day of your discharge, please call [phone] for further assistance.      SECONDARY DISCHARGE DIAGNOSES  Diagnosis: Surgical wound present  Assessment and Plan of Treatment: You may take a shower while at home.  You do have a tie down suture in place where your chest tube was in place.  This will be taken out at your outpatient follow-up appointment. Please keep this area clean and dry.

## 2019-11-10 NOTE — DISCHARGE NOTE PROVIDER - CARE PROVIDER_API CALL
Thomas Torres)  Cardiovascular Surgery  [address], [address] [place] [address]  Phone: [phone]  Fax: [phone]  Follow Up Time:     Hammad Lemus)  Cardiology; Interventional Cardiology  [address] [place] [address]  Phone: [phone]  Fax: [phone]  Follow Up Time:

## 2019-11-10 NOTE — DIETITIAN INITIAL EVALUATION ADULT. - OTHER INFO
59M with a PMH of psoriasis, Hypertension, Hyperlipidemia, obesity and known CAD s/p PTCA/RADHA LAD and D1 presented to cardiologist with CCS class II symptoms. Cardiac cath was performed revealing 3 vessel CAD. On 11/6 pt underwent OPCAB x 3 EF 45%. Operation was uncomplicated and pt arrived [facility] ext[facility]. His mediastinal and left pleural chest tube were placed to water seal on POD 2. On POD 3 CXR significant for small apical ptx and pleural tube was placed back on suction, mediastinal tube was clamped. Follow up afternoon CXR revealed re-expansion of the lung. Mediastinal tube removed at the bedside, f/u CXR with small left apical pnuemothorax. Today on POD 4, L pleural tube placed on waterseal. Upon exam pt sitting up in chair NAD notes fair intake, reports low sodium food options at home, using Mrs. TOLENTINO, feels food it too salty here. Discussed DASH/TLC diet ed w pt, receptive. Denies n/v/c/d, chewing/ swallowing issues or pain impacting intake, skin with surgical incision. NKFA, notes wt has been stable recently however possibly has lost ~5# since admission he feels d/t NPO status and sx. Will follow per protocol.

## 2019-11-11 LAB
ANION GAP SERPL CALC-SCNC: 13 MMOL/L — SIGNIFICANT CHANGE UP (ref 5–17)
BUN SERPL-MCNC: 12 MG/DL — SIGNIFICANT CHANGE UP (ref 7–23)
CALCIUM SERPL-MCNC: 9.3 MG/DL — SIGNIFICANT CHANGE UP (ref 8.4–10.5)
CHLORIDE SERPL-SCNC: 99 MMOL/L — SIGNIFICANT CHANGE UP (ref 96–108)
CO2 SERPL-SCNC: 24 MMOL/L — SIGNIFICANT CHANGE UP (ref 22–31)
CREAT SERPL-MCNC: 0.82 MG/DL — SIGNIFICANT CHANGE UP (ref 0.5–1.3)
GLUCOSE SERPL-MCNC: 97 MG/DL — SIGNIFICANT CHANGE UP (ref 70–99)
HCT VFR BLD CALC: 32.4 % — LOW (ref 39–50)
HGB BLD-MCNC: 10.8 G/DL — LOW (ref 13–17)
MAGNESIUM SERPL-MCNC: 2.1 MG/DL — SIGNIFICANT CHANGE UP (ref 1.6–2.6)
MCHC RBC-ENTMCNC: 29.6 PG — SIGNIFICANT CHANGE UP (ref 27–34)
MCHC RBC-ENTMCNC: 33.3 GM/DL — SIGNIFICANT CHANGE UP (ref 32–36)
MCV RBC AUTO: 88.8 FL — SIGNIFICANT CHANGE UP (ref 80–100)
NRBC # BLD: 0 /100 WBCS — SIGNIFICANT CHANGE UP (ref 0–0)
PLATELET # BLD AUTO: 307 K/UL — SIGNIFICANT CHANGE UP (ref 150–400)
POTASSIUM SERPL-MCNC: 4 MMOL/L — SIGNIFICANT CHANGE UP (ref 3.5–5.3)
POTASSIUM SERPL-SCNC: 4 MMOL/L — SIGNIFICANT CHANGE UP (ref 3.5–5.3)
RBC # BLD: 3.65 M/UL — LOW (ref 4.2–5.8)
RBC # FLD: 13.4 % — SIGNIFICANT CHANGE UP (ref 10.3–14.5)
SODIUM SERPL-SCNC: 136 MMOL/L — SIGNIFICANT CHANGE UP (ref 135–145)
WBC # BLD: 14.06 K/UL — HIGH (ref 3.8–10.5)
WBC # FLD AUTO: 14.06 K/UL — HIGH (ref 3.8–10.5)

## 2019-11-11 PROCEDURE — 71045 X-RAY EXAM CHEST 1 VIEW: CPT | Mod: 26

## 2019-11-11 RX ORDER — HEPARIN SODIUM 5000 [USP'U]/ML
7500 INJECTION INTRAVENOUS; SUBCUTANEOUS EVERY 8 HOURS
Refills: 0 | Status: DISCONTINUED | OUTPATIENT
Start: 2019-11-11 | End: 2019-11-12

## 2019-11-11 RX ADMIN — SODIUM CHLORIDE 3 MILLILITER(S): 9 INJECTION INTRAMUSCULAR; INTRAVENOUS; SUBCUTANEOUS at 05:54

## 2019-11-11 RX ADMIN — OXYCODONE AND ACETAMINOPHEN 1 TABLET(S): 5; 325 TABLET ORAL at 00:31

## 2019-11-11 RX ADMIN — Medication 50 MILLIGRAM(S): at 14:51

## 2019-11-11 RX ADMIN — ATORVASTATIN CALCIUM 40 MILLIGRAM(S): 80 TABLET, FILM COATED ORAL at 22:18

## 2019-11-11 RX ADMIN — HEPARIN SODIUM 7500 UNIT(S): 5000 INJECTION INTRAVENOUS; SUBCUTANEOUS at 22:19

## 2019-11-11 RX ADMIN — Medication 30 MILLIGRAM(S): at 11:37

## 2019-11-11 RX ADMIN — FAMOTIDINE 20 MILLIGRAM(S): 10 INJECTION INTRAVENOUS at 05:50

## 2019-11-11 RX ADMIN — Medication 50 MILLIGRAM(S): at 05:50

## 2019-11-11 RX ADMIN — SODIUM CHLORIDE 3 MILLILITER(S): 9 INJECTION INTRAMUSCULAR; INTRAVENOUS; SUBCUTANEOUS at 14:28

## 2019-11-11 RX ADMIN — SODIUM CHLORIDE 3 MILLILITER(S): 9 INJECTION INTRAMUSCULAR; INTRAVENOUS; SUBCUTANEOUS at 21:26

## 2019-11-11 RX ADMIN — CLOPIDOGREL BISULFATE 75 MILLIGRAM(S): 75 TABLET, FILM COATED ORAL at 11:37

## 2019-11-11 RX ADMIN — FAMOTIDINE 20 MILLIGRAM(S): 10 INJECTION INTRAVENOUS at 18:42

## 2019-11-11 RX ADMIN — Medication 50 MILLIGRAM(S): at 22:18

## 2019-11-11 RX ADMIN — Medication 30 MILLIGRAM(S): at 05:50

## 2019-11-11 RX ADMIN — HEPARIN SODIUM 7500 UNIT(S): 5000 INJECTION INTRAVENOUS; SUBCUTANEOUS at 14:51

## 2019-11-11 RX ADMIN — HEPARIN SODIUM 5000 UNIT(S): 5000 INJECTION INTRAVENOUS; SUBCUTANEOUS at 05:50

## 2019-11-11 RX ADMIN — Medication 81 MILLIGRAM(S): at 11:37

## 2019-11-11 RX ADMIN — OXYCODONE AND ACETAMINOPHEN 1 TABLET(S): 5; 325 TABLET ORAL at 01:30

## 2019-11-11 RX ADMIN — Medication 30 MILLIGRAM(S): at 18:42

## 2019-11-11 RX ADMIN — SENNA PLUS 2 TABLET(S): 8.6 TABLET ORAL at 22:18

## 2019-11-11 NOTE — PROGRESS NOTE ADULT - ASSESSMENT
This is a 58 y/o M with a PMHx of HTN, HLD, CAD, obesity, cholecystectomy and psoriasis. He presented to his outpatient cardiologist (Dr. Cayden Lemus) complaining of ELIZABETH. ECHO showed EF 54%. Cardiac cath showed 3-vessel CAD. Pt underwent OPCABx3 with Dr. Torres on 11/6/2019 with an uncomplicated OR course. In the ICU on POD 0, the patient arrived extubated. On POD 1, the patient was noted to have an air leak of his L pleural CT. On POD 2, the CT was set to waterseal and the pt was transferred to 9L. POD 4 the pleural CT remained on water seal with f/u CXR. Today, POD 5 pt to have CT remain on suction until Dr. Pearson evaluates it. Patient otherwise has been stable.     Neurovascular: No delirium. Pain well controlled with current regimen.  -PRN's: lidocaine patch, acetaminophen 650mg Q6 hours (for mild pain), Percocet Q4 hours 5/325 (for severe pain)     Cardiovascular: POD 5 from OPCABx3 (EF 45%)  -CAD: ASA 81mg, clopidogrel 75mg, diltiazem 30mg Q6, metoprolol tartrate 50mg, atorvastatin 40mg     Respiratory:   -Encourage use of IS 10x / hr while awake.  -CXR: no obvious PTX or effusion. Pending official read, f/u with results.     GI: Stable.  -PPX: Famotidine 20mg BID   -PO Diet: DASH  Constipation Miralax, senna     Renal / :  -Monitor renal function: BUN/Cr 12/0.82  -Monitor I/O's.    Endocrine:  no hx of DM or thyroid dx   -A1c: 5.4  -TSH: 1.34    Hematologic:  -CBC: H/H 10.8/32.4    ID:  -Temperature: afebrile  -CBC: WBC- 14.06   -Observe for SIRS/Sepsis Syndrome.    Prophylaxis:  -DVT prophylaxis with 7500 SubQ Heparin q8h. (BMI > 40)  -SCD's    Disposition:  -Patient will be d/c home once medically ready.

## 2019-11-11 NOTE — PROGRESS NOTE ADULT - SUBJECTIVE AND OBJECTIVE BOX
Patient discussed on morning rounds with Dr. Torres    Operation / Date:  11/6/2019 OPCABx3 EF 45%    SUBJECTIVE ASSESSMENT:  Pt states he is feeling well this morning and has been ambulating around the unit 8 times or so a day. He is using his IS frequently and can reach about 1500cc. Pt admits to having daily bowel movements. Denies CP, SOB, abdominal pain, SOB, N/V/D/C, fevers or chills at this time.     Vital Signs Last 24 Hrs  T(C): 37 (11 Nov 2019 09:04), Max: 37.3 (10 Nov 2019 21:52)  T(F): 98.6 (11 Nov 2019 09:04), Max: 99.1 (10 Nov 2019 21:52)  HR: 86 (11 Nov 2019 11:35) (82 - 94)  BP: 115/64 (11 Nov 2019 11:35) (115/64 - 161/93)  BP(mean): 88 (11 Nov 2019 11:35) (88 - 111)  RR: 18 (11 Nov 2019 09:50) (16 - 18)  SpO2: 95% (11 Nov 2019 09:50) (94% - 97%)  I&O's Detail    10 Nov 2019 07:01  -  11 Nov 2019 07:00  --------------------------------------------------------  IN:    Oral Fluid: 340 mL  Total IN: 340 mL    OUT:    Chest Tube: 150 mL    Voided: 1700 mL  Total OUT: 1850 mL    Total NET: -1510 mL      11 Nov 2019 07:01  -  11 Nov 2019 12:29  --------------------------------------------------------  IN:    Oral Fluid: 680 mL  Total IN: 680 mL    OUT:    Chest Tube: 40 mL    Voided: 350 mL  Total OUT: 390 mL    Total NET: 290 mL    CHEST TUBE:  Yes. AIR LEAKS: None observed (DR. SRAVANI VAUGHANAL BEFORE CLAMP TRIAL) - pt failed the last clamp trial   YI DRAIN:  No.  EPICARDIAL WIRES: Yes.  TIE DOWNS: No.  IVORY: No.    PHYSICAL EXAM:    General: well appearing, sitting up in chair at the bedside in NAD.     Neurological: AOx3. Motor skills grossly in-tact    Cardiovascular: Normal S1/S2. RRR. No M/R/G. V wires in place.     Respiratory: Lungs CTA b/l. No W/R/R. L pleural CT in place (on suction- keep until Dr. Michel ni)     Gastrointestinal: +BS in 4 quadrants. Soft. Non-tender. Non-distended.     Extremities: Strength 5/5 b/l. No edema. No calf tenderness.     Vascular: Radial 2+ in right arm. Good capillary refill in left arm. 2+ DP pulses b/l.     Incision Sites: Sternotomy without erythema, purulence, or ecchymosis.     LABS:                        10.8   14.06 )-----------( 307      ( 11 Nov 2019 05:54 )             32.4       COUMADIN:  No.        11-11    136  |  99  |  12  ----------------------------<  97  4.0   |  24  |  0.82    Ca    9.3      11 Nov 2019 05:54  Mg     2.1     11-11      MEDICATIONS  (STANDING):  aspirin enteric coated 81 milliGRAM(s) Oral daily  atorvastatin 40 milliGRAM(s) Oral at bedtime  clopidogrel Tablet 75 milliGRAM(s) Oral daily  diltiazem    Tablet 30 milliGRAM(s) Oral every 6 hours  famotidine    Tablet 20 milliGRAM(s) Oral two times a day  heparin  Injectable 5000 Unit(s) SubCutaneous every 8 hours  lidocaine   Patch 1 Patch Transdermal every 24 hours  metoprolol tartrate 50 milliGRAM(s) Oral <User Schedule>  senna 2 Tablet(s) Oral at bedtime  sodium chloride 0.9% lock flush 3 milliLiter(s) IV Push every 8 hours    MEDICATIONS  (PRN):  acetaminophen   Tablet .. 650 milliGRAM(s) Oral every 6 hours PRN Mild Pain (1 - 3)  oxycodone    5 mG/acetaminophen 325 mG 1 Tablet(s) Oral every 4 hours PRN Severe Pain (7 - 10)  polyethylene glycol 3350 17 Gram(s) Oral daily PRN Constipation        RADIOLOGY & ADDITIONAL TESTS:    CXR: 11/11/2019 - no official read yet, f/u with results  no obvious PTX or effusion appreciate. L CT in place.     CXR: 11/10/2019  < from: Xray Chest 1 View-PORTABLE IMMEDIATE (11.10.19 @ 07:11) >  INTERPRETATION:  Clinical History: Postop    Portable examination of the chest demonstrates patient to be status post   sternotomy. Left chest tube noted. Cardiomegaly. Discoid changes left   lung base. Improvement left effusion in comparison to prior examination   of the chest 11/9/2019.    Impression: Improvement left effusion. Discoid change left lung base    < end of copied text > Patient discussed on morning rounds with Dr. Torres    Operation / Date:  11/6/2019 OPCABx3 EF 45%    SUBJECTIVE ASSESSMENT:  Pt states he is feeling well this morning and has been ambulating around the unit 8 times or so a day. He is using his IS frequently and can reach about 1500cc. Pt admits to having daily bowel movements. Denies CP, SOB, abdominal pain, SOB, N/V/D/C, fevers or chills at this time.     Vital Signs Last 24 Hrs  T(C): 37 (11 Nov 2019 09:04), Max: 37.3 (10 Nov 2019 21:52)  T(F): 98.6 (11 Nov 2019 09:04), Max: 99.1 (10 Nov 2019 21:52)  HR: 86 (11 Nov 2019 11:35) (82 - 94)  BP: 115/64 (11 Nov 2019 11:35) (115/64 - 161/93)  BP(mean): 88 (11 Nov 2019 11:35) (88 - 111)  RR: 18 (11 Nov 2019 09:50) (16 - 18)  SpO2: 95% (11 Nov 2019 09:50) (94% - 97%)  I&O's Detail    10 Nov 2019 07:01  -  11 Nov 2019 07:00  --------------------------------------------------------  IN:    Oral Fluid: 340 mL  Total IN: 340 mL    OUT:    Chest Tube: 150 mL    Voided: 1700 mL  Total OUT: 1850 mL    Total NET: -1510 mL      11 Nov 2019 07:01  -  11 Nov 2019 12:29  --------------------------------------------------------  IN:    Oral Fluid: 680 mL  Total IN: 680 mL    OUT:    Chest Tube: 40 mL    Voided: 350 mL  Total OUT: 390 mL    Total NET: 290 mL    CHEST TUBE:  Yes. AIR LEAKS: None observed (DR. SRAVANI VAUGHANAL BEFORE CLAMP TRIAL) - pt failed the last clamp trial   YI DRAIN:  No.  EPICARDIAL WIRES: Yes.  TIE DOWNS: No.  IVORY: No.    PHYSICAL EXAM:    General: well appearing, sitting up in chair at the bedside in NAD.     Neurological: AOx3. Motor skills grossly in-tact    Cardiovascular: Normal S1/S2. RRR. No M/R/G. V wires in place.     Respiratory: Lungs CTA b/l. No W/R/R. L pleural CT in place (on suction- keep until Dr. Michel ni)     Gastrointestinal: +BS in 4 quadrants. Soft. Non-tender. Non-distended.     Extremities: Strength 5/5 b/l. No edema. No calf tenderness.     Vascular: Radial 2+ in right arm. Good capillary refill in left arm. 2+ DP pulses b/l.     Incision Sites: Sternotomy without erythema, purulence, or ecchymosis. CT site without erythema, purulence, or ecchymosis- dressing changed and is C/D/I.     LABS:                        10.8   14.06 )-----------( 307      ( 11 Nov 2019 05:54 )             32.4       COUMADIN:  No.        11-11    136  |  99  |  12  ----------------------------<  97  4.0   |  24  |  0.82    Ca    9.3      11 Nov 2019 05:54  Mg     2.1     11-11      MEDICATIONS  (STANDING):  aspirin enteric coated 81 milliGRAM(s) Oral daily  atorvastatin 40 milliGRAM(s) Oral at bedtime  clopidogrel Tablet 75 milliGRAM(s) Oral daily  diltiazem    Tablet 30 milliGRAM(s) Oral every 6 hours  famotidine    Tablet 20 milliGRAM(s) Oral two times a day  heparin  Injectable 5000 Unit(s) SubCutaneous every 8 hours  lidocaine   Patch 1 Patch Transdermal every 24 hours  metoprolol tartrate 50 milliGRAM(s) Oral <User Schedule>  senna 2 Tablet(s) Oral at bedtime  sodium chloride 0.9% lock flush 3 milliLiter(s) IV Push every 8 hours    MEDICATIONS  (PRN):  acetaminophen   Tablet .. 650 milliGRAM(s) Oral every 6 hours PRN Mild Pain (1 - 3)  oxycodone    5 mG/acetaminophen 325 mG 1 Tablet(s) Oral every 4 hours PRN Severe Pain (7 - 10)  polyethylene glycol 3350 17 Gram(s) Oral daily PRN Constipation        RADIOLOGY & ADDITIONAL TESTS:    CXR: 11/11/2019 - no official read yet, f/u with results  no obvious PTX or effusion appreciate. L CT in place.     CXR: 11/10/2019  < from: Xray Chest 1 View-PORTABLE IMMEDIATE (11.10.19 @ 07:11) >  INTERPRETATION:  Clinical History: Postop    Portable examination of the chest demonstrates patient to be status post   sternotomy. Left chest tube noted. Cardiomegaly. Discoid changes left   lung base. Improvement left effusion in comparison to prior examination   of the chest 11/9/2019.    Impression: Improvement left effusion. Discoid change left lung base    < end of copied text >

## 2019-11-12 ENCOUNTER — TRANSCRIPTION ENCOUNTER (OUTPATIENT)
Age: 59
End: 2019-11-12

## 2019-11-12 VITALS
OXYGEN SATURATION: 98 % | RESPIRATION RATE: 20 BRPM | HEART RATE: 88 BPM | SYSTOLIC BLOOD PRESSURE: 137 MMHG | DIASTOLIC BLOOD PRESSURE: 83 MMHG

## 2019-11-12 PROBLEM — I25.10 ATHEROSCLEROTIC HEART DISEASE OF NATIVE CORONARY ARTERY WITHOUT ANGINA PECTORIS: Chronic | Status: ACTIVE | Noted: 2019-10-31

## 2019-11-12 PROBLEM — E78.5 HYPERLIPIDEMIA, UNSPECIFIED: Chronic | Status: ACTIVE | Noted: 2019-10-31

## 2019-11-12 PROBLEM — E66.9 OBESITY, UNSPECIFIED: Chronic | Status: ACTIVE | Noted: 2019-10-31

## 2019-11-12 PROBLEM — I10 ESSENTIAL (PRIMARY) HYPERTENSION: Chronic | Status: ACTIVE | Noted: 2019-10-31

## 2019-11-12 PROBLEM — L40.9 PSORIASIS, UNSPECIFIED: Chronic | Status: ACTIVE | Noted: 2019-10-31

## 2019-11-12 PROBLEM — Z95.5 PRESENCE OF CORONARY ANGIOPLASTY IMPLANT AND GRAFT: Chronic | Status: ACTIVE | Noted: 2019-10-31

## 2019-11-12 PROBLEM — Z00.6 RESEARCH STUDY PATIENT: Status: ACTIVE | Noted: 2019-11-12

## 2019-11-12 LAB
ANION GAP SERPL CALC-SCNC: 12 MMOL/L — SIGNIFICANT CHANGE UP (ref 5–17)
BUN SERPL-MCNC: 11 MG/DL — SIGNIFICANT CHANGE UP (ref 7–23)
CALCIUM SERPL-MCNC: 9 MG/DL — SIGNIFICANT CHANGE UP (ref 8.4–10.5)
CHLORIDE SERPL-SCNC: 103 MMOL/L — SIGNIFICANT CHANGE UP (ref 96–108)
CO2 SERPL-SCNC: 23 MMOL/L — SIGNIFICANT CHANGE UP (ref 22–31)
CREAT SERPL-MCNC: 0.79 MG/DL — SIGNIFICANT CHANGE UP (ref 0.5–1.3)
GLUCOSE SERPL-MCNC: 103 MG/DL — HIGH (ref 70–99)
HCT VFR BLD CALC: 33.7 % — LOW (ref 39–50)
HGB BLD-MCNC: 11 G/DL — LOW (ref 13–17)
MAGNESIUM SERPL-MCNC: 2.1 MG/DL — SIGNIFICANT CHANGE UP (ref 1.6–2.6)
MCHC RBC-ENTMCNC: 29.4 PG — SIGNIFICANT CHANGE UP (ref 27–34)
MCHC RBC-ENTMCNC: 32.6 GM/DL — SIGNIFICANT CHANGE UP (ref 32–36)
MCV RBC AUTO: 90.1 FL — SIGNIFICANT CHANGE UP (ref 80–100)
NRBC # BLD: 0 /100 WBCS — SIGNIFICANT CHANGE UP (ref 0–0)
PLATELET # BLD AUTO: 336 K/UL — SIGNIFICANT CHANGE UP (ref 150–400)
POTASSIUM SERPL-MCNC: 3.8 MMOL/L — SIGNIFICANT CHANGE UP (ref 3.5–5.3)
POTASSIUM SERPL-SCNC: 3.8 MMOL/L — SIGNIFICANT CHANGE UP (ref 3.5–5.3)
RBC # BLD: 3.74 M/UL — LOW (ref 4.2–5.8)
RBC # FLD: 13.5 % — SIGNIFICANT CHANGE UP (ref 10.3–14.5)
SODIUM SERPL-SCNC: 138 MMOL/L — SIGNIFICANT CHANGE UP (ref 135–145)
WBC # BLD: 13.65 K/UL — HIGH (ref 3.8–10.5)
WBC # FLD AUTO: 13.65 K/UL — HIGH (ref 3.8–10.5)

## 2019-11-12 PROCEDURE — 71045 X-RAY EXAM CHEST 1 VIEW: CPT | Mod: 26

## 2019-11-12 RX ORDER — AMLODIPINE BESYLATE 2.5 MG/1
1 TABLET ORAL
Qty: 0 | Refills: 0 | DISCHARGE

## 2019-11-12 RX ORDER — DILTIAZEM HCL 120 MG
1 CAPSULE, EXT RELEASE 24 HR ORAL
Qty: 30 | Refills: 0
Start: 2019-11-12 | End: 2019-12-11

## 2019-11-12 RX ORDER — LISINOPRIL/HYDROCHLOROTHIAZIDE 10-12.5 MG
1 TABLET ORAL
Qty: 0 | Refills: 0 | DISCHARGE

## 2019-11-12 RX ORDER — POTASSIUM CHLORIDE 20 MEQ
20 PACKET (EA) ORAL ONCE
Refills: 0 | Status: COMPLETED | OUTPATIENT
Start: 2019-11-12 | End: 2019-11-12

## 2019-11-12 RX ORDER — ACETAMINOPHEN 500 MG
1 TABLET ORAL
Qty: 120 | Refills: 0
Start: 2019-11-12 | End: 2019-12-11

## 2019-11-12 RX ORDER — FUROSEMIDE 40 MG
1 TABLET ORAL
Qty: 7 | Refills: 0
Start: 2019-11-12 | End: 2019-11-18

## 2019-11-12 RX ORDER — ASPIRIN/CALCIUM CARB/MAGNESIUM 324 MG
1 TABLET ORAL
Qty: 30 | Refills: 0
Start: 2019-11-12 | End: 2019-12-11

## 2019-11-12 RX ORDER — CLOPIDOGREL BISULFATE 75 MG/1
1 TABLET, FILM COATED ORAL
Qty: 30 | Refills: 0
Start: 2019-11-12 | End: 2019-12-11

## 2019-11-12 RX ORDER — ASPIRIN/CALCIUM CARB/MAGNESIUM 324 MG
1 TABLET ORAL
Qty: 0 | Refills: 0 | DISCHARGE

## 2019-11-12 RX ORDER — ROSUVASTATIN CALCIUM 5 MG/1
1 TABLET ORAL
Qty: 30 | Refills: 0
Start: 2019-11-12 | End: 2019-12-11

## 2019-11-12 RX ORDER — POTASSIUM CHLORIDE 20 MEQ
1 PACKET (EA) ORAL
Qty: 7 | Refills: 0
Start: 2019-11-12 | End: 2019-11-18

## 2019-11-12 RX ORDER — ROSUVASTATIN CALCIUM 5 MG/1
1 TABLET ORAL
Qty: 0 | Refills: 0 | DISCHARGE

## 2019-11-12 RX ORDER — METOPROLOL TARTRATE 50 MG
1 TABLET ORAL
Qty: 0 | Refills: 0 | DISCHARGE

## 2019-11-12 RX ORDER — FAMOTIDINE 10 MG/ML
1 INJECTION INTRAVENOUS
Qty: 60 | Refills: 0
Start: 2019-11-12 | End: 2019-12-11

## 2019-11-12 RX ORDER — METOPROLOL TARTRATE 50 MG
1 TABLET ORAL
Qty: 60 | Refills: 0
Start: 2019-11-12 | End: 2019-12-11

## 2019-11-12 RX ORDER — POLYETHYLENE GLYCOL 3350 17 G/17G
17 POWDER, FOR SOLUTION ORAL
Qty: 1 | Refills: 0
Start: 2019-11-12 | End: 2019-11-25

## 2019-11-12 RX ADMIN — CLOPIDOGREL BISULFATE 75 MILLIGRAM(S): 75 TABLET, FILM COATED ORAL at 12:15

## 2019-11-12 RX ADMIN — HEPARIN SODIUM 7500 UNIT(S): 5000 INJECTION INTRAVENOUS; SUBCUTANEOUS at 05:32

## 2019-11-12 RX ADMIN — Medication 50 MILLIGRAM(S): at 05:32

## 2019-11-12 RX ADMIN — Medication 81 MILLIGRAM(S): at 12:15

## 2019-11-12 RX ADMIN — FAMOTIDINE 20 MILLIGRAM(S): 10 INJECTION INTRAVENOUS at 05:32

## 2019-11-12 RX ADMIN — Medication 30 MILLIGRAM(S): at 05:32

## 2019-11-12 RX ADMIN — Medication 20 MILLIEQUIVALENT(S): at 12:15

## 2019-11-12 RX ADMIN — Medication 30 MILLIGRAM(S): at 00:36

## 2019-11-12 RX ADMIN — Medication 30 MILLIGRAM(S): at 12:15

## 2019-11-12 NOTE — DISCHARGE NOTE NURSING/CASE MANAGEMENT/SOCIAL WORK - PATIENT PORTAL LINK FT
You can access the FollowMyHealth Patient Portal offered by [facility] by registering at the following website: [email or web address]. By joining [employer]’s FollowMyHealth portal, you will also be able to view your health information using other applications (apps) compatible with our system.

## 2019-11-12 NOTE — CHART NOTE - NSCHARTNOTEFT_GEN_A_CORE
11/12/2019    CT Removal:   Pt seen and examined at bedside.  Case discussed with Dr. Torres.  Minimal output from CTs.  No air leak appreciated.  CT removed without incident per Dr. Torres request.  Occlusive DSD placed.  CXR showing stable PTX- unchanged since CT removal.  Pt tolerated procedure well.      V. Pacing Wire Removal:   Pt seen and examined at bedside. Case was discussed with Dr. Torres. Patient's intrinsic heart rhythm without wires is stable. Wires cleaned with chlorhexidine and cut. CXR was stable afterwards. Pt tolerated the procedure well.

## 2019-11-12 NOTE — PROGRESS NOTE ADULT - SUBJECTIVE AND OBJECTIVE BOX
Patient discussed on morning rounds with Dr. Torres      Operation / Date: 11/6/2019 - OPCABx3 (EF45%)    SUBJECTIVE ASSESSMENT:  Patient states he is feeling great this morning and ready to go home. Has been ambulating without difficulty and using his IS regularly. Has been having daily bowel movements. Denies any CP, palpitations, SOB, abdominal pain, N/V/D, fevers, or chills.     Vital Signs Last 24 Hrs  T(C): 37.1 (12 Nov 2019 09:07), Max: 37.6 (11 Nov 2019 22:21)  T(F): 98.8 (12 Nov 2019 09:07), Max: 99.7 (11 Nov 2019 22:21)  HR: 72 (12 Nov 2019 05:00) (72 - 88)  BP: 119/78 (12 Nov 2019 05:00) (105/64 - 140/76)  BP(mean): 91 (12 Nov 2019 05:00) (80 - 97)  RR: 18 (11 Nov 2019 22:17) (18 - 22)  SpO2: 96% (12 Nov 2019 05:00) (94% - 96%)  I&O's Detail    11 Nov 2019 07:01  -  12 Nov 2019 07:00  --------------------------------------------------------  IN:    Oral Fluid: 2380 mL  Total IN: 2380 mL    OUT:    Chest Tube: 90 mL    Voided: 3250 mL  Total OUT: 3340 mL    Total NET: -960 mL    CHEST TUBE:  No.  WOUND VAC: yes - sternotomy   YI DRAIN:  No.   EPICARDIAL WIRES: Yes/No.  TIE DOWNS: Yes/No.  IVORY: Yes/No.    PHYSICAL EXAM:    General:     Neurological:    Cardiovascular:    Respiratory:    Gastrointestinal:    Extremities:    Vascular:    Incision Sites:    LABS:                        11.0   13.65 )-----------( 336      ( 12 Nov 2019 05:55 )             33.7       COUMADIN:  Yes/No. REASON: .        11-12    138  |  103  |  11  ----------------------------<  103<H>  3.8   |  23  |  0.79    Ca    9.0      12 Nov 2019 05:55  Mg     2.1     11-12            MEDICATIONS  (STANDING):  aspirin enteric coated 81 milliGRAM(s) Oral daily  atorvastatin 40 milliGRAM(s) Oral at bedtime  clopidogrel Tablet 75 milliGRAM(s) Oral daily  diltiazem    Tablet 30 milliGRAM(s) Oral every 6 hours  famotidine    Tablet 20 milliGRAM(s) Oral two times a day  heparin  Injectable 7500 Unit(s) SubCutaneous every 8 hours  lidocaine   Patch 1 Patch Transdermal every 24 hours  metoprolol tartrate 50 milliGRAM(s) Oral <User Schedule>  senna 2 Tablet(s) Oral at bedtime  sodium chloride 0.9% lock flush 3 milliLiter(s) IV Push every 8 hours    MEDICATIONS  (PRN):  acetaminophen   Tablet .. 650 milliGRAM(s) Oral every 6 hours PRN Mild Pain (1 - 3)  oxycodone    5 mG/acetaminophen 325 mG 1 Tablet(s) Oral every 4 hours PRN Severe Pain (7 - 10)  polyethylene glycol 3350 17 Gram(s) Oral daily PRN Constipation        RADIOLOGY & ADDITIONAL TESTS: Patient discussed on morning rounds with Dr. Torres      Referring Physician: Dr. Hammad Lemus    Operation / Date: 11/6/2019 - OPCABx3 (EF45%)    SUBJECTIVE ASSESSMENT:  Patient states he is feeling great this morning and ready to go home. Has been ambulating without difficulty and using his IS regularly. Has been having daily bowel movements. Denies any CP, palpitations, SOB, abdominal pain, N/V/D, fevers, or chills.     HOSPITAL COURSE:   This is a 58 y/o M with a PMHx of Hypertension, Hyperlipidemia, obesity, psorasis, cholecystectomy and known CAD s/p PTCA/RADHA LAD & D1 presented to cardiologist with CCS class II symptoms. Cardiac cath was performed revealing 3-vessel CAD. On 11/6 pt underwent OPCAB x 3 EF 45%. OR course was uncomplicated and [facility] ext[facility]. He was noted to have an airleak in his CT on POD 1. His mediastinal and left pleural chest tube were placed to water seal on POD 2. Follow up CXR on POD 3 significant for small apical ptx and pleural tube was placed back on suction, mediastinal tube was clamped. Follow up afternoon CXR revealed re-expansion of the lung. Mediastinal tube removed at the bedside, f/u CXR with small left apical pnuemothorax. On POD 4 the L pleural CT was placed on waterseal with a f/u CXR showing small left apical pnuemothorax again. On POD 5, the CT remained on suction throughout the day and through the night. Today, on POD 6, CT removed after AM CXR was stable. In addition, CT tie downs and V-wires were removed. Per Dr. Torres, patient is medically ready to be discharged home.     Vital Signs Last 24 Hrs  T(C): 37.1 (12 Nov 2019 09:07), Max: 37.6 (11 Nov 2019 22:21)  T(F): 98.8 (12 Nov 2019 09:07), Max: 99.7 (11 Nov 2019 22:21)  HR: 72 (12 Nov 2019 05:00) (72 - 88)  BP: 119/78 (12 Nov 2019 05:00) (105/64 - 140/76)  BP(mean): 91 (12 Nov 2019 05:00) (80 - 97)  RR: 18 (11 Nov 2019 22:17) (18 - 22)  SpO2: 96% (12 Nov 2019 05:00) (94% - 96%)  I&O's Detail    11 Nov 2019 07:01  -  12 Nov 2019 07:00  --------------------------------------------------------  IN:    Oral Fluid: 2380 mL  Total IN: 2380 mL    OUT:    Chest Tube: 90 mL    Voided: 3250 mL  Total OUT: 3340 mL    Total NET: -960 mL    CHEST TUBE:  No.  WOUND VAC: No.   YI DRAIN:  No.   EPICARDIAL WIRES: No. (removed today on 11/12/2019)  TIE DOWNS: Yes. (1 from CT pull today 11/12/2019)  IVORY: No.    PHYSICAL EXAM:    General: well appearing, sitting up in chair at the bedside in NAD.   Neurological: AOx3. Motor skills grossly in-tact  Cardiovascular: Normal S1/S2. RRR. No M/R/G. (V wires removed today 11/12/19)    Respiratory: Lungs CTA b/l. No W/R/R. (L pleural CT removed today 11/12/2019)  Gastrointestinal: +BS in 4 quadrants. Soft. Non-tender. Non-distended.   Extremities: Strength 5/5 b/l. No edema. No calf tenderness.   Vascular: Radial 2+ in right arm. Good capillary refill in left arm. 2+ DP pulses b/l.   Incision Sites: Sternotomy without erythema, purulence, or ecchymosis. 1 tie down in place from CT removal (to be removed at f/u appointment)     LABS:                        11.0   13.65 )-----------( 336      ( 12 Nov 2019 05:55 )             33.7       COUMADIN:  No.      11-12    138  |  103  |  11  ----------------------------<  103<H>  3.8   |  23  |  0.79    Ca    9.0      12 Nov 2019 05:55  Mg     2.1     11-12    MEDICATIONS  (STANDING): Inpatient   aspirin enteric coated 81 milliGRAM(s) Oral daily  atorvastatin 40 milliGRAM(s) Oral at bedtime  clopidogrel Tablet 75 milliGRAM(s) Oral daily  diltiazem    Tablet 30 milliGRAM(s) Oral every 6 hours  famotidine    Tablet 20 milliGRAM(s) Oral two times a day  heparin  Injectable 7500 Unit(s) SubCutaneous every 8 hours  lidocaine   Patch 1 Patch Transdermal every 24 hours  metoprolol tartrate 50 milliGRAM(s) Oral <User Schedule>  senna 2 Tablet(s) Oral at bedtime  sodium chloride 0.9% lock flush 3 milliLiter(s) IV Push every 8 hours    Over 35 minutes was spent with the patient reviewing the discharge material including medications, follow up appointments, recovery, concerning symptoms, and how to contact their health care providers if they have questions      MEDICATIONS  (PRN):  acetaminophen   Tablet .. 650 milliGRAM(s) Oral every 6 hours PRN Mild Pain (1 - 3)  oxycodone    5 mG/acetaminophen 325 mG 1 Tablet(s) Oral every 4 hours PRN Severe Pain (7 - 10)  polyethylene glycol 3350 17 Gram(s) Oral daily PRN Constipation      RADIOLOGY & ADDITIONAL TESTS:    < from: Xray Chest 1 View-PORTABLE IMMEDIATE (11.10.19 @ 07:11) >  INTERPRETATION:  Clinical History: Postop    Portable examination of the chest demonstrates patient to be status post   sternotomy. Left chest tube noted. Cardiomegaly. Discoid changes left   lung base. Improvement left effusion in comparison to prior examination   of the chest 11/9/2019.    Impression: Improvement left effusion. Discoid change left lung base    < end of copied text >

## 2019-11-12 NOTE — PROGRESS NOTE ADULT - ASSESSMENT
Discharge Instructions:    PRINCIPAL DISCHARGE DIAGNOSIS  Diagnosis: CAD in native artery  Assessment and Plan of Treatment: It is very important that you continue your medications as prescribed after you are discharged.  You should refer to the medication reconciliation form provided to you on your discharge to information as to what medications to take and when.    Additionally, your medications have been sent to a pharmacy.  These medications should be picked up from your pharmacy on the DAY OF DISCHARGE.  This helps to ensure that you have the appropriate medications available while at home.  If you have any issues obtaining your medications on the day of your discharge, please call [phone] for further assistance.      SECONDARY DISCHARGE DIAGNOSES  Diagnosis: Surgical wound present  Assessment and Plan of Treatment: You may take a shower while at home.  You do have a tie down suture in place where your chest tube was in place.  This will be taken out at your outpatient follow-up appointment. Please keep this area clean and dry.    Dr. Torres (CT Surgery): Dr. Torres's office will call you with an appointment.  If you have not heard back from them by Thursday, 11/14/19, please call them to confirm your appointment.   Dr. Lemus (Cardiology): Please call Dr. Lemus's office on Wednesday 11/13/19 to schedule your outpatient appointment.   Please follow-up with your primary care provider within 2-4 weeks after being discharged to discuss your hospital stay.	  	  No heavy lifting/straining, Showering allowed, Stairs allowed, Walking - Indoors allowed, Walking - Outdoors allowed	  -Walk daily as tolerated and use your incentive spirometer every hour.  -No driving or strenuous activity/exercise for 6 weeks, or until cleared by your surgeon.  -Gently clean your incisions with anti-bacterial soap and water, pat dry.  You may leave them open to air.  -Call your doctor if you have shortness of breath, chest pain not relieved by pain medication, dizziness, fever >101.5, or increased redness or drainage from incisions.

## 2019-11-12 NOTE — DISCHARGE NOTE NURSING/CASE MANAGEMENT/SOCIAL WORK - NSDCFUADDAPPT_GEN_ALL_CORE_FT
Dr. Torres (CT Surgery): Dr. Torres's office will call you with an appointment.  If you have not heard back from them by Thursday, 11/14/19, please call them to confirm your appointment.     Dr. Lemus (Cardiology): Please call Dr. Lemus's office on Wednesday 11/13/19 to schedule your outpatient appointment.     Please follow-up with your primary care provider within 2-4 weeks after being discharged to discuss your hospital stay.

## 2019-11-13 RX ORDER — AMLODIPINE BESYLATE 10 MG/1
10 TABLET ORAL DAILY
Refills: 0 | Status: COMPLETED | COMMUNITY
End: 2019-11-13

## 2019-11-13 RX ORDER — RANOLAZINE 500 MG/1
500 TABLET, FILM COATED, EXTENDED RELEASE ORAL AT BEDTIME
Refills: 0 | Status: COMPLETED | COMMUNITY
End: 2019-11-13

## 2019-11-13 RX ORDER — LISINOPRIL AND HYDROCHLOROTHIAZIDE TABLETS 10; 12.5 MG/1; MG/1
10-12.5 TABLET ORAL DAILY
Refills: 0 | Status: COMPLETED | COMMUNITY
End: 2019-11-13

## 2019-11-13 RX ORDER — METOPROLOL SUCCINATE 25 MG/1
25 TABLET, EXTENDED RELEASE ORAL DAILY
Refills: 3 | Status: COMPLETED | COMMUNITY
End: 2019-11-13

## 2019-11-14 ENCOUNTER — INPATIENT (INPATIENT)
Facility: HOSPITAL | Age: 59
LOS: 10 days | Discharge: HOME | DRG: 314 | End: 2019-11-25
Attending: THORACIC SURGERY (CARDIOTHORACIC VASCULAR SURGERY) | Admitting: THORACIC SURGERY (CARDIOTHORACIC VASCULAR SURGERY)
Payer: COMMERCIAL

## 2019-11-14 VITALS
DIASTOLIC BLOOD PRESSURE: 68 MMHG | SYSTOLIC BLOOD PRESSURE: 129 MMHG | HEART RATE: 80 BPM | RESPIRATION RATE: 34 BRPM | OXYGEN SATURATION: 91 %

## 2019-11-14 DIAGNOSIS — Z95.1 PRESENCE OF AORTOCORONARY BYPASS GRAFT: Chronic | ICD-10-CM

## 2019-11-14 DIAGNOSIS — Z90.49 ACQUIRED ABSENCE OF OTHER SPECIFIED PARTS OF DIGESTIVE TRACT: Chronic | ICD-10-CM

## 2019-11-14 DIAGNOSIS — Z98.890 OTHER SPECIFIED POSTPROCEDURAL STATES: Chronic | ICD-10-CM

## 2019-11-14 LAB
ALBUMIN SERPL ELPH-MCNC: 3.2 G/DL — LOW (ref 3.3–5)
ALP SERPL-CCNC: 146 U/L — HIGH (ref 40–120)
ALP SERPL-CCNC: 151 U/L — HIGH (ref 40–120)
ALP SERPL-CCNC: 155 U/L — HIGH (ref 40–120)
ALT FLD-CCNC: >7000 U/L — HIGH (ref 10–45)
ALT FLD-CCNC: >7000 U/L — HIGH (ref 10–45)
ALT FLD-CCNC: >7000 U/L — SIGNIFICANT CHANGE UP (ref 10–45)
ANION GAP SERPL CALC-SCNC: 17 MMOL/L — SIGNIFICANT CHANGE UP (ref 5–17)
ANION GAP SERPL CALC-SCNC: 17 MMOL/L — SIGNIFICANT CHANGE UP (ref 5–17)
ANION GAP SERPL CALC-SCNC: 19 MMOL/L — HIGH (ref 5–17)
ANION GAP SERPL CALC-SCNC: 20 MMOL/L — HIGH (ref 5–17)
APAP SERPL-MCNC: <5 UG/ML — LOW (ref 10–30)
APTT BLD: 35.9 SEC — SIGNIFICANT CHANGE UP (ref 27.5–36.3)
APTT BLD: 36.2 SEC — SIGNIFICANT CHANGE UP (ref 27.5–36.3)
AST SERPL-CCNC: >7000 U/L — HIGH (ref 10–40)
AST SERPL-CCNC: >7000 U/L — HIGH (ref 10–40)
AST SERPL-CCNC: >7000 U/L — SIGNIFICANT CHANGE UP (ref 10–40)
BASE EXCESS BLDA CALC-SCNC: -7.1 MMOL/L — LOW (ref -2–3)
BASOPHILS # BLD AUTO: 0 K/UL — SIGNIFICANT CHANGE UP (ref 0–0.2)
BASOPHILS NFR BLD AUTO: 0 % — SIGNIFICANT CHANGE UP (ref 0–2)
BILIRUB SERPL-MCNC: 2.6 MG/DL — HIGH (ref 0.2–1.2)
BILIRUB SERPL-MCNC: 2.8 MG/DL — HIGH (ref 0.2–1.2)
BILIRUB SERPL-MCNC: 3.2 MG/DL — HIGH (ref 0.2–1.2)
BLD GP AB SCN SERPL QL: NEGATIVE — SIGNIFICANT CHANGE UP
BLD GP AB SCN SERPL QL: NEGATIVE — SIGNIFICANT CHANGE UP
BUN SERPL-MCNC: 42 MG/DL — HIGH (ref 7–23)
BUN SERPL-MCNC: 42 MG/DL — HIGH (ref 7–23)
BUN SERPL-MCNC: 45 MG/DL — HIGH (ref 7–23)
BUN SERPL-MCNC: 49 MG/DL — HIGH (ref 7–23)
CALCIUM SERPL-MCNC: 7.9 MG/DL — LOW (ref 8.4–10.5)
CALCIUM SERPL-MCNC: 8 MG/DL — LOW (ref 8.4–10.5)
CALCIUM SERPL-MCNC: 8.2 MG/DL — LOW (ref 8.4–10.5)
CALCIUM SERPL-MCNC: 8.4 MG/DL — SIGNIFICANT CHANGE UP (ref 8.4–10.5)
CHLORIDE SERPL-SCNC: 96 MMOL/L — SIGNIFICANT CHANGE UP (ref 96–108)
CHLORIDE SERPL-SCNC: 97 MMOL/L — SIGNIFICANT CHANGE UP (ref 96–108)
CHLORIDE SERPL-SCNC: 98 MMOL/L — SIGNIFICANT CHANGE UP (ref 96–108)
CHLORIDE SERPL-SCNC: 99 MMOL/L — SIGNIFICANT CHANGE UP (ref 96–108)
CO2 SERPL-SCNC: 11 MMOL/L — LOW (ref 22–31)
CO2 SERPL-SCNC: 17 MMOL/L — LOW (ref 22–31)
CO2 SERPL-SCNC: 17 MMOL/L — LOW (ref 22–31)
CO2 SERPL-SCNC: 19 MMOL/L — LOW (ref 22–31)
CREAT SERPL-MCNC: 3.53 MG/DL — HIGH (ref 0.5–1.3)
CREAT SERPL-MCNC: 3.59 MG/DL — HIGH (ref 0.5–1.3)
CREAT SERPL-MCNC: 3.63 MG/DL — HIGH (ref 0.5–1.3)
CREAT SERPL-MCNC: 3.82 MG/DL — HIGH (ref 0.5–1.3)
EOSINOPHIL # BLD AUTO: 0 K/UL — SIGNIFICANT CHANGE UP (ref 0–0.5)
EOSINOPHIL NFR BLD AUTO: 0 % — SIGNIFICANT CHANGE UP (ref 0–6)
GAS PNL BLDA: SIGNIFICANT CHANGE UP
GLUCOSE SERPL-MCNC: 121 MG/DL — HIGH (ref 70–99)
GLUCOSE SERPL-MCNC: 128 MG/DL — HIGH (ref 70–99)
GLUCOSE SERPL-MCNC: 141 MG/DL — HIGH (ref 70–99)
GLUCOSE SERPL-MCNC: 154 MG/DL — HIGH (ref 70–99)
GRAM STN FLD: SIGNIFICANT CHANGE UP
HBA1C BLD-MCNC: 5.1 % — SIGNIFICANT CHANGE UP (ref 4–5.6)
HCO3 BLDA-SCNC: 16 MMOL/L — LOW (ref 21–28)
HCT VFR BLD CALC: 23.4 % — LOW (ref 39–50)
HCT VFR BLD CALC: 24.8 % — LOW (ref 39–50)
HCT VFR BLD CALC: 27.3 % — LOW (ref 39–50)
HGB BLD-MCNC: 7.7 G/DL — LOW (ref 13–17)
HGB BLD-MCNC: 8.2 G/DL — LOW (ref 13–17)
HGB BLD-MCNC: 8.9 G/DL — LOW (ref 13–17)
INR BLD: 2.44 — HIGH (ref 0.88–1.16)
INR BLD: 2.54 — HIGH (ref 0.88–1.16)
LACTATE SERPL-SCNC: 2.2 MMOL/L — HIGH (ref 0.5–2)
LACTATE SERPL-SCNC: 5.6 MMOL/L — CRITICAL HIGH (ref 0.5–2)
LACTATE SERPL-SCNC: 6.5 MMOL/L — CRITICAL HIGH (ref 0.5–2)
LACTATE SERPL-SCNC: 7.2 MMOL/L — CRITICAL HIGH (ref 0.5–2)
LYMPHOCYTES # BLD AUTO: 2.84 K/UL — SIGNIFICANT CHANGE UP (ref 1–3.3)
LYMPHOCYTES # BLD AUTO: 7 % — LOW (ref 13–44)
MAGNESIUM SERPL-MCNC: 2.3 MG/DL — SIGNIFICANT CHANGE UP (ref 1.6–2.6)
MCHC RBC-ENTMCNC: 29.7 PG — SIGNIFICANT CHANGE UP (ref 27–34)
MCHC RBC-ENTMCNC: 30 PG — SIGNIFICANT CHANGE UP (ref 27–34)
MCHC RBC-ENTMCNC: 30.2 PG — SIGNIFICANT CHANGE UP (ref 27–34)
MCHC RBC-ENTMCNC: 32.6 GM/DL — SIGNIFICANT CHANGE UP (ref 32–36)
MCHC RBC-ENTMCNC: 32.9 GM/DL — SIGNIFICANT CHANGE UP (ref 32–36)
MCHC RBC-ENTMCNC: 33.1 GM/DL — SIGNIFICANT CHANGE UP (ref 32–36)
MCV RBC AUTO: 90.3 FL — SIGNIFICANT CHANGE UP (ref 80–100)
MCV RBC AUTO: 90.8 FL — SIGNIFICANT CHANGE UP (ref 80–100)
MCV RBC AUTO: 92.5 FL — SIGNIFICANT CHANGE UP (ref 80–100)
MONOCYTES # BLD AUTO: 0 K/UL — SIGNIFICANT CHANGE UP (ref 0–0.9)
MONOCYTES NFR BLD AUTO: 0 % — LOW (ref 2–14)
NEUTROPHILS # BLD AUTO: 35.99 K/UL — HIGH (ref 1.8–7.4)
NEUTROPHILS NFR BLD AUTO: 87 % — HIGH (ref 43–77)
NRBC # BLD: 0 /100 WBCS — SIGNIFICANT CHANGE UP (ref 0–0)
NRBC # BLD: 0 /100 WBCS — SIGNIFICANT CHANGE UP (ref 0–0)
NT-PROBNP SERPL-SCNC: HIGH PG/ML (ref 0–300)
PCO2 BLDA: 26 MMHG — LOW (ref 35–48)
PH BLDA: 7.42 — SIGNIFICANT CHANGE UP (ref 7.35–7.45)
PHOSPHATE SERPL-MCNC: 7 MG/DL — HIGH (ref 2.5–4.5)
PLATELET # BLD AUTO: 218 K/UL — SIGNIFICANT CHANGE UP (ref 150–400)
PLATELET # BLD AUTO: 238 K/UL — SIGNIFICANT CHANGE UP (ref 150–400)
PLATELET # BLD AUTO: 287 K/UL — SIGNIFICANT CHANGE UP (ref 150–400)
PO2 BLDA: 93 MMHG — SIGNIFICANT CHANGE UP (ref 83–108)
POTASSIUM SERPL-MCNC: 5.9 MMOL/L — HIGH (ref 3.5–5.3)
POTASSIUM SERPL-MCNC: 6 MMOL/L — HIGH (ref 3.5–5.3)
POTASSIUM SERPL-MCNC: 6 MMOL/L — HIGH (ref 3.5–5.3)
POTASSIUM SERPL-MCNC: 6.1 MMOL/L — HIGH (ref 3.5–5.3)
POTASSIUM SERPL-SCNC: 5.9 MMOL/L — HIGH (ref 3.5–5.3)
POTASSIUM SERPL-SCNC: 6 MMOL/L — HIGH (ref 3.5–5.3)
POTASSIUM SERPL-SCNC: 6 MMOL/L — HIGH (ref 3.5–5.3)
POTASSIUM SERPL-SCNC: 6.1 MMOL/L — HIGH (ref 3.5–5.3)
PROT SERPL-MCNC: 5.8 G/DL — LOW (ref 6–8.3)
PROT SERPL-MCNC: 6 G/DL — SIGNIFICANT CHANGE UP (ref 6–8.3)
PROT SERPL-MCNC: 6.2 G/DL — SIGNIFICANT CHANGE UP (ref 6–8.3)
PROTHROM AB SERPL-ACNC: 28.4 SEC — HIGH (ref 10–12.9)
PROTHROM AB SERPL-ACNC: 29.5 SEC — HIGH (ref 10–12.9)
RBC # BLD: 2.59 M/UL — LOW (ref 4.2–5.8)
RBC # BLD: 2.73 M/UL — LOW (ref 4.2–5.8)
RBC # BLD: 2.95 M/UL — LOW (ref 4.2–5.8)
RBC # FLD: 13.8 % — SIGNIFICANT CHANGE UP (ref 10.3–14.5)
RBC # FLD: 14.1 % — SIGNIFICANT CHANGE UP (ref 10.3–14.5)
RBC # FLD: 14.2 % — SIGNIFICANT CHANGE UP (ref 10.3–14.5)
RH IG SCN BLD-IMP: POSITIVE — SIGNIFICANT CHANGE UP
RH IG SCN BLD-IMP: POSITIVE — SIGNIFICANT CHANGE UP
SAO2 % BLDA: 96 % — SIGNIFICANT CHANGE UP (ref 95–100)
SODIUM SERPL-SCNC: 130 MMOL/L — LOW (ref 135–145)
SODIUM SERPL-SCNC: 132 MMOL/L — LOW (ref 135–145)
SODIUM SERPL-SCNC: 132 MMOL/L — LOW (ref 135–145)
SODIUM SERPL-SCNC: 133 MMOL/L — LOW (ref 135–145)
SPECIMEN SOURCE: SIGNIFICANT CHANGE UP
TSH SERPL-MCNC: 3.7 UIU/ML — SIGNIFICANT CHANGE UP (ref 0.35–4.94)
WBC # BLD: 27.38 K/UL — HIGH (ref 3.8–10.5)
WBC # BLD: 35.12 K/UL — HIGH (ref 3.8–10.5)
WBC # BLD: 40.58 K/UL — CRITICAL HIGH (ref 3.8–10.5)
WBC # FLD AUTO: 27.38 K/UL — HIGH (ref 3.8–10.5)
WBC # FLD AUTO: 35.12 K/UL — HIGH (ref 3.8–10.5)
WBC # FLD AUTO: 40.58 K/UL — CRITICAL HIGH (ref 3.8–10.5)

## 2019-11-14 PROCEDURE — 99292 CRITICAL CARE ADDL 30 MIN: CPT

## 2019-11-14 PROCEDURE — 99233 SBSQ HOSP IP/OBS HIGH 50: CPT

## 2019-11-14 PROCEDURE — 99223 1ST HOSP IP/OBS HIGH 75: CPT

## 2019-11-14 PROCEDURE — 93321 DOPPLER ECHO F-UP/LMTD STD: CPT | Mod: 26

## 2019-11-14 PROCEDURE — 99291 CRITICAL CARE FIRST HOUR: CPT

## 2019-11-14 PROCEDURE — 93308 TTE F-UP OR LMTD: CPT | Mod: 26

## 2019-11-14 PROCEDURE — 71045 X-RAY EXAM CHEST 1 VIEW: CPT | Mod: 26

## 2019-11-14 PROCEDURE — 93010 ELECTROCARDIOGRAM REPORT: CPT

## 2019-11-14 RX ORDER — ASPIRIN/CALCIUM CARB/MAGNESIUM 324 MG
81 TABLET ORAL DAILY
Refills: 0 | Status: DISCONTINUED | OUTPATIENT
Start: 2019-11-14 | End: 2019-11-25

## 2019-11-14 RX ORDER — SODIUM CHLORIDE 9 MG/ML
1000 INJECTION INTRAMUSCULAR; INTRAVENOUS; SUBCUTANEOUS ONCE
Refills: 0 | Status: DISCONTINUED | OUTPATIENT
Start: 2019-11-14 | End: 2019-11-14

## 2019-11-14 RX ORDER — CLOPIDOGREL BISULFATE 75 MG/1
75 TABLET, FILM COATED ORAL DAILY
Refills: 0 | Status: DISCONTINUED | OUTPATIENT
Start: 2019-11-14 | End: 2019-11-25

## 2019-11-14 RX ORDER — CALCIUM GLUCONATE 100 MG/ML
2 VIAL (ML) INTRAVENOUS ONCE
Refills: 0 | Status: COMPLETED | OUTPATIENT
Start: 2019-11-14 | End: 2019-11-15

## 2019-11-14 RX ORDER — SODIUM POLYSTYRENE SULFONATE 4.1 MEQ/G
30 POWDER, FOR SUSPENSION ORAL ONCE
Refills: 0 | Status: COMPLETED | OUTPATIENT
Start: 2019-11-14 | End: 2019-11-14

## 2019-11-14 RX ORDER — HEPARIN SODIUM 5000 [USP'U]/ML
7500 INJECTION INTRAVENOUS; SUBCUTANEOUS EVERY 8 HOURS
Refills: 0 | Status: DISCONTINUED | OUTPATIENT
Start: 2019-11-14 | End: 2019-11-16

## 2019-11-14 RX ORDER — VASOPRESSIN 20 [USP'U]/ML
0.03 INJECTION INTRAVENOUS
Qty: 50 | Refills: 0 | Status: DISCONTINUED | OUTPATIENT
Start: 2019-11-14 | End: 2019-11-19

## 2019-11-14 RX ORDER — SODIUM BICARBONATE 1 MEQ/ML
50 SYRINGE (ML) INTRAVENOUS
Refills: 0 | Status: COMPLETED | OUTPATIENT
Start: 2019-11-14 | End: 2019-11-14

## 2019-11-14 RX ORDER — FUROSEMIDE 40 MG
10 TABLET ORAL
Qty: 500 | Refills: 0 | Status: DISCONTINUED | OUTPATIENT
Start: 2019-11-14 | End: 2019-11-15

## 2019-11-14 RX ORDER — FUROSEMIDE 40 MG
100 TABLET ORAL ONCE
Refills: 0 | Status: COMPLETED | OUTPATIENT
Start: 2019-11-14 | End: 2019-11-14

## 2019-11-14 RX ORDER — SODIUM CHLORIDE 9 MG/ML
1000 INJECTION INTRAMUSCULAR; INTRAVENOUS; SUBCUTANEOUS
Refills: 0 | Status: DISCONTINUED | OUTPATIENT
Start: 2019-11-14 | End: 2019-11-19

## 2019-11-14 RX ORDER — PANTOPRAZOLE SODIUM 20 MG/1
40 TABLET, DELAYED RELEASE ORAL
Refills: 0 | Status: DISCONTINUED | OUTPATIENT
Start: 2019-11-14 | End: 2019-11-15

## 2019-11-14 RX ORDER — CALCIUM GLUCONATE 100 MG/ML
2 VIAL (ML) INTRAVENOUS ONCE
Refills: 0 | Status: COMPLETED | OUTPATIENT
Start: 2019-11-14 | End: 2019-11-14

## 2019-11-14 RX ORDER — CHLOROTHIAZIDE 500 MG
500 TABLET ORAL ONCE
Refills: 0 | Status: COMPLETED | OUTPATIENT
Start: 2019-11-14 | End: 2019-11-14

## 2019-11-14 RX ORDER — IPRATROPIUM/ALBUTEROL SULFATE 18-103MCG
3 AEROSOL WITH ADAPTER (GRAM) INHALATION ONCE
Refills: 0 | Status: COMPLETED | OUTPATIENT
Start: 2019-11-14 | End: 2019-11-14

## 2019-11-14 RX ORDER — ATORVASTATIN CALCIUM 80 MG/1
40 TABLET, FILM COATED ORAL AT BEDTIME
Refills: 0 | Status: DISCONTINUED | OUTPATIENT
Start: 2019-11-14 | End: 2019-11-14

## 2019-11-14 RX ORDER — FUROSEMIDE 40 MG
80 TABLET ORAL ONCE
Refills: 0 | Status: COMPLETED | OUTPATIENT
Start: 2019-11-14 | End: 2019-11-14

## 2019-11-14 RX ORDER — ALBUMIN HUMAN 25 %
250 VIAL (ML) INTRAVENOUS
Refills: 0 | Status: COMPLETED | OUTPATIENT
Start: 2019-11-14 | End: 2019-11-14

## 2019-11-14 RX ORDER — PANTOPRAZOLE SODIUM 20 MG/1
40 TABLET, DELAYED RELEASE ORAL DAILY
Refills: 0 | Status: DISCONTINUED | OUTPATIENT
Start: 2019-11-14 | End: 2019-11-19

## 2019-11-14 RX ORDER — MANNITOL
50 POWDER (GRAM) MISCELLANEOUS
Qty: 100 | Refills: 0 | Status: DISCONTINUED | OUTPATIENT
Start: 2019-11-14 | End: 2019-11-15

## 2019-11-14 RX ORDER — SODIUM CHLORIDE 9 MG/ML
3 INJECTION INTRAMUSCULAR; INTRAVENOUS; SUBCUTANEOUS EVERY 8 HOURS
Refills: 0 | Status: DISCONTINUED | OUTPATIENT
Start: 2019-11-14 | End: 2019-11-19

## 2019-11-14 RX ORDER — MORPHINE SULFATE 50 MG/1
1 CAPSULE, EXTENDED RELEASE ORAL ONCE
Refills: 0 | Status: DISCONTINUED | OUTPATIENT
Start: 2019-11-14 | End: 2019-11-14

## 2019-11-14 RX ADMIN — Medication 100 MILLIGRAM(S): at 19:41

## 2019-11-14 RX ADMIN — Medication 200 GRAM(S): at 22:51

## 2019-11-14 RX ADMIN — Medication 120 MILLIGRAM(S): at 19:24

## 2019-11-14 RX ADMIN — SODIUM CHLORIDE 3 MILLILITER(S): 9 INJECTION INTRAMUSCULAR; INTRAVENOUS; SUBCUTANEOUS at 21:38

## 2019-11-14 RX ADMIN — Medication 125 MILLILITER(S): at 22:03

## 2019-11-14 RX ADMIN — Medication 200 GRAM(S): at 19:38

## 2019-11-14 RX ADMIN — Medication 3 MILLILITER(S): at 20:58

## 2019-11-14 RX ADMIN — Medication 50 MILLIEQUIVALENT(S): at 20:00

## 2019-11-14 RX ADMIN — VASOPRESSIN 1.8 UNIT(S)/MIN: 20 INJECTION INTRAVENOUS at 21:11

## 2019-11-14 RX ADMIN — SODIUM CHLORIDE 75 MILLILITER(S): 9 INJECTION INTRAMUSCULAR; INTRAVENOUS; SUBCUTANEOUS at 21:49

## 2019-11-14 RX ADMIN — Medication 80 MILLIGRAM(S): at 21:40

## 2019-11-14 RX ADMIN — SODIUM POLYSTYRENE SULFONATE 30 GRAM(S): 4.1 POWDER, FOR SUSPENSION ORAL at 21:38

## 2019-11-14 RX ADMIN — MORPHINE SULFATE 1 MILLIGRAM(S): 50 CAPSULE, EXTENDED RELEASE ORAL at 18:00

## 2019-11-14 RX ADMIN — Medication 125 MILLILITER(S): at 21:45

## 2019-11-14 RX ADMIN — HEPARIN SODIUM 7500 UNIT(S): 5000 INJECTION INTRAVENOUS; SUBCUTANEOUS at 21:52

## 2019-11-14 RX ADMIN — Medication 50 MILLIEQUIVALENT(S): at 21:41

## 2019-11-14 NOTE — H&P ADULT - NSHPREVIEWOFSYSTEMS_GEN_ALL_CORE
Review of Systems  CONSTITUTIONAL:  Denies Fevers / chills, sweats, fatigue, weight loss, weight gain                                      NEURO:  Denies parathesias, seizures, syncope, confusion                                                                                EYES:  Denies Blurry vision, discharge, pain, loss of vision                                                                                    ENMT:  Denies Difficulty hearing, vertigo, dysphagia, epistaxis, recent dental work                                       CV:  -Chest pain, -palpitations,+ ELIZABETH, -orthopnea                                                                                          RESPIRATORY:  -Wheezing, +SOB, -cough / sputum, -hemoptysis                                                                GI:  Denies Nausea, vommiting, diarrhea, constipation, melena, difficulty swallowing                                               : Denies Hematuria, dysuria, urgency, incontinence                                                                                         MUSKULOSKELETAL:  Denies arthritis, joint swelling, muscle weakness                                                             SKIN/BREAST:  Denies rash, itching, mustapha loss, masses                                                                                            PSYCH:  Denies depresion, anxiety, suicidal ideation                                                                                               HEME/LYMPH:  Denies bruises easily, enlarged lymph nodes, tender lymph nodes                                        ENDOCRINE:  Denies cold intolerance, heat intolerance, polydipsia

## 2019-11-14 NOTE — H&P ADULT - NSHPPHYSICALEXAM_GEN_ALL_CORE
Physical Exam  CONSTITUTIONAL:  anxious, on bipap   NEURO:      WNL                      EYES:   WNL  ENMT:     WNL  CV:  RRR, no mr/g/  RESPIRATORY: CTA b/l no w/r/r  GI: NT-ND, soft   : IVORY +   MUSKULOSKELETAL:      WNL  SKIN / BREAST:    active venous oozing from mediastinal sternal incision, no erythema, eccymossi. +sanguinous drianage

## 2019-11-14 NOTE — H&P ADULT - NSICDXPASTSURGICALHX_GEN_ALL_CORE_FT
PAST SURGICAL HISTORY:  H/O arthroscopy of knee     S/P CABG (coronary artery bypass graft)     S/P cholecystectomy

## 2019-11-14 NOTE — PROGRESS NOTE ADULT - SUBJECTIVE AND OBJECTIVE BOX
CTICU  CRITICAL  CARE  attending     Hand off received 					   Pertinent clinical, laboratory, radiographic, hemodynamic, echocardiographic, respiratory data, microbiologic data and chart were reviewed and analyzed frequently throughout the course of the day and night    Patient seen and examined with CTS/ SH attending at bedside  60 y/o M with a PMHx of HTN, HLD, obesity, psorasis, cholecystectomy and known CAD s/p PTCA/RADHA LAD & D1 presented to cardiologist with CCS class II symptoms. Cardiac cath was performed revealing 3-vessel CAD. On  pt underwent OPCAB x 3 EF 45%. OR course was uncomplicated and patient arrived [facility] ext[facility]ated. He was noted to have an airleak in his CT on POD 1. His mediastinal and left pleural chest tube were placed to water seal on POD 2. CXR on POD 3 + small apical ptx and pleural tube was placed back on suction, mediastinal tube was clamped. CXR revealed re-expansion of the lung and mediastinal tube removed at the bedside, f/u CXR with small left apical pnuemothorax. On POD 4 the L pleural CT was placed on waterseal with a f/u CXR showing small left apical pnuemothorax again. On POD 5, the CT remained on suction throughout the day and through the night. POD#6, Ct was removed and pt was later discharged. Today on 19, pt presented to [facility] c/o severe anxiety and SOB. ED labs pertinent for K >6, BUN/Cr 36/3.22, AST/ALT >5000, Lactate 13 and bleeding was noted from sternotomy incision. As a result pt was transferred to CTICU emergently for evaluation.     On presentation pt c/o SOB, incisional pain and sternal bleeding. Pt denied CP, abd pain, urinary complaints, dizziness or syncope.      FAMILY HISTORY:  FH: heart disease  PAST MEDICAL & SURGICAL HISTORY:  Presence of stent in LAD coronary artery  CAD in native artery  Psoriasis  Obesity  HLD (hyperlipidemia)  HTN (hypertension)  S/P CABG (coronary artery bypass graft)  H/O arthroscopy of knee  S/P cholecystectomy    Patient is a 59y old  Male who presents with Pericardial Effusion and tamponade.      14 system review was unremarkable    Vital signs, hemodynamic and respiratory parameters were reviewed from the bedside nursing  flow sheet.  ICU Vital Signs Last 24 Hrs  T(C): 36.7 (2019 22:25), Max: 36.7 (2019 22:25)  T(F): 98.1 (2019 22:25), Max: 98.1 (2019 22:25)  HR: 70 (:) (70 - 80)  BP: 138/74 (2019 17:41) (129/68 - 138/74)  BP(mean): 90 (2019 17:41) (90 - 91)  ABP: 140/74 (2019 23:00) (124/62 - 156/82)  ABP(mean): 96 (:) (84 - 108)  RR: 20 (:) (20 - 75)  SpO2: 99% (:) (91% - 100%)    Adult Advanced Hemodynamics Last 24 Hrs  CVP(mm Hg): 20 (:) (10 - 20)  CVP(cm H2O): --  CO: 5.7 (:) (5.6 - 7)  CI: 2.6 (2019 23:) (2.6 - 3.2)  PA: --  PA(mean): --  PCWP: --  SVR: 1065 (2019 23:00) (958 - 1255)  SVRI: 2335 (2019 23:) (8627 - 2704)  PVR: --  PVRI: --, ABG - ( 2019 23:04 )  pH, Arterial: 7.42  pH, Blood: x     /  pCO2: 28    /  pO2: 112   / HCO3: 18    / Base Excess: -5.1  /  SaO2: 98                  Intake and output was reviewed and the fluid balance was calculated  Daily Height in cm: 165.1 (2019 18:08)    Daily Weight in k.9 (2019 18:08)  I&O's Summary    2019 07:01  -  2019 23:20  --------------------------------------------------------  IN: 780.4 mL / OUT: 380 mL / NET: 400.4 mL        All lines and drain sites were assessed    Neuro: No change in the mental status from the baseline. Moves all 4 extremities. Reflexes 2+.  Neck: No JVD.  CVS: S1, S1, No S3.  Lungs: Good air entry bilaterally.  Abd: Soft. No tenderness. + Bowel sounds.  Vascular: + DP/PT.  Extremities: No edema.  Lymphatic: Normal.  Skin: No abnormalities.      labs  CBC Full  -  ( 2019 22:35 )  WBC Count : 27.38 K/uL  RBC Count : 2.59 M/uL  Hemoglobin : 7.7 g/dL  Hematocrit : 23.4 %  Platelet Count - Automated : 218 K/uL  Mean Cell Volume : 90.3 fl  Mean Cell Hemoglobin : 29.7 pg  Mean Cell Hemoglobin Concentration : 32.9 gm/dL  Auto Neutrophil # : x  Auto Lymphocyte # : x  Auto Monocyte # : x  Auto Eosinophil # : x  Auto Basophil # : x  Auto Neutrophil % : x  Auto Lymphocyte % : x  Auto Monocyte % : x  Auto Eosinophil % : x  Auto Basophil % : x        132<L>  |  98  |  49<H>  ----------------------------<  128<H>  6.0<H>   |  17<L>  |  3.82<H>    Ca    8.4      2019 22:35  Phos  7.0       Mg     2.3         TPro  6.0  /  Alb  3.2<L>  /  TBili  2.6<H>  /  DBili  x   /  AST  >7000<H>  /  ALT  >7000<H>  /  AlkPhos  146<H>      PT/INR - ( 2019 17:54 )   PT: 29.5 sec;   INR: 2.54          PTT - ( 2019 17:54 )  PTT:36.2 sec  The current medications were reviewed   MEDICATIONS  (STANDING):  aspirin enteric coated 81 milliGRAM(s) Oral daily  calcium gluconate IVPB 2 Gram(s) IV Intermittent once  clopidogrel Tablet 75 milliGRAM(s) Oral daily  furosemide Infusion 10 mG/Hr (5 mL/Hr) IV Continuous <Continuous>  heparin  Injectable 7500 Unit(s) SubCutaneous every 8 hours  pantoprazole    Tablet 40 milliGRAM(s) Oral before breakfast  pantoprazole  Injectable 40 milliGRAM(s) IV Push daily  sodium chloride 0.9% lock flush 3 milliLiter(s) IV Push every 8 hours  sodium chloride 0.9%. 1000 milliLiter(s) (75 mL/Hr) IV Continuous <Continuous>  vasopressin Infusion 0.03 Unit(s)/Min (1.8 mL/Hr) IV Continuous <Continuous>          Patient is a 59y old  Male who presents with Pericardial Effusion and tamponade.  S/P Mediastinal Exploration.  Acute Renal Failure.  Metabolic acidosis.  Acute Hepatic Insufficiency.  Hemodynamically stable.  Good oxygenation.  Fair urine out put.  Overall doing well.      My plan includes :  Diuretic infusion.  Consider Hemodialysis.  Close hemodynamic, ventilatory and drain monitoring and management  Monitor for arrhythmias and monitor parameters for organ perfusion  Monitor neurologic status  Monitor renal function.  Head of the bed should remain elevated to 45 deg .   Chest PT and IS will be encouraged  Monitor  adequacy of oxygenation and ventilation and attempt to wean oxygen  Nutritional goals will be met using po eventually , ensure adequate caloric intake and monitor the same  Stress ulcer and VTE prophylaxis will be achieved    Glycemic control is satisfactory  Electrolytes have been repleted as necessary and wound care has been carried out. Pain control has been achieved.   Aggressive physical therapy and early mobility and ambulation goals will be met   The family was updated about the course and plan  CRITICAL CARE TIME SPENT in evaluation and management, reassessments, review and interpretation of labs and x-rays, ventilator and hemodynamic management, formulating a plan and coordinating care: ___90____ MIN.  Time does not include procedural time.  CTICU ATTENDING     					    Eagle Castellano MD

## 2019-11-14 NOTE — H&P ADULT - HISTORY OF PRESENT ILLNESS
60 y/o M with a PMHx of HTN, HLD, obesity, psorasis, cholecystectomy and known CAD s/p PTCA/RADHA LAD & D1 presented to cardiologist with CCS class II symptoms. Cardiac cath was performed revealing 3-vessel CAD. On 11/6 pt underwent OPCAB x 3 EF 45%. OR course was uncomplicated and [facility]. He was noted to have an airleak in his CT on POD 1. His mediastinal and left pleural chest tube were placed to water seal on POD 2. CXR on POD 3 + small apical ptx and pleural tube was placed back on suction, mediastinal tube was clamped. CXR revealed re-expansion of the lung and mediastinal tube removed at the bedside, f/u CXR with small left apical pnuemothorax. On POD 4 the L pleural CT was placed on waterseal with a f/u CXR showing small left apical pnuemothorax again. On POD 5, the CT remained on suction throughout the day and through the night. POD#6, Ct was removed and pt was later discharged. Today on 11/14/19, pt presented to [facility] c/o severe anxiety and SOB. ED labs pertinent for K >6, BUN/Cr 36/3.22, AST/ALT >5000, Lactate 13 and bleeding was noted from sternotomy incision. As a result pt was transferred to CTICU emergently for evaluation.     On presentation pt c/o SOB, incisional pain and sternal bleeding. Pt denied CP, abd pain, urinary complaints, dizziness or syncope.

## 2019-11-14 NOTE — PROGRESS NOTE ADULT - SUBJECTIVE AND OBJECTIVE BOX
CRYSTAL MORLEY   MRN#: [id number]     The patient is a 59y Male who was seen, evaluated, & examined with the CTICU staff post-operatively with a multidisciplinary care plan formulated & implemented.  All available clinical, laboratory, radiographic, pharmacologic, and electrocardiographic data were reviewed & analyzed.      The patient was in the CTICU in critical condition secondary to:     persistent cardiopulmonary dysfunction - acute hypoxic respiratory failure  hyperlactatemia-acidosis    For support and evaluation & prevention of further decompensation secondary to persistent cardiopulmonary dysfunction, respiratory status required:     supplemental oxygen with high flow nasal cannula  continuous pulse oximetry monitoring  following ABGs with A-line monitoring    Invasive hemodynamic monitoring with an A-line was required for continuous MAP/BP monitoring to ensure adequate cardiovascular support and to evaluate for & help prevent decompensation    In addition:  S/p recent OPCAB, discharge 2 days prior, re-admit after presenting with dyspnea and found to have moderate pericardial effusion, sternal wound dehiscence with drainage and increased work of breathing  Sternal wound opened at bedside and wound vac placed  Lactate initially 13 now down to 5  Cr and potassium elevated, ? poor forward flow in setting of tamponde-like/tampanoid physiology - challenging with Lasix - if he does not respond will likely need dialysis  HFNC for increased work of breathing in the setting of fluid overload and acidosis - temporized with bicarb, may need to intubate if work of breathing does not improve    The patient required critical care management and I personally provided 75 minutes of non-continuous care to the patient, excluding separate procedures, in addition to  discussing the patient and plan at length with the CTICU staff and helping coordinate care.

## 2019-11-14 NOTE — H&P ADULT - ASSESSMENT
58 y/o M with a PMHx of HTN, HLD, obesity, psorasis, cholecystectomy and known CAD s/p PTCA/RADHA LAD & D1 presented to cardiologist with CCS class II symptoms. Cardiac cath was performed revealing 3-vessel CAD. On 11/6 pt underwent OPCAB x 3 EF 45%. OR course was uncomplicated and [facility]. post-op c/o by mild left apical PTX, CXR was stable on day of discharge on POD#6. Today on 11/14/19, pt presented to [facility] c/o severe anxiety and SOB. ED labs pertinent for K >6, BUN/Cr 36/3.22, AST/ALT >5000, Lactate 13 and bleeding was noted from sternotomy incision. As a result pt was transferred to CTICU emergently for evaluation.     A/P:  Neurovascular: No delirium. Pain well controlled with current regimen.    Cardiovascular: Hemodynamically stable. HR controlled. s/p CABG, ?? pericardial effusion/tamponade    -f/u bedside echo  -monitor BP/HR/Tele   -holding ASA/Plavix in light of active sternal bleeding      Respiratory: 02 Sat = 98% on BIPAP/HFNC   -Encourage C+DB and Use of IS 10x / hr while awake.  -CXR.- f/u admission imaging     GI: Stable.  -NPO   -PPX- protonix    Renal / : GAUTAM likely 2/2 pericardial effusion, f/u admission labs   -cowart in place from outside hospital   -possible HD cath placement for OSH K >6 and GAUTAM   -Monitor renal function.  -Monitor I/O's.    Endocrine:  no h/o DM or thyroid disease  -A1c- 5.1  -TSH- 1.3    Hematologic: f/u admission labs     ID: f/u admission blood cultures, and UA  -Temp- afebrile   - f/u WBC   -Observe for SIRS/Sepsis Syndrome.    Prophylaxis:  -holding SQH in light of sternal bleeding and possible tamponade   -SCD's    Disposition:  -ICU for frequent monitoring.

## 2019-11-15 DIAGNOSIS — Z90.49 ACQUIRED ABSENCE OF OTHER SPECIFIED PARTS OF DIGESTIVE TRACT: ICD-10-CM

## 2019-11-15 DIAGNOSIS — E78.5 HYPERLIPIDEMIA, UNSPECIFIED: ICD-10-CM

## 2019-11-15 DIAGNOSIS — I10 ESSENTIAL (PRIMARY) HYPERTENSION: ICD-10-CM

## 2019-11-15 DIAGNOSIS — I25.10 ATHEROSCLEROTIC HEART DISEASE OF NATIVE CORONARY ARTERY WITHOUT ANGINA PECTORIS: ICD-10-CM
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
## 2019-11-15 DIAGNOSIS — L40.9 PSORIASIS, UNSPECIFIED: ICD-10-CM

## 2019-11-15 DIAGNOSIS — E66.9 OBESITY, UNSPECIFIED: ICD-10-CM

## 2019-11-15 DIAGNOSIS — Z95.5 PRESENCE OF CORONARY ANGIOPLASTY IMPLANT AND GRAFT: ICD-10-CM

## 2019-11-15 DIAGNOSIS — J95.811 POSTPROCEDURAL PNEUMOTHORAX: ICD-10-CM

## 2019-11-15 LAB
ALBUMIN SERPL ELPH-MCNC: 3.1 G/DL — LOW (ref 3.3–5)
ALBUMIN SERPL ELPH-MCNC: 3.1 G/DL — LOW (ref 3.3–5)
ALBUMIN SERPL ELPH-MCNC: 3.2 G/DL — LOW (ref 3.3–5)
ALBUMIN SERPL ELPH-MCNC: 3.3 G/DL — SIGNIFICANT CHANGE UP (ref 3.3–5)
ALBUMIN SERPL ELPH-MCNC: 3.4 G/DL — SIGNIFICANT CHANGE UP (ref 3.3–5)
ALBUMIN SERPL ELPH-MCNC: 3.4 G/DL — SIGNIFICANT CHANGE UP (ref 3.3–5)
ALP SERPL-CCNC: 127 U/L — HIGH (ref 40–120)
ALP SERPL-CCNC: 130 U/L — HIGH (ref 40–120)
ALP SERPL-CCNC: 144 U/L — HIGH (ref 40–120)
ALP SERPL-CCNC: 157 U/L — HIGH (ref 40–120)
ALP SERPL-CCNC: 170 U/L — HIGH (ref 40–120)
ALP SERPL-CCNC: 181 U/L — HIGH (ref 40–120)
ALT FLD-CCNC: >7000 U/L — HIGH (ref 10–45)
ALT FLD-CCNC: >7000 U/L — SIGNIFICANT CHANGE UP (ref 10–45)
ALT FLD-CCNC: >7000 U/L — SIGNIFICANT CHANGE UP (ref 10–45)
AMMONIA BLD-MCNC: 124 UMOL/L — HIGH (ref 11–55)
AMMONIA BLD-MCNC: 140 UMOL/L — HIGH (ref 11–55)
ANION GAP SERPL CALC-SCNC: 12 MMOL/L — SIGNIFICANT CHANGE UP (ref 5–17)
ANION GAP SERPL CALC-SCNC: 13 MMOL/L — SIGNIFICANT CHANGE UP (ref 5–17)
ANION GAP SERPL CALC-SCNC: 15 MMOL/L — SIGNIFICANT CHANGE UP (ref 5–17)
ANION GAP SERPL CALC-SCNC: 17 MMOL/L — SIGNIFICANT CHANGE UP (ref 5–17)
APPEARANCE UR: ABNORMAL
APTT BLD: 31.7 SEC — SIGNIFICANT CHANGE UP (ref 27.5–36.3)
APTT BLD: 33.9 SEC — SIGNIFICANT CHANGE UP (ref 27.5–36.3)
APTT BLD: 35 SEC — SIGNIFICANT CHANGE UP (ref 27.5–36.3)
APTT BLD: 35.1 SEC — SIGNIFICANT CHANGE UP (ref 27.5–36.3)
AST SERPL-CCNC: 4824 U/L — HIGH (ref 10–40)
AST SERPL-CCNC: 6746 U/L — HIGH (ref 10–40)
AST SERPL-CCNC: >7000 U/L — HIGH (ref 10–40)
BILIRUB SERPL-MCNC: 2.5 MG/DL — HIGH (ref 0.2–1.2)
BILIRUB SERPL-MCNC: 2.5 MG/DL — HIGH (ref 0.2–1.2)
BILIRUB SERPL-MCNC: 2.8 MG/DL — HIGH (ref 0.2–1.2)
BILIRUB SERPL-MCNC: 2.8 MG/DL — HIGH (ref 0.2–1.2)
BILIRUB SERPL-MCNC: 2.9 MG/DL — HIGH (ref 0.2–1.2)
BILIRUB SERPL-MCNC: 3.2 MG/DL — HIGH (ref 0.2–1.2)
BILIRUB UR-MCNC: NEGATIVE — SIGNIFICANT CHANGE UP
BUN SERPL-MCNC: 36 MG/DL — HIGH (ref 7–23)
BUN SERPL-MCNC: 51 MG/DL — HIGH (ref 7–23)
BUN SERPL-MCNC: 54 MG/DL — HIGH (ref 7–23)
BUN SERPL-MCNC: 54 MG/DL — HIGH (ref 7–23)
BUN SERPL-MCNC: 55 MG/DL — HIGH (ref 7–23)
BUN SERPL-MCNC: 56 MG/DL — HIGH (ref 7–23)
BUN SERPL-MCNC: 58 MG/DL — HIGH (ref 7–23)
BUN SERPL-MCNC: 59 MG/DL — HIGH (ref 7–23)
CALCIUM SERPL-MCNC: 7.7 MG/DL — LOW (ref 8.4–10.5)
CALCIUM SERPL-MCNC: 7.8 MG/DL — LOW (ref 8.4–10.5)
CALCIUM SERPL-MCNC: 7.9 MG/DL — LOW (ref 8.4–10.5)
CALCIUM SERPL-MCNC: 8 MG/DL — LOW (ref 8.4–10.5)
CALCIUM SERPL-MCNC: 8 MG/DL — LOW (ref 8.4–10.5)
CALCIUM SERPL-MCNC: 8.3 MG/DL — LOW (ref 8.4–10.5)
CALCIUM SERPL-MCNC: 8.3 MG/DL — LOW (ref 8.4–10.5)
CALCIUM SERPL-MCNC: 8.5 MG/DL — SIGNIFICANT CHANGE UP (ref 8.4–10.5)
CHLORIDE SERPL-SCNC: 88 MMOL/L — LOW (ref 96–108)
CHLORIDE SERPL-SCNC: 90 MMOL/L — LOW (ref 96–108)
CHLORIDE SERPL-SCNC: 90 MMOL/L — LOW (ref 96–108)
CHLORIDE SERPL-SCNC: 91 MMOL/L — LOW (ref 96–108)
CHLORIDE SERPL-SCNC: 92 MMOL/L — LOW (ref 96–108)
CHLORIDE SERPL-SCNC: 92 MMOL/L — LOW (ref 96–108)
CHLORIDE SERPL-SCNC: 93 MMOL/L — LOW (ref 96–108)
CHLORIDE SERPL-SCNC: 96 MMOL/L — SIGNIFICANT CHANGE UP (ref 96–108)
CHLORIDE UR-SCNC: 107 MMOL/L — SIGNIFICANT CHANGE UP
CO2 SERPL-SCNC: 16 MMOL/L — LOW (ref 22–31)
CO2 SERPL-SCNC: 17 MMOL/L — LOW (ref 22–31)
CO2 SERPL-SCNC: 18 MMOL/L — LOW (ref 22–31)
CO2 SERPL-SCNC: 18 MMOL/L — LOW (ref 22–31)
CO2 SERPL-SCNC: 22 MMOL/L — SIGNIFICANT CHANGE UP (ref 22–31)
COLOR SPEC: YELLOW — SIGNIFICANT CHANGE UP
CREAT ?TM UR-MCNC: 17 MG/DL — SIGNIFICANT CHANGE UP
CREAT SERPL-MCNC: 3.88 MG/DL — HIGH (ref 0.5–1.3)
CREAT SERPL-MCNC: 4.1 MG/DL — HIGH (ref 0.5–1.3)
CREAT SERPL-MCNC: 4.43 MG/DL — HIGH (ref 0.5–1.3)
CREAT SERPL-MCNC: 4.67 MG/DL — HIGH (ref 0.5–1.3)
CREAT SERPL-MCNC: 4.75 MG/DL — HIGH (ref 0.5–1.3)
CREAT SERPL-MCNC: 4.95 MG/DL — HIGH (ref 0.5–1.3)
CREAT SERPL-MCNC: 4.97 MG/DL — HIGH (ref 0.5–1.3)
CREAT SERPL-MCNC: 5.13 MG/DL — HIGH (ref 0.5–1.3)
DIFF PNL FLD: ABNORMAL
GAS PNL BLDA: SIGNIFICANT CHANGE UP
GAS PNL BLDV: SIGNIFICANT CHANGE UP
GLUCOSE BLDC GLUCOMTR-MCNC: 105 MG/DL — HIGH (ref 70–99)
GLUCOSE SERPL-MCNC: 103 MG/DL — HIGH (ref 70–99)
GLUCOSE SERPL-MCNC: 105 MG/DL — HIGH (ref 70–99)
GLUCOSE SERPL-MCNC: 117 MG/DL — HIGH (ref 70–99)
GLUCOSE SERPL-MCNC: 123 MG/DL — HIGH (ref 70–99)
GLUCOSE SERPL-MCNC: 203 MG/DL — HIGH (ref 70–99)
GLUCOSE SERPL-MCNC: 87 MG/DL — SIGNIFICANT CHANGE UP (ref 70–99)
GLUCOSE SERPL-MCNC: 89 MG/DL — SIGNIFICANT CHANGE UP (ref 70–99)
GLUCOSE SERPL-MCNC: 90 MG/DL — SIGNIFICANT CHANGE UP (ref 70–99)
GLUCOSE UR QL: NEGATIVE — SIGNIFICANT CHANGE UP
HBV CORE IGM SER-ACNC: SIGNIFICANT CHANGE UP
HBV SURFACE AB SER-ACNC: REACTIVE — SIGNIFICANT CHANGE UP
HBV SURFACE AG SER-ACNC: SIGNIFICANT CHANGE UP
HCT VFR BLD CALC: 22.7 % — LOW (ref 39–50)
HCT VFR BLD CALC: 27.6 % — LOW (ref 39–50)
HCT VFR BLD CALC: 28.3 % — LOW (ref 39–50)
HCT VFR BLD CALC: 28.4 % — LOW (ref 39–50)
HGB BLD-MCNC: 7.4 G/DL — LOW (ref 13–17)
HGB BLD-MCNC: 9 G/DL — LOW (ref 13–17)
HGB BLD-MCNC: 9.3 G/DL — LOW (ref 13–17)
HGB BLD-MCNC: 9.8 G/DL — LOW (ref 13–17)
INR BLD: 2.11 — HIGH (ref 0.88–1.16)
INR BLD: 2.22 — HIGH (ref 0.88–1.16)
INR BLD: 2.44 — HIGH (ref 0.88–1.16)
INR BLD: 2.64 — HIGH (ref 0.88–1.16)
KETONES UR-MCNC: NEGATIVE — SIGNIFICANT CHANGE UP
LACTATE SERPL-SCNC: 1.3 MMOL/L — SIGNIFICANT CHANGE UP (ref 0.5–2)
LACTATE SERPL-SCNC: 1.4 MMOL/L — SIGNIFICANT CHANGE UP (ref 0.5–2)
LACTATE SERPL-SCNC: 1.4 MMOL/L — SIGNIFICANT CHANGE UP (ref 0.5–2)
LACTATE SERPL-SCNC: 1.5 MMOL/L — SIGNIFICANT CHANGE UP (ref 0.5–2)
LACTATE SERPL-SCNC: 2.1 MMOL/L — HIGH (ref 0.5–2)
LEUKOCYTE ESTERASE UR-ACNC: NEGATIVE — SIGNIFICANT CHANGE UP
MAGNESIUM SERPL-MCNC: 2.2 MG/DL — SIGNIFICANT CHANGE UP (ref 1.6–2.6)
MAGNESIUM SERPL-MCNC: 2.2 MG/DL — SIGNIFICANT CHANGE UP (ref 1.6–2.6)
MAGNESIUM SERPL-MCNC: 2.3 MG/DL — SIGNIFICANT CHANGE UP (ref 1.6–2.6)
MAGNESIUM SERPL-MCNC: 2.3 MG/DL — SIGNIFICANT CHANGE UP (ref 1.6–2.6)
MCHC RBC-ENTMCNC: 29.2 PG — SIGNIFICANT CHANGE UP (ref 27–34)
MCHC RBC-ENTMCNC: 29.3 PG — SIGNIFICANT CHANGE UP (ref 27–34)
MCHC RBC-ENTMCNC: 29.3 PG — SIGNIFICANT CHANGE UP (ref 27–34)
MCHC RBC-ENTMCNC: 30.7 PG — SIGNIFICANT CHANGE UP (ref 27–34)
MCHC RBC-ENTMCNC: 32.6 GM/DL — SIGNIFICANT CHANGE UP (ref 32–36)
MCHC RBC-ENTMCNC: 32.6 GM/DL — SIGNIFICANT CHANGE UP (ref 32–36)
MCHC RBC-ENTMCNC: 32.9 GM/DL — SIGNIFICANT CHANGE UP (ref 32–36)
MCHC RBC-ENTMCNC: 34.5 GM/DL — SIGNIFICANT CHANGE UP (ref 32–36)
MCV RBC AUTO: 89 FL — SIGNIFICANT CHANGE UP (ref 80–100)
MCV RBC AUTO: 89.3 FL — SIGNIFICANT CHANGE UP (ref 80–100)
MCV RBC AUTO: 89.7 FL — SIGNIFICANT CHANGE UP (ref 80–100)
MCV RBC AUTO: 89.9 FL — SIGNIFICANT CHANGE UP (ref 80–100)
NITRITE UR-MCNC: NEGATIVE — SIGNIFICANT CHANGE UP
NRBC # BLD: 0 /100 WBCS — SIGNIFICANT CHANGE UP (ref 0–0)
OSMOLALITY SERPL: 311 MOSMOL/KG — HIGH (ref 280–301)
OSMOLALITY UR: 319 MOSMOL/KG — SIGNIFICANT CHANGE UP (ref 100–650)
PH UR: 5.5 — SIGNIFICANT CHANGE UP (ref 5–8)
PHOSPHATE SERPL-MCNC: 5.1 MG/DL — HIGH (ref 2.5–4.5)
PHOSPHATE SERPL-MCNC: 5.8 MG/DL — HIGH (ref 2.5–4.5)
PHOSPHATE SERPL-MCNC: 6.4 MG/DL — HIGH (ref 2.5–4.5)
PHOSPHATE SERPL-MCNC: 6.6 MG/DL — HIGH (ref 2.5–4.5)
PLATELET # BLD AUTO: 158 K/UL — SIGNIFICANT CHANGE UP (ref 150–400)
PLATELET # BLD AUTO: 161 K/UL — SIGNIFICANT CHANGE UP (ref 150–400)
PLATELET # BLD AUTO: 169 K/UL — SIGNIFICANT CHANGE UP (ref 150–400)
PLATELET # BLD AUTO: 174 K/UL — SIGNIFICANT CHANGE UP (ref 150–400)
POTASSIUM SERPL-MCNC: 4.3 MMOL/L — SIGNIFICANT CHANGE UP (ref 3.5–5.3)
POTASSIUM SERPL-MCNC: 5.6 MMOL/L — HIGH (ref 3.5–5.3)
POTASSIUM SERPL-MCNC: 5.8 MMOL/L — HIGH (ref 3.5–5.3)
POTASSIUM SERPL-MCNC: 6 MMOL/L — HIGH (ref 3.5–5.3)
POTASSIUM SERPL-MCNC: 6.1 MMOL/L — HIGH (ref 3.5–5.3)
POTASSIUM SERPL-MCNC: 6.4 MMOL/L — CRITICAL HIGH (ref 3.5–5.3)
POTASSIUM SERPL-MCNC: 6.4 MMOL/L — CRITICAL HIGH (ref 3.5–5.3)
POTASSIUM SERPL-MCNC: 6.9 MMOL/L — CRITICAL HIGH (ref 3.5–5.3)
POTASSIUM SERPL-SCNC: 4.3 MMOL/L — SIGNIFICANT CHANGE UP (ref 3.5–5.3)
POTASSIUM SERPL-SCNC: 5.6 MMOL/L — HIGH (ref 3.5–5.3)
POTASSIUM SERPL-SCNC: 5.8 MMOL/L — HIGH (ref 3.5–5.3)
POTASSIUM SERPL-SCNC: 6 MMOL/L — HIGH (ref 3.5–5.3)
POTASSIUM SERPL-SCNC: 6.1 MMOL/L — HIGH (ref 3.5–5.3)
POTASSIUM SERPL-SCNC: 6.4 MMOL/L — CRITICAL HIGH (ref 3.5–5.3)
POTASSIUM SERPL-SCNC: 6.4 MMOL/L — CRITICAL HIGH (ref 3.5–5.3)
POTASSIUM SERPL-SCNC: 6.9 MMOL/L — CRITICAL HIGH (ref 3.5–5.3)
POTASSIUM UR-SCNC: 26 MMOL/L — SIGNIFICANT CHANGE UP
PROT SERPL-MCNC: 5.2 G/DL — LOW (ref 6–8.3)
PROT SERPL-MCNC: 5.4 G/DL — LOW (ref 6–8.3)
PROT SERPL-MCNC: 5.6 G/DL — LOW (ref 6–8.3)
PROT SERPL-MCNC: 5.6 G/DL — LOW (ref 6–8.3)
PROT SERPL-MCNC: 5.8 G/DL — LOW (ref 6–8.3)
PROT SERPL-MCNC: 5.8 G/DL — LOW (ref 6–8.3)
PROT UR-MCNC: ABNORMAL MG/DL
PROTHROM AB SERPL-ACNC: 24.4 SEC — HIGH (ref 10–12.9)
PROTHROM AB SERPL-ACNC: 25.7 SEC — HIGH (ref 10–12.9)
PROTHROM AB SERPL-ACNC: 28.4 SEC — HIGH (ref 10–12.9)
PROTHROM AB SERPL-ACNC: 30.7 SEC — HIGH (ref 10–12.9)
RBC # BLD: 2.53 M/UL — LOW (ref 4.2–5.8)
RBC # BLD: 3.07 M/UL — LOW (ref 4.2–5.8)
RBC # BLD: 3.17 M/UL — LOW (ref 4.2–5.8)
RBC # BLD: 3.19 M/UL — LOW (ref 4.2–5.8)
RBC # FLD: 13.7 % — SIGNIFICANT CHANGE UP (ref 10.3–14.5)
RBC # FLD: 13.7 % — SIGNIFICANT CHANGE UP (ref 10.3–14.5)
RBC # FLD: 14 % — SIGNIFICANT CHANGE UP (ref 10.3–14.5)
RBC # FLD: 14.1 % — SIGNIFICANT CHANGE UP (ref 10.3–14.5)
SODIUM SERPL-SCNC: 120 MMOL/L — CRITICAL LOW (ref 135–145)
SODIUM SERPL-SCNC: 120 MMOL/L — CRITICAL LOW (ref 135–145)
SODIUM SERPL-SCNC: 121 MMOL/L — LOW (ref 135–145)
SODIUM SERPL-SCNC: 123 MMOL/L — LOW (ref 135–145)
SODIUM SERPL-SCNC: 124 MMOL/L — LOW (ref 135–145)
SODIUM SERPL-SCNC: 126 MMOL/L — LOW (ref 135–145)
SODIUM SERPL-SCNC: 126 MMOL/L — LOW (ref 135–145)
SODIUM SERPL-SCNC: 131 MMOL/L — LOW (ref 135–145)
SODIUM UR-SCNC: 96 MMOL/L — SIGNIFICANT CHANGE UP
SP GR SPEC: 1.01 — SIGNIFICANT CHANGE UP (ref 1–1.03)
UROBILINOGEN FLD QL: 0.2 E.U./DL — SIGNIFICANT CHANGE UP
UUN UR-MCNC: 73 MG/DL — SIGNIFICANT CHANGE UP
WBC # BLD: 20.47 K/UL — HIGH (ref 3.8–10.5)
WBC # BLD: 22.09 K/UL — HIGH (ref 3.8–10.5)
WBC # BLD: 23.76 K/UL — HIGH (ref 3.8–10.5)
WBC # BLD: 24.76 K/UL — HIGH (ref 3.8–10.5)
WBC # FLD AUTO: 20.47 K/UL — HIGH (ref 3.8–10.5)
WBC # FLD AUTO: 22.09 K/UL — HIGH (ref 3.8–10.5)
WBC # FLD AUTO: 23.76 K/UL — HIGH (ref 3.8–10.5)
WBC # FLD AUTO: 24.76 K/UL — HIGH (ref 3.8–10.5)

## 2019-11-15 PROCEDURE — 93306 TTE W/DOPPLER COMPLETE: CPT

## 2019-11-15 PROCEDURE — 97161 PT EVAL LOW COMPLEX 20 MIN: CPT

## 2019-11-15 PROCEDURE — 80053 COMPREHEN METABOLIC PANEL: CPT

## 2019-11-15 PROCEDURE — 83605 ASSAY OF LACTIC ACID: CPT

## 2019-11-15 PROCEDURE — 84132 ASSAY OF SERUM POTASSIUM: CPT

## 2019-11-15 PROCEDURE — 93005 ELECTROCARDIOGRAM TRACING: CPT

## 2019-11-15 PROCEDURE — 84100 ASSAY OF PHOSPHORUS: CPT

## 2019-11-15 PROCEDURE — 85025 COMPLETE CBC W/AUTO DIFF WBC: CPT

## 2019-11-15 PROCEDURE — 99292 CRITICAL CARE ADDL 30 MIN: CPT

## 2019-11-15 PROCEDURE — 36620 INSERTION CATHETER ARTERY: CPT

## 2019-11-15 PROCEDURE — 99233 SBSQ HOSP IP/OBS HIGH 50: CPT

## 2019-11-15 PROCEDURE — 71045 X-RAY EXAM CHEST 1 VIEW: CPT | Mod: 26,76

## 2019-11-15 PROCEDURE — 99221 1ST HOSP IP/OBS SF/LOW 40: CPT | Mod: GC,25

## 2019-11-15 PROCEDURE — 85027 COMPLETE CBC AUTOMATED: CPT

## 2019-11-15 PROCEDURE — 36800 INSERTION OF CANNULA: CPT

## 2019-11-15 PROCEDURE — 90935 HEMODIALYSIS ONE EVALUATION: CPT | Mod: GC

## 2019-11-15 PROCEDURE — 80048 BASIC METABOLIC PNL TOTAL CA: CPT

## 2019-11-15 PROCEDURE — P9045: CPT

## 2019-11-15 PROCEDURE — 83735 ASSAY OF MAGNESIUM: CPT

## 2019-11-15 PROCEDURE — 82330 ASSAY OF CALCIUM: CPT

## 2019-11-15 PROCEDURE — 86850 RBC ANTIBODY SCREEN: CPT

## 2019-11-15 PROCEDURE — C1889: CPT

## 2019-11-15 PROCEDURE — 97530 THERAPEUTIC ACTIVITIES: CPT

## 2019-11-15 PROCEDURE — 86900 BLOOD TYPING SEROLOGIC ABO: CPT

## 2019-11-15 PROCEDURE — 93010 ELECTROCARDIOGRAM REPORT: CPT

## 2019-11-15 PROCEDURE — 85730 THROMBOPLASTIN TIME PARTIAL: CPT

## 2019-11-15 PROCEDURE — 84295 ASSAY OF SERUM SODIUM: CPT

## 2019-11-15 PROCEDURE — 99291 CRITICAL CARE FIRST HOUR: CPT

## 2019-11-15 PROCEDURE — 86923 COMPATIBILITY TEST ELECTRIC: CPT

## 2019-11-15 PROCEDURE — 94002 VENT MGMT INPAT INIT DAY: CPT

## 2019-11-15 PROCEDURE — 97116 GAIT TRAINING THERAPY: CPT

## 2019-11-15 PROCEDURE — 85610 PROTHROMBIN TIME: CPT

## 2019-11-15 PROCEDURE — 76937 US GUIDE VASCULAR ACCESS: CPT | Mod: 26

## 2019-11-15 PROCEDURE — 71045 X-RAY EXAM CHEST 1 VIEW: CPT

## 2019-11-15 PROCEDURE — C1894: CPT

## 2019-11-15 PROCEDURE — 82803 BLOOD GASES ANY COMBINATION: CPT

## 2019-11-15 PROCEDURE — 36415 COLL VENOUS BLD VENIPUNCTURE: CPT

## 2019-11-15 PROCEDURE — 86901 BLOOD TYPING SEROLOGIC RH(D): CPT

## 2019-11-15 RX ORDER — CALCIUM GLUCONATE 100 MG/ML
2 VIAL (ML) INTRAVENOUS ONCE
Refills: 0 | Status: COMPLETED | OUTPATIENT
Start: 2019-11-15 | End: 2019-11-15

## 2019-11-15 RX ORDER — PIPERACILLIN AND TAZOBACTAM 4; .5 G/20ML; G/20ML
2.25 INJECTION, POWDER, LYOPHILIZED, FOR SOLUTION INTRAVENOUS EVERY 8 HOURS
Refills: 0 | Status: DISCONTINUED | OUTPATIENT
Start: 2019-11-15 | End: 2019-11-17

## 2019-11-15 RX ORDER — DEXTROSE 50 % IN WATER 50 %
50 SYRINGE (ML) INTRAVENOUS ONCE
Refills: 0 | Status: COMPLETED | OUTPATIENT
Start: 2019-11-15 | End: 2019-11-15

## 2019-11-15 RX ORDER — INSULIN HUMAN 100 [IU]/ML
10 INJECTION, SOLUTION SUBCUTANEOUS ONCE
Refills: 0 | Status: COMPLETED | OUTPATIENT
Start: 2019-11-15 | End: 2019-11-15

## 2019-11-15 RX ORDER — ALBUTEROL 90 UG/1
1.25 AEROSOL, METERED ORAL ONCE
Refills: 0 | Status: COMPLETED | OUTPATIENT
Start: 2019-11-15 | End: 2019-11-15

## 2019-11-15 RX ORDER — LACTULOSE 10 G/15ML
200 SOLUTION ORAL ONCE
Refills: 0 | Status: COMPLETED | OUTPATIENT
Start: 2019-11-15 | End: 2019-11-15

## 2019-11-15 RX ORDER — VANCOMYCIN HCL 1 G
1000 VIAL (EA) INTRAVENOUS ONCE
Refills: 0 | Status: COMPLETED | OUTPATIENT
Start: 2019-11-15 | End: 2019-11-15

## 2019-11-15 RX ORDER — LACTULOSE 10 G/15ML
15 SOLUTION ORAL ONCE
Refills: 0 | Status: COMPLETED | OUTPATIENT
Start: 2019-11-15 | End: 2019-11-15

## 2019-11-15 RX ORDER — PIPERACILLIN AND TAZOBACTAM 4; .5 G/20ML; G/20ML
2.25 INJECTION, POWDER, LYOPHILIZED, FOR SOLUTION INTRAVENOUS ONCE
Refills: 0 | Status: COMPLETED | OUTPATIENT
Start: 2019-11-15 | End: 2019-11-15

## 2019-11-15 RX ORDER — ALBUMIN HUMAN 25 %
250 VIAL (ML) INTRAVENOUS
Refills: 0 | Status: COMPLETED | OUTPATIENT
Start: 2019-11-15 | End: 2019-11-15

## 2019-11-15 RX ORDER — DEXMEDETOMIDINE HYDROCHLORIDE IN 0.9% SODIUM CHLORIDE 4 UG/ML
0.5 INJECTION INTRAVENOUS
Qty: 200 | Refills: 0 | Status: DISCONTINUED | OUTPATIENT
Start: 2019-11-15 | End: 2019-11-15

## 2019-11-15 RX ORDER — FUROSEMIDE 40 MG
100 TABLET ORAL ONCE
Refills: 0 | Status: COMPLETED | OUTPATIENT
Start: 2019-11-15 | End: 2019-11-15

## 2019-11-15 RX ORDER — MANNITOL
100 POWDER (GRAM) MISCELLANEOUS ONCE
Refills: 0 | Status: COMPLETED | OUTPATIENT
Start: 2019-11-15 | End: 2019-11-15

## 2019-11-15 RX ORDER — MANNITOL
50 POWDER (GRAM) MISCELLANEOUS
Qty: 100 | Refills: 0 | Status: DISCONTINUED | OUTPATIENT
Start: 2019-11-15 | End: 2019-11-15

## 2019-11-15 RX ADMIN — Medication 125 MILLILITER(S): at 01:58

## 2019-11-15 RX ADMIN — PIPERACILLIN AND TAZOBACTAM 200 GRAM(S): 4; .5 INJECTION, POWDER, LYOPHILIZED, FOR SOLUTION INTRAVENOUS at 21:17

## 2019-11-15 RX ADMIN — SODIUM CHLORIDE 3 MILLILITER(S): 9 INJECTION INTRAMUSCULAR; INTRAVENOUS; SUBCUTANEOUS at 21:18

## 2019-11-15 RX ADMIN — Medication 250 MILLIGRAM(S): at 20:02

## 2019-11-15 RX ADMIN — Medication 5 MG/HR: at 10:43

## 2019-11-15 RX ADMIN — Medication 50 MILLILITER(S): at 08:20

## 2019-11-15 RX ADMIN — SODIUM CHLORIDE 3 MILLILITER(S): 9 INJECTION INTRAMUSCULAR; INTRAVENOUS; SUBCUTANEOUS at 05:27

## 2019-11-15 RX ADMIN — Medication 100 MILLIGRAM(S): at 10:28

## 2019-11-15 RX ADMIN — HEPARIN SODIUM 7500 UNIT(S): 5000 INJECTION INTRAVENOUS; SUBCUTANEOUS at 21:39

## 2019-11-15 RX ADMIN — Medication 200 GRAM(S): at 08:18

## 2019-11-15 RX ADMIN — LACTULOSE 15 GRAM(S): 10 SOLUTION ORAL at 11:39

## 2019-11-15 RX ADMIN — LACTULOSE 200 GRAM(S): 10 SOLUTION ORAL at 04:14

## 2019-11-15 RX ADMIN — SODIUM CHLORIDE 3 MILLILITER(S): 9 INJECTION INTRAMUSCULAR; INTRAVENOUS; SUBCUTANEOUS at 15:15

## 2019-11-15 RX ADMIN — Medication 200 GRAM(S): at 02:43

## 2019-11-15 RX ADMIN — Medication 250 GM/HR: at 06:50

## 2019-11-15 RX ADMIN — DEXMEDETOMIDINE HYDROCHLORIDE IN 0.9% SODIUM CHLORIDE 13.61 MICROGRAM(S)/KG/HR: 4 INJECTION INTRAVENOUS at 05:27

## 2019-11-15 RX ADMIN — Medication 250 GRAM(S): at 00:19

## 2019-11-15 RX ADMIN — INSULIN HUMAN 10 UNIT(S): 100 INJECTION, SOLUTION SUBCUTANEOUS at 08:19

## 2019-11-15 RX ADMIN — Medication 125 MILLILITER(S): at 02:30

## 2019-11-15 RX ADMIN — PANTOPRAZOLE SODIUM 40 MILLIGRAM(S): 20 TABLET, DELAYED RELEASE ORAL at 11:51

## 2019-11-15 RX ADMIN — ALBUTEROL 1.25 MILLIGRAM(S): 90 AEROSOL, METERED ORAL at 08:23

## 2019-11-15 RX ADMIN — HEPARIN SODIUM 7500 UNIT(S): 5000 INJECTION INTRAVENOUS; SUBCUTANEOUS at 05:29

## 2019-11-15 RX ADMIN — Medication 5 MG/HR: at 00:09

## 2019-11-15 RX ADMIN — HEPARIN SODIUM 7500 UNIT(S): 5000 INJECTION INTRAVENOUS; SUBCUTANEOUS at 15:18

## 2019-11-15 NOTE — PROGRESS NOTE ADULT - SUBJECTIVE AND OBJECTIVE BOX
INTERVAL HPI/OVERNIGHT EVENTS:    11/6 OPCAB x 3  EF normal     11/14     60yo Male Hx HTN, HLD, obesity, psorasis, joyce, CAD/cystectomy and known CAD s/p PTCA/RADHA LAD & D1 presented to cardiologist with CCS class II symptoms. Cardiac cath was performed revealing 3-vessel CAD. On 11/6 pt underwent OPCAB x 3 EF 45%. OR course was uncomplicated and patient arrived to the [facility] extubated. He was noted to have an airleak in his CT on POD 1. His mediastinal and left pleural chest tube were placed to water seal on POD 2. CXR on POD 3 + small apical ptx and pleural tube was placed back on suction, mediastinal tube was clamped. CXR revealed re-expansion of the lung and mediastinal tube removed at the bedside, f/u CXR with small left apical pnuemothorax. On POD 4 the L pleural CT was placed on waterseal with a f/u CXR showing small left apical pnuemothorax again. On POD 5, the CT remained on suction throughout the day and through the night. POD#6, Ct was removed and pt was later discharged. Today on 11/14/19, pt presented to [facility] c/o severe anxiety and SOB. ED labs pertinent for K >6, BUN/Cr 36/3.22, AST/ALT >5000, Lactate 13 and bleeding was noted from sternotomy incision. As a result pt was transferred to CTICU emergently for evaluation.     On presentation pt c/o SOB, incisional pain and sternal bleeding. Pt denied CP, abd pain, urinary complaints, dizziness or syncope. (14 Nov 2019 18:31)        PAST MEDICAL & SURGICAL HISTORY:  Presence of stent in LAD coronary artery  CAD in native artery  Psoriasis  Obesity  HLD (hyperlipidemia)  HTN (hypertension)  S/P CABG (coronary artery bypass graft)  H/O arthroscopy of knee  S/P cholecystectomy        ICU Vital Signs Last 24 Hrs  T(C): 36.4 (15 Nov 2019 05:01), Max: 37.1 (15 Nov 2019 01:01)  T(F): 97.6 (15 Nov 2019 05:01), Max: 98.8 (15 Nov 2019 01:01)  HR: 64 (15 Nov 2019 07:00) (64 - 81)  BP: 132/76 (15 Nov 2019 01:00) (129/68 - 138/74)  BP(mean): 96 (15 Nov 2019 01:00) (90 - 96)  ABP: 126/80 (15 Nov 2019 07:00) (118/68 - 156/82)  ABP(mean): 98 (15 Nov 2019 07:00) (84 - 108)  RR: 30 (15 Nov 2019 07:00) (18 - 75)  SpO2: 98% (15 Nov 2019 07:00) (91% - 100%)    Qtts:     I&O's Summary    14 Nov 2019 07:01  -  15 Nov 2019 07:00  --------------------------------------------------------  IN: 3865.8 mL / OUT: 855 mL / NET: 3010.8 mL    15 Nov 2019 07:01  -  15 Nov 2019 08:25  --------------------------------------------------------  IN: 110.3 mL / OUT: 0 mL / NET: 110.3 mL            Physical Exam    Heart  Lungs  Abd  Ext  Chest  Neuro  Skin    LABS:                        9.0    20.47 )-----------( 161      ( 15 Nov 2019 06:23 )             27.6     11-15    121<L>  |  90<L>  |  54<H>  ----------------------------<  105<H>  6.9<HH>   |  16<L>  |  4.43<H>    Ca    7.7<L>      15 Nov 2019 06:23  Phos  6.4     11-15  Mg     2.3     11-15    TPro  5.2<L>  /  Alb  3.2<L>  /  TBili  2.5<H>  /  DBili  x   /  AST  >7000<H>  /  ALT  >7000<H>  /  AlkPhos  127<H>  11-15    PT/INR - ( 15 Nov 2019 06:23 )   PT: 28.4 sec;   INR: 2.44          PTT - ( 15 Nov 2019 06:23 )  PTT:35.1 sec    ABG - ( 15 Nov 2019 06:22 )  pH, Arterial: 7.34  pH, Blood: x     /  pCO2: 34    /  pO2: 105   / HCO3: 18    / Base Excess: -6.8  /  SaO2: 97                  RADIOLOGY & ADDITIONAL STUDIES:    I have spent/provided stated minutes of critical care time to this patient: INTERVAL HPI/OVERNIGHT EVENTS:    11/6 OPCAB x 3  EF 45%     11/14     60yo Male Hx HTN, HLD, obesity, psorasis, joyce, CAD/RADHA, chol with anginal sxs - s/p OPCAB x 3 11/6.     OR course uncomplicated.   small persistent apical PTX  post-op  - waterseal/clamp trials performed then ultimately removed D#6    presented to [place] 11/14 with SOB/severe anxiety.     (+)ARF/Hyperkalemia (K>6) and acute liver injury - AST/ALT >7K Lactate 13  Bleeding noted from sternal incision    Hemodynamically stable - not tachycardic    emergently transferred to [facility]    ECHO 11/14:  Nl RV/LV size/function, Mild AR, Inter-atrial septal aneurysm. moderate circumferential pericardial effusion  No echocardiographic evidence of tamponade.    sternal incision opened at bedside - estimated 700cc out per d/w CTS with noted improvement in patient color  wound vac placed    multiple diuretic dosings given for Hyperkalemia  HD line placed right femoral anticipating possible need for HD    remains on HFNC 50/50          PAST MEDICAL & SURGICAL HISTORY:  Presence of stent in LAD coronary artery  CAD in native artery  Psoriasis  Obesity  HLD (hyperlipidemia)  HTN (hypertension)  S/P CABG (coronary artery bypass graft)  H/O arthroscopy of knee  S/P cholecystectomy        ICU Vital Signs Last 24 Hrs  T(C): 36.4 (15 Nov 2019 05:01), Max: 37.1 (15 Nov 2019 01:01)  T(F): 97.6 (15 Nov 2019 05:01), Max: 98.8 (15 Nov 2019 01:01)  HR: 64 (15 Nov 2019 07:00) (64 - 81)  BP: 132/76 (15 Nov 2019 01:00) (129/68 - 138/74)  BP(mean): 96 (15 Nov 2019 01:00) (90 - 96)  ABP: 126/80 (15 Nov 2019 07:00) (118/68 - 156/82)  ABP(mean): 98 (15 Nov 2019 07:00) (84 - 108)  RR: 30 (15 Nov 2019 07:00) (18 - 75)  SpO2: 98% (15 Nov 2019 07:00) (91% - 100%)    Qtts:     I&O's Summary    14 Nov 2019 07:01  -  15 Nov 2019 07:00  --------------------------------------------------------  IN: 3865.8 mL / OUT: 855 mL / NET: 3010.8 mL    15 Nov 2019 07:01  -  15 Nov 2019 08:25  --------------------------------------------------------  IN: 110.3 mL / OUT: 0 mL / NET: 110.3 mL            Physical Exam    Heart  Lungs  Abd  Ext  Chest  Neuro  Skin    LABS:                        9.0    20.47 )-----------( 161      ( 15 Nov 2019 06:23 )             27.6     11-15    121<L>  |  90<L>  |  54<H>  ----------------------------<  105<H>  6.9<HH>   |  16<L>  |  4.43<H>    Ca    7.7<L>      15 Nov 2019 06:23  Phos  6.4     11-15  Mg     2.3     11-15    TPro  5.2<L>  /  Alb  3.2<L>  /  TBili  2.5<H>  /  DBili  x   /  AST  >7000<H>  /  ALT  >7000<H>  /  AlkPhos  127<H>  11-15    PT/INR - ( 15 Nov 2019 06:23 )   PT: 28.4 sec;   INR: 2.44          PTT - ( 15 Nov 2019 06:23 )  PTT:35.1 sec    ABG - ( 15 Nov 2019 06:22 )  pH, Arterial: 7.34  pH, Blood: x     /  pCO2: 34    /  pO2: 105   / HCO3: 18    / Base Excess: -6.8  /  SaO2: 97                  RADIOLOGY & ADDITIONAL STUDIES:    I have spent/provided stated minutes of critical care time to this patient: INTERVAL HPI/OVERNIGHT EVENTS:    11/6 OPCAB x 3  EF 45%     11/14 sternal wound opening/vac placement; pericardial effusion drained    58yo Male Hx HTN, HLD, obesity, psorasis, joyce, CAD/RADHA, chol with anginal sxs - s/p OPCAB x 3 11/6.     OR course uncomplicated.   small persistent apical PTX  post-op  - waterseal/clamp trials performed then ultimately removed D#6    presented to [place] 11/14 with SOB/severe anxiety.     (+)ARF/Hyperkalemia (K>6) and acute liver injury - AST/ALT >7K Lactate 13  Bleeding noted from sternal incision    Hemodynamically stable - not tachycardic    emergently transferred to [facility] 11/14:  Nl RV/LV size/function, Mild AR, Inter-atrial septal aneurysm. moderate circumferential pericardial effusion  No echocardiographic evidence of tamponade.    sternal incision opened at bedside - estimated 700cc out per d/w CTS with noted improvement in patient color  wound vac placed    multiple diuretic dosings and on lasix infusion; calcium/bicarb IVP; kayexelate given for Hyperkalemia  HD line placed right femoral anticipating possible need for HD    remains on HFNC 50/50  precedex overnight for confusion - stopped this am   renal consulted Eric and present at bedside      PMHx includes but is not limited to:   CAD/Stent/CABG  Psoriasis  Obesity  HLD (hyperlipidemia)  HTN (hypertension)  H/O arthroscopy of knee  S/P cholecystectomy    ICU Vital Signs Last 24 Hrs  T(C): 36.4 (15 Nov 2019 05:01), Max: 37.1 (15 Nov 2019 01:01)  T(F): 97.6 (15 Nov 2019 05:01), Max: 98.8 (15 Nov 2019 01:01)  HR: 64 (15 Nov 2019 07:00) (64 - 81) sinus  BP: 132/76 (15 Nov 2019 01:00) (129/68 - 138/74)  BP(mean): 96 (15 Nov 2019 01:00) (90 - 96)  ABP: 126/80 (15 Nov 2019 07:00) (118/68 - 156/82)  ABP(mean): 98 (15 Nov 2019 07:00) (84 - 108)  SpO2: 98% (15 Nov 2019 07:00) (91% - 100%) HFNC 50/50    Qtts:   Lasix 40mg/h  Vasopressin - off at this time    I&O's Summary    14 Nov 2019 07:01  -  15 Nov 2019 07:00  --------------------------------------------------------  IN: 3865.8 mL / OUT: 855 mL / NET: 3010.8 mL    15 Nov 2019 07:01  -  15 Nov 2019 08:25  --------------------------------------------------------  IN: 110.3 mL / OUT: 0 mL / NET: 110.3 mL    Physical Exam    Heart - regular (-)rub/gallop appreciated  Lungs - CTA anterior (-)rub/gallop  Abd - (+)BS soft NTND (-)r/r/g  Ext - warm to touch; (+)2 palpable peripheral pulses - no cyanosis/clubbing  Chest - mepelex replaced on superior portion of incision above retention suture (x 2) and vac - vac now with good seal   Neuro - pupils reactive to light; moving all extremities - non-focal   Skin - no rash     LABS:                        9.0    20.47 )-----------( 161      ( 15 Nov 2019 06:23 )             27.6     11-15    121<L>  |  90<L>  |  54<H>  ----------------------------<  105<H>  6.9<HH>   |  16<L>  |  4.43<H>    Ca    7.7<L>      15 Nov 2019 06:23  Phos  6.4     11-15  Mg     2.3     11-15    TPro  5.2<L>  /  Alb  3.2<L>  /  TBili  2.5<H>  /  DBili  x   /  AST  >7000<H>  /  ALT  >7000<H>  /  AlkPhos  127<H>  11-15    PT/INR - ( 15 Nov 2019 06:23 )   PT: 28.4 sec;   INR: 2.44     PTT - ( 15 Nov 2019 06:23 )  PTT:35.1 sec    ABG - ( 15 Nov 2019 06:22 ) 7.34/34/105/97    RADIOLOGY & ADDITIONAL STUDIES: reviewed     Patient with recent OPCAB discharged home presenting to OSH with shock state - ARF/acute liver injury and hyperkalemia with sternal drainage s/p local opening of chest incision with estimated 700c out     1. CV  hemodynamically stable  sinus rhythm   LA normalized (peak 7.2)  ASA/Plavix - HOLD STATIN (LFT)  fluid drained sent for analysis - gram stain bland; Cx (-)to date    2. Renal   refractory hyperkalemia with worsening acidosis trend  on lasix infusion and given significant bolus and diuril - not clearing  given calcium/dextrose-insulin and neb this am (K 6.9) pending stat repeat 6.4  renal (Eric) and at bedside evaluating - plan HD this am - HD now at bedside - femoral cath in place  serial labs thereafter  d/c lasix infusion   HypoNatremia - Vaso may have been a contributing factor - to monitor    renally adjust meds    3. Pulm   remains on HFNC  p02 105    4. GI  protonix   LFT >7000  review of meds and d/c all non-vital meds  would consider sono/doppler of renal vasculature  cont trend LFT    DVT and GI prophylaxis    d/w family present at bedside, staff, renal attending and CTS    I have spent/provided stated minutes of critical care time to this patient: 2 hour INTERVAL HPI/OVERNIGHT EVENTS:    11/6 OPCAB x 3  EF 45%     11/14 sternal wound opening/vac placement; pericardial effusion drained    58yo Male Hx HTN, HLD, obesity, psorasis, joyce, CAD/RADHA, chol with anginal sxs - s/p OPCAB x 3 11/6.     OR course uncomplicated.   small persistent apical PTX  post-op  - waterseal/clamp trials performed then ultimately removed D#6    presented to [place] 11/14 with SOB/severe anxiety.     (+)ARF/Hyperkalemia (K>6) and acute liver injury - AST/ALT >7K Lactate 13  Bleeding noted from sternal incision    Hemodynamically stable - not tachycardic    emergently transferred to [facility] 11/14:  Nl RV/LV size/function, Mild AR, Inter-atrial septal aneurysm. moderate circumferential pericardial effusion  No echocardiographic evidence of tamponade.    sternal incision opened at bedside - estimated 700cc out per d/w CTS with noted improvement in patient color  wound vac placed    multiple diuretic dosings and on lasix infusion; calcium/bicarb IVP; kayexelate given for Hyperkalemia  HD line placed right femoral anticipating possible need for HD    remains on HFNC 50/50  precedex overnight for confusion - stopped this am   renal consulted Eric and present at bedside      PMHx includes but is not limited to:   CAD/Stent/CABG  Psoriasis  Obesity  HLD (hyperlipidemia)  HTN (hypertension)  H/O arthroscopy of knee  S/P cholecystectomy    ICU Vital Signs Last 24 Hrs  T(C): 36.4 (15 Nov 2019 05:01), Max: 37.1 (15 Nov 2019 01:01)  T(F): 97.6 (15 Nov 2019 05:01), Max: 98.8 (15 Nov 2019 01:01)  HR: 64 (15 Nov 2019 07:00) (64 - 81) sinus  BP: 132/76 (15 Nov 2019 01:00) (129/68 - 138/74)  BP(mean): 96 (15 Nov 2019 01:00) (90 - 96)  ABP: 126/80 (15 Nov 2019 07:00) (118/68 - 156/82)  ABP(mean): 98 (15 Nov 2019 07:00) (84 - 108)  SpO2: 98% (15 Nov 2019 07:00) (91% - 100%) HFNC 50/50    Qtts:   Lasix 40mg/h  Vasopressin - off at this time    I&O's Summary    14 Nov 2019 07:01  -  15 Nov 2019 07:00  --------------------------------------------------------  IN: 3865.8 mL / OUT: 855 mL / NET: 3010.8 mL    15 Nov 2019 07:01  -  15 Nov 2019 08:25  --------------------------------------------------------  IN: 110.3 mL / OUT: 0 mL / NET: 110.3 mL    Physical Exam    Heart - regular (-)rub/gallop appreciated  Lungs - CTA anterior (-)rub/gallop  Abd - (+)BS soft NTND (-)r/r/g  Ext - warm to touch; (+)2 palpable peripheral pulses - no cyanosis/clubbing  Chest - mepelex replaced on superior portion of incision above retention suture (x 2) and vac - vac now with good seal   Neuro - pupils reactive to light; moving all extremities - non-focal   Skin - no rash     LABS:                        9.0    20.47 )-----------( 161      ( 15 Nov 2019 06:23 )             27.6     11-15    121<L>  |  90<L>  |  54<H>  ----------------------------<  105<H>  6.9<HH>   |  16<L>  |  4.43<H>    Ca    7.7<L>      15 Nov 2019 06:23  Phos  6.4     11-15  Mg     2.3     11-15    TPro  5.2<L>  /  Alb  3.2<L>  /  TBili  2.5<H>  /  DBili  x   /  AST  >7000<H>  /  ALT  >7000<H>  /  AlkPhos  127<H>  11-15    PT/INR - ( 15 Nov 2019 06:23 )   PT: 28.4 sec;   INR: 2.44     PTT - ( 15 Nov 2019 06:23 )  PTT:35.1 sec    ABG - ( 15 Nov 2019 06:22 ) 7.34/34/105/97    RADIOLOGY & ADDITIONAL STUDIES: reviewed     Patient with recent OPCAB discharged home presenting to OSH with shock state - ARF/acute liver injury and hyperkalemia with sternal drainage s/p local opening of chest incision with estimated 700c out     1. CV  hemodynamically stable  sinus rhythm   LA normalized (peak 7.2)  ASA/Plavix - HOLD STATIN (LFT)  fluid drained sent for analysis - gram stain bland; Cx (-)to date    2. Renal   refractory hyperkalemia with worsening acidosis trend  on lasix infusion and given significant bolus and diuril - not clearing  given calcium/dextrose-insulin and neb this am (K 6.9) pending stat repeat 6.4  renal (Eric) and at bedside evaluating - plan HD this am - HD now at bedside - femoral cath in place  serial labs thereafter  d/c lasix infusion   HypoNatremia - Vaso may have been a contributing factor - to monitor    renally adjust meds    3. Pulm   remains on HFNC  p02 105    4. GI  protonix   LFT >7000  review of meds and d/c all non-vital meds  would consider sono/doppler of renal vasculature  cont trend LFT  elevated ammonia level - repeat post-HD  having BM - to consider lactulose    DVT and GI prophylaxis    d/w family present at bedside, staff, renal attending and CTS    I have spent/provided stated minutes of critical care time to this patient: 2 hour

## 2019-11-15 NOTE — CONSULT NOTE ADULT - ATTENDING COMMENTS
reviewed case in detail with CTSICU  team and with Dr. Hammond  also spoke with patient's wife  proceed with HD if K does not improve

## 2019-11-15 NOTE — PROCEDURE NOTE - NSSITEPREP_SKIN_A_CORE
Adherence to aseptic technique: hand hygiene prior to donning barriers (gown, gloves), don cap and mask, sterile drape over patient
Adherence to aseptic technique: hand hygiene prior to donning barriers (gown, gloves), don cap and mask, sterile drape over patient/chlorhexidine

## 2019-11-15 NOTE — PROGRESS NOTE ADULT - SUBJECTIVE AND OBJECTIVE BOX
Patient was seen and evaluated on dialysis.   treatment for acute hyperkalemia  oliguric GAUTAM    HR: 82 (11-15-19 @ 16:00)  BP: 140/85 (11-15-19 @ 16:00)  Wt(kg): --  11-15    126<L>  |  93<L>  |  54<H>  ----------------------------<  89  5.8<H>   |  18<L>  |  4.75<H>    Ca    8.0<L>      15 Nov 2019 16:47  Phos  5.8     11-15  Mg     2.3     11-15    TPro  5.8<L>  /  Alb  3.3  /  TBili  2.9<H>  /  DBili  x   /  AST  6746<H>  /  ALT  >7000  /  AlkPhos  170<H>  11-15    Continue dialysis:   Dialyzer:         QB:  300 ml/min  K bath:  2K+  Goal UF: 0 ml  Duration: 3 hours  Patient is tolerating the procedure well.   Continue full hemodialysis treatment as prescribed.

## 2019-11-15 NOTE — CONSULT NOTE ADULT - ASSESSMENT
58 yo M w PMHx of HTN, HLD, obesity, psoriasis, CAD/RADHA, s/p OPCAB x 3 11/6 presented to [place] 11/14 with SOB and severe anxiety, in shock state, acute renal failure, acute liver injury and hyperkalemia with sternal drainage s/p local opening of chest incision with estimated 700cc out, vac placement; pericardial effusion drained on 11/14. S/p multiple diuretic doses and furosemide drip, calcium/bicarb IVP, kayexelate for refractory hyperkalemia, worsening of GAUTAM, hyponatremia, hence Nephrology consulted.  stable on HFNC 50/50. 60 yo M w PMHx of HTN, HLD, obesity, psoriasis, CAD/RADHA, s/p OPCAB x 3 11/6 presented to [place] 11/14 with SOB and severe anxiety, in shock state, acute renal failure, acute liver injury and hyperkalemia with sternal drainage s/p local opening of chest incision with estimated 700cc out, vac placement; pericardial effusion drained on 11/14. S/p multiple diuretic doses and furosemide drip, calcium/bicarb IVP, kayexelate for refractory hyperkalemia, worsening of GAUTAM, hyponatremia, hence Nephrology consulted.  stable on HFNC 50/50.        # Nonoliguric GAUTAM vs GAUTAM on CKD  - unknown baseline, Cr 0.6 on 11/6 up to 4.4 this morning  - likely ATN in the setting of shock associated with sever metabolic acidosis, hyponatremia, refractory hyperkalemia, acute liver failure  - will anticipate RRT with IHD to provide clearance, meanwhile please check am/random cortisol to r/o adrenal insufficiency, TFT's for thyroid function, CPK, r/o possible hemolysis, get serum osm, urine osm and urine Na, K, Cl, Cr, Urea (pt was on precedex, mannitol, vasopressin overnight that might affect results)   - will consider switching IHD to cvvhd if more hemodynamically unstable or continuous clearance needed   - maintain MAP >65-70 mmHg  - monitor lytes Q/4-6hr  - maintain Singh for strict in/out      Discussed with attending Dr Reyes and primary team. 58 yo M w PMHx of HTN, HLD, obesity, psoriasis, CAD/RADHA, s/p OPCAB x 3 11/6 presented to [place] 11/14 with SOB and severe anxiety, in shock state, acute renal failure, acute liver injury and hyperkalemia with sternal drainage s/p local opening of chest incision with estimated 700cc out, vac placement; pericardial effusion drained on 11/14. S/p multiple diuretic doses and furosemide drip, calcium/bicarb IVP, kayexelate for refractory hyperkalemia, worsening of GAUTAM, hyponatremia, hence Nephrology consulted.  stable on HFNC 50/50.        # Nonoliguric GAUTAM vs GAUTAM on CKD  - unknown baseline, Cr 0.6 on 11/6 up to 4.4 this morning  - likely ATN in the setting of shock associated with sever metabolic acidosis, hyponatremia, refractory hyperkalemia, acute liver failure  - will anticipate RRT with IHD to provide clearance, meanwhile please check am/random cortisol to r/o adrenal insufficiency, TFT's for thyroid function, CPK, r/o possible hemolysis, get serum osm, urine osm and urine Na, K, Cl, Cr, Urea (pt was on precedex, mannitol, vasopressin overnight that might affect results)   - will consider switching IHD to cvvhd if more hemodynamically unstable or continuous clearance needed   - dc furosemide  - maintain MAP >65-70 mmHg  - monitor lytes Q/4-6hr  - maintain Singh for strict in/out      Discussed with attending Dr Reyes and primary team.

## 2019-11-15 NOTE — PROGRESS NOTE ADULT - SUBJECTIVE AND OBJECTIVE BOX
Problems 58yo Male Hx HTN, HLD, obesity, psorasis, joyce, CAD/RADHA, chol with anginal sxs - s/p OPCAB x 3 11/6.   11/6 OPCAB x 3  EF 45%   Discharged 11/12  readmitted on  11/14 with SOB/Anxiety   (+)ARF/Hyperkalemia (K>6) and acute liver injury - AST/ALT >7K Lactate 13  Bleeding noted from sternal incision  ECHO 11/14:  Nl RV/LV size/function, Mild AR, Inter-atrial septal aneurysm. moderate circumferential pericardial effusion  No echocardiographic evidence of tamponade.    sternal incision opened at bedside - estimated 700cc out per d/w CTS with noted improvement in patient color  wound vac placed    S/p emergent/urgent hemodialysis today for persistent acidosis, hyperkalemia  Pt tolarated dialysis well  clinically improve post HD    Problems  1. Cardiogenic shock - presumed obstructive from pericardial effusion/tamponade  2. multiorgan failure    Plan  Neuro -- encephalopathy from liver and renal failure - stable to improved  CVS - hemodynamic support, monitor for arrhythmia.   Pulm -on HF, protecting airway   GI - Acute liver injury - supportive care    - s/p HD for hyperkalemia and acidosis  Endo - glycemic control  Heme - correct coagulapathy, monitor for bleeding  ID - empiric antibiotics.       Critical post op.    Critical care time spent 50 min

## 2019-11-15 NOTE — PROCEDURE NOTE - NSINDICATIONS_GEN_A_CORE
venous access/dialysis/CRRT/critical illness/emergency venous access
arterial puncture to obtain ABG's/monitoring purposes/critical patient

## 2019-11-15 NOTE — CONSULT NOTE ADULT - SUBJECTIVE AND OBJECTIVE BOX
Patient is a 59y old  Male who presents with a chief complaint of sternal bleeding (15 Nov 2019 08:25)      HPI:  60 y/o M with a PMHx of HTN, HLD, obesity, psorasis, cholecystectomy and known CAD s/p PTCA/RADHA LAD & D1 presented to cardiologist with CCS class II symptoms. Cardiac cath was performed revealing 3-vessel CAD. On 11/6 pt underwent OPCAB x 3 EF 45%. OR course was uncomplicated and patient arrived to [facility] extubated. He was noted to have an airleak in his CT on POD 1. His mediastinal and left pleural chest tube were placed to water seal on POD 2. CXR on POD 3 + small apical ptx and pleural tube was placed back on suction, mediastinal tube was clamped. CXR revealed re-expansion of the lung and mediastinal tube removed at the bedside, f/u CXR with small left apical pnuemothorax. On POD 4 the L pleural CT was placed on waterseal with a f/u CXR showing small left apical pnuemothorax again. On POD 5, the CT remained on suction throughout the day and through the night. POD#6, Ct was removed and pt was later discharged. Today on 11/14/19, pt presented to [facility] c/o severe anxiety and SOB. ED labs pertinent for K >6, BUN/Cr 36/3.22, AST/ALT >5000, Lactate 13 and bleeding was noted from sternotomy incision. As a result pt was transferred to CTICU emergently for evaluation.     On presentation pt c/o SOB, incisional pain and sternal bleeding. Pt denied CP, abd pain, urinary complaints, dizziness or syncope. (14 Nov 2019 18:31)      PAST MEDICAL & SURGICAL HISTORY:  Presence of stent in LAD coronary artery  CAD in native artery  Psoriasis  Obesity  HLD (hyperlipidemia)  HTN (hypertension)  S/P CABG (coronary artery bypass graft)  H/O arthroscopy of knee  S/P cholecystectomy      Allergies    No Known Allergies    Intolerances        FAMILY HISTORY:  FH: heart disease      SOCIAL HISTORY:      MEDICATIONS  (STANDING):  aspirin enteric coated 81 milliGRAM(s) Oral daily  clopidogrel Tablet 75 milliGRAM(s) Oral daily  furosemide Infusion 10 mG/Hr (5 mL/Hr) IV Continuous <Continuous>  heparin  Injectable 7500 Unit(s) SubCutaneous every 8 hours  pantoprazole  Injectable 40 milliGRAM(s) IV Push daily  sodium chloride 0.9% lock flush 3 milliLiter(s) IV Push every 8 hours  sodium chloride 0.9%. 1000 milliLiter(s) (75 mL/Hr) IV Continuous <Continuous>  vasopressin Infusion 0.03 Unit(s)/Min (1.8 mL/Hr) IV Continuous <Continuous>    MEDICATIONS  (PRN):      Vital Signs Last 24 Hrs  T(C): 36.7 (15 Nov 2019 09:00), Max: 37.1 (15 Nov 2019 01:01)  T(F): 98 (15 Nov 2019 09:00), Max: 98.8 (15 Nov 2019 01:01)  HR: 74 (15 Nov 2019 09:50) (64 - 81)  BP: 147/59 (15 Nov 2019 09:50) (129/68 - 147/59)  BP(mean): 82 (15 Nov 2019 09:50) (82 - 96)  RR: 71 (15 Nov 2019 09:50) (18 - 75)  SpO2: 97% (15 Nov 2019 09:50) (91% - 100%)    REVIEW OF SYSTEMS:  not able to obtain     PHYSICAL EXAM:  GENERAL: alert, no acute distress at present  NECK: supple, No JVD  CHEST/LUNG: equal breath sounds bilateral  HEART: normal S1S2, RRR  ABDOMEN: Soft, Nontender, Distended, +BS, No flank tenderness  EXTREMITIES: No peripheral edema, no calf tenderness bilateral  Neurology: no focal neurological deficit  SKIN: No rashes  ACCESS: R femoral HD cath, site dry and clean      CAPILLARY BLOOD GLUCOSE      POCT Blood Glucose.: 105 mg/dL (15 Nov 2019 07:56)      I&O's Summary    14 Nov 2019 07:01  -  15 Nov 2019 07:00  --------------------------------------------------------  IN: 3865.8 mL / OUT: 855 mL / NET: 3010.8 mL    15 Nov 2019 07:01  -  15 Nov 2019 10:03  --------------------------------------------------------  IN: 290.3 mL / OUT: 110 mL / NET: 180.3 mL          LABS:                            9.0    20.47 )-----------( 161      ( 15 Nov 2019 06:23 )             27.6       PT/INR - ( 15 Nov 2019 06:23 )   PT: 28.4 sec;   INR: 2.44          PTT - ( 15 Nov 2019 06:23 )  PTT:35.1 sec          RADIOLOGY & ADDITIONAL TESTS: Patient is a 59y old  Male who presents with a chief complaint of sternal bleeding (15 Nov 2019 08:25)    Nephrology consulted for GAUTAM, hyperkalemia, acidosis.    HPI:  60 y/o M with a PMHx of HTN, HLD, obesity, psorasis, cholecystectomy and known CAD s/p PTCA/RADHA LAD & D1 presented to cardiologist with CCS class II symptoms. Cardiac cath was performed revealing 3-vessel CAD. On 11/6 pt underwent OPCAB x 3 EF 45%. OR course was uncomplicated and patient arrived [facility] ext[facility]. He was noted to have an airleak in his CT on POD 1. His mediastinal and left pleural chest tube were placed to water seal on POD 2. CXR on POD 3 + small apical ptx and pleural tube was placed back on suction, mediastinal tube was clamped. CXR revealed re-expansion of the lung and mediastinal tube removed at the bedside, f/u CXR with small left apical pnuemothorax. On POD 4 the L pleural CT was placed on waterseal with a f/u CXR showing small left apical pnuemothorax again. On POD 5, the CT remained on suction throughout the day and through the night. POD#6, Ct was removed and pt was later discharged. Today on 11/14/19, pt presented to [facility] c/o severe anxiety and SOB. ED labs pertinent for K >6, BUN/Cr 36/3.22, AST/ALT >5000, Lactate 13 and bleeding was noted from sternotomy incision. As a result pt was transferred to CTICU emergently for evaluation.     On presentation pt c/o SOB, incisional pain and sternal bleeding. Pt denied CP, abd pain, urinary complaints, dizziness or syncope. (14 Nov 2019 18:31)      PAST MEDICAL & SURGICAL HISTORY:  Presence of stent in LAD coronary artery  CAD in native artery  Psoriasis  Obesity  HLD (hyperlipidemia)  HTN (hypertension)  S/P CABG (coronary artery bypass graft)  H/O arthroscopy of knee  S/P cholecystectomy      Allergies    No Known Allergies    Intolerances        FAMILY HISTORY:  FH: heart disease      SOCIAL HISTORY:  na    MEDICATIONS  (STANDING):  aspirin enteric coated 81 milliGRAM(s) Oral daily  clopidogrel Tablet 75 milliGRAM(s) Oral daily  furosemide Infusion 10 mG/Hr (5 mL/Hr) IV Continuous <Continuous>  heparin  Injectable 7500 Unit(s) SubCutaneous every 8 hours  pantoprazole  Injectable 40 milliGRAM(s) IV Push daily  sodium chloride 0.9% lock flush 3 milliLiter(s) IV Push every 8 hours  sodium chloride 0.9%. 1000 milliLiter(s) (75 mL/Hr) IV Continuous <Continuous>  vasopressin Infusion 0.03 Unit(s)/Min (1.8 mL/Hr) IV Continuous <Continuous>    MEDICATIONS  (PRN):      Vital Signs Last 24 Hrs  T(C): 36.7 (15 Nov 2019 09:00), Max: 37.1 (15 Nov 2019 01:01)  T(F): 98 (15 Nov 2019 09:00), Max: 98.8 (15 Nov 2019 01:01)  HR: 74 (15 Nov 2019 09:50) (64 - 81)  BP: 147/59 (15 Nov 2019 09:50) (129/68 - 147/59)  BP(mean): 82 (15 Nov 2019 09:50) (82 - 96)  RR: 71 (15 Nov 2019 09:50) (18 - 75)  SpO2: 97% (15 Nov 2019 09:50) (91% - 100%)    REVIEW OF SYSTEMS:  not able to obtain     PHYSICAL EXAM:  GENERAL: confused, no acute distress at present  NECK: No JVD appreciated  CHEST/LUNG: equal breath sounds bilateral  HEART: normal S1S2, RRR  ABDOMEN: Soft, Nontender, Distended, +BS, No flank tenderness  EXTREMITIES: No peripheral edema, no calf tenderness bilateral  Neurology: confused, no focal neurological deficit  SKIN: No rashes  ACCESS: R femoral HD cath, site dry and clean      CAPILLARY BLOOD GLUCOSE      POCT Blood Glucose.: 105 mg/dL (15 Nov 2019 07:56)      I&O's Summary    14 Nov 2019 07:01  -  15 Nov 2019 07:00  --------------------------------------------------------  IN: 3865.8 mL / OUT: 855 mL / NET: 3010.8 mL    15 Nov 2019 07:01  -  15 Nov 2019 10:03  --------------------------------------------------------  IN: 290.3 mL / OUT: 110 mL / NET: 180.3 mL          LABS:                            9.0    20.47 )-----------( 161      ( 15 Nov 2019 06:23 )             27.6       PT/INR - ( 15 Nov 2019 06:23 )   PT: 28.4 sec;   INR: 2.44          PTT - ( 15 Nov 2019 06:23 )  PTT:35.1 sec          RADIOLOGY & ADDITIONAL TESTS:    < from: Xray Chest 1 View AP/PA (11.14.19 @ 16:52) >    EXAM:  XR CHEST AP OR PA 1V                          PROCEDURE DATE:  11/14/2019          INTERPRETATION:  Clinical History: Abnormal chest sounds    Portable examination of the chest demonstrates cardiomegaly. Discoid   change and/or infiltrate left lung base. Status post sternotomy. Limited   inspiration.    Impression: Discoid change and/or infiltrate left lung base    < end of copied text >

## 2019-11-15 NOTE — PROCEDURE NOTE - NSPROCDETAILS_GEN_ALL_CORE
guidewire recovered/sterile technique, catheter placed/lumen(s) aspirated and flushed/sterile dressing applied/ultrasound guidance
hemostasis with direct pressure, dressing applied/ultrasound guidance/positive blood return obtained via catheter/Seldinger technique/all materials/supplies accounted for at end of procedure/connected to a pressurized flush line/location identified, draped/prepped, sterile technique used, needle inserted/introduced

## 2019-11-16 DIAGNOSIS — N17.9 ACUTE KIDNEY FAILURE, UNSPECIFIED: ICD-10-CM

## 2019-11-16 LAB
ALBUMIN SERPL ELPH-MCNC: 3 G/DL — LOW (ref 3.3–5)
ALBUMIN SERPL ELPH-MCNC: 3.1 G/DL — LOW (ref 3.3–5)
ALBUMIN SERPL ELPH-MCNC: 3.2 G/DL — LOW (ref 3.3–5)
ALP SERPL-CCNC: 181 U/L — HIGH (ref 40–120)
ALP SERPL-CCNC: 189 U/L — HIGH (ref 40–120)
ALP SERPL-CCNC: 223 U/L — HIGH (ref 40–120)
ALT FLD-CCNC: 5743 U/L — HIGH (ref 10–45)
ALT FLD-CCNC: 5915 U/L — HIGH (ref 10–45)
ALT FLD-CCNC: >7000 U/L — SIGNIFICANT CHANGE UP (ref 10–45)
AMMONIA BLD-MCNC: 54 UMOL/L — SIGNIFICANT CHANGE UP (ref 11–55)
ANION GAP SERPL CALC-SCNC: 14 MMOL/L — SIGNIFICANT CHANGE UP (ref 5–17)
ANION GAP SERPL CALC-SCNC: 14 MMOL/L — SIGNIFICANT CHANGE UP (ref 5–17)
ANION GAP SERPL CALC-SCNC: 17 MMOL/L — SIGNIFICANT CHANGE UP (ref 5–17)
APTT BLD: 29.9 SEC — SIGNIFICANT CHANGE UP (ref 27.5–36.3)
APTT BLD: 31.3 SEC — SIGNIFICANT CHANGE UP (ref 27.5–36.3)
APTT BLD: 33.3 SEC — SIGNIFICANT CHANGE UP (ref 27.5–36.3)
AST SERPL-CCNC: 1999 U/L — HIGH (ref 10–40)
AST SERPL-CCNC: 2333 U/L — HIGH (ref 10–40)
AST SERPL-CCNC: 3907 U/L — HIGH (ref 10–40)
BILIRUB SERPL-MCNC: 3.2 MG/DL — HIGH (ref 0.2–1.2)
BILIRUB SERPL-MCNC: 3.2 MG/DL — HIGH (ref 0.2–1.2)
BILIRUB SERPL-MCNC: 3.6 MG/DL — HIGH (ref 0.2–1.2)
BUN SERPL-MCNC: 39 MG/DL — HIGH (ref 7–23)
BUN SERPL-MCNC: 44 MG/DL — HIGH (ref 7–23)
BUN SERPL-MCNC: 49 MG/DL — HIGH (ref 7–23)
CALCIUM SERPL-MCNC: 7.6 MG/DL — LOW (ref 8.4–10.5)
CALCIUM SERPL-MCNC: 7.9 MG/DL — LOW (ref 8.4–10.5)
CALCIUM SERPL-MCNC: 7.9 MG/DL — LOW (ref 8.4–10.5)
CHLORIDE SERPL-SCNC: 93 MMOL/L — LOW (ref 96–108)
CHLORIDE SERPL-SCNC: 94 MMOL/L — LOW (ref 96–108)
CHLORIDE SERPL-SCNC: 94 MMOL/L — LOW (ref 96–108)
CO2 SERPL-SCNC: 22 MMOL/L — SIGNIFICANT CHANGE UP (ref 22–31)
CREAT SERPL-MCNC: 4.43 MG/DL — HIGH (ref 0.5–1.3)
CREAT SERPL-MCNC: 5.47 MG/DL — HIGH (ref 0.5–1.3)
CREAT SERPL-MCNC: 5.98 MG/DL — HIGH (ref 0.5–1.3)
GAS PNL BLDA: SIGNIFICANT CHANGE UP
GLUCOSE SERPL-MCNC: 100 MG/DL — HIGH (ref 70–99)
GLUCOSE SERPL-MCNC: 110 MG/DL — HIGH (ref 70–99)
GLUCOSE SERPL-MCNC: 97 MG/DL — SIGNIFICANT CHANGE UP (ref 70–99)
HCT VFR BLD CALC: 28 % — LOW (ref 39–50)
HCT VFR BLD CALC: 30.8 % — LOW (ref 39–50)
HCT VFR BLD CALC: 31.1 % — LOW (ref 39–50)
HGB BLD-MCNC: 10.3 G/DL — LOW (ref 13–17)
HGB BLD-MCNC: 9.3 G/DL — LOW (ref 13–17)
HGB BLD-MCNC: 9.9 G/DL — LOW (ref 13–17)
INR BLD: 1.75 — HIGH (ref 0.88–1.16)
INR BLD: 1.93 — HIGH (ref 0.88–1.16)
INR BLD: 2.11 — HIGH (ref 0.88–1.16)
LACTATE SERPL-SCNC: 1.2 MMOL/L — SIGNIFICANT CHANGE UP (ref 0.5–2)
LACTATE SERPL-SCNC: 1.3 MMOL/L — SIGNIFICANT CHANGE UP (ref 0.5–2)
LACTATE SERPL-SCNC: 1.3 MMOL/L — SIGNIFICANT CHANGE UP (ref 0.5–2)
MAGNESIUM SERPL-MCNC: 2 MG/DL — SIGNIFICANT CHANGE UP (ref 1.6–2.6)
MAGNESIUM SERPL-MCNC: 2 MG/DL — SIGNIFICANT CHANGE UP (ref 1.6–2.6)
MAGNESIUM SERPL-MCNC: 2.1 MG/DL — SIGNIFICANT CHANGE UP (ref 1.6–2.6)
MCHC RBC-ENTMCNC: 29.4 PG — SIGNIFICANT CHANGE UP (ref 27–34)
MCHC RBC-ENTMCNC: 29.9 PG — SIGNIFICANT CHANGE UP (ref 27–34)
MCHC RBC-ENTMCNC: 30.1 PG — SIGNIFICANT CHANGE UP (ref 27–34)
MCHC RBC-ENTMCNC: 32.1 GM/DL — SIGNIFICANT CHANGE UP (ref 32–36)
MCHC RBC-ENTMCNC: 33.1 GM/DL — SIGNIFICANT CHANGE UP (ref 32–36)
MCHC RBC-ENTMCNC: 33.2 GM/DL — SIGNIFICANT CHANGE UP (ref 32–36)
MCV RBC AUTO: 90 FL — SIGNIFICANT CHANGE UP (ref 80–100)
MCV RBC AUTO: 90.9 FL — SIGNIFICANT CHANGE UP (ref 80–100)
MCV RBC AUTO: 91.4 FL — SIGNIFICANT CHANGE UP (ref 80–100)
NRBC # BLD: 0 /100 WBCS — SIGNIFICANT CHANGE UP (ref 0–0)
PHOSPHATE SERPL-MCNC: 5.7 MG/DL — HIGH (ref 2.5–4.5)
PLATELET # BLD AUTO: 152 K/UL — SIGNIFICANT CHANGE UP (ref 150–400)
PLATELET # BLD AUTO: 169 K/UL — SIGNIFICANT CHANGE UP (ref 150–400)
PLATELET # BLD AUTO: 180 K/UL — SIGNIFICANT CHANGE UP (ref 150–400)
POTASSIUM SERPL-MCNC: 4.3 MMOL/L — SIGNIFICANT CHANGE UP (ref 3.5–5.3)
POTASSIUM SERPL-MCNC: 4.4 MMOL/L — SIGNIFICANT CHANGE UP (ref 3.5–5.3)
POTASSIUM SERPL-MCNC: 4.5 MMOL/L — SIGNIFICANT CHANGE UP (ref 3.5–5.3)
POTASSIUM SERPL-SCNC: 4.3 MMOL/L — SIGNIFICANT CHANGE UP (ref 3.5–5.3)
POTASSIUM SERPL-SCNC: 4.4 MMOL/L — SIGNIFICANT CHANGE UP (ref 3.5–5.3)
POTASSIUM SERPL-SCNC: 4.5 MMOL/L — SIGNIFICANT CHANGE UP (ref 3.5–5.3)
PROT SERPL-MCNC: 5.2 G/DL — LOW (ref 6–8.3)
PROT SERPL-MCNC: 5.4 G/DL — LOW (ref 6–8.3)
PROT SERPL-MCNC: 5.8 G/DL — LOW (ref 6–8.3)
PROTHROM AB SERPL-ACNC: 20.1 SEC — HIGH (ref 10–12.9)
PROTHROM AB SERPL-ACNC: 22.2 SEC — HIGH (ref 10–12.9)
PROTHROM AB SERPL-ACNC: 24.4 SEC — HIGH (ref 10–12.9)
RBC # BLD: 3.11 M/UL — LOW (ref 4.2–5.8)
RBC # BLD: 3.37 M/UL — LOW (ref 4.2–5.8)
RBC # BLD: 3.42 M/UL — LOW (ref 4.2–5.8)
RBC # FLD: 14.1 % — SIGNIFICANT CHANGE UP (ref 10.3–14.5)
RBC # FLD: 14.1 % — SIGNIFICANT CHANGE UP (ref 10.3–14.5)
RBC # FLD: 14.2 % — SIGNIFICANT CHANGE UP (ref 10.3–14.5)
SODIUM SERPL-SCNC: 130 MMOL/L — LOW (ref 135–145)
SODIUM SERPL-SCNC: 130 MMOL/L — LOW (ref 135–145)
SODIUM SERPL-SCNC: 132 MMOL/L — LOW (ref 135–145)
WBC # BLD: 21.3 K/UL — HIGH (ref 3.8–10.5)
WBC # BLD: 21.87 K/UL — HIGH (ref 3.8–10.5)
WBC # BLD: 22.51 K/UL — HIGH (ref 3.8–10.5)
WBC # FLD AUTO: 21.3 K/UL — HIGH (ref 3.8–10.5)
WBC # FLD AUTO: 21.87 K/UL — HIGH (ref 3.8–10.5)
WBC # FLD AUTO: 22.51 K/UL — HIGH (ref 3.8–10.5)

## 2019-11-16 PROCEDURE — 97606 NEG PRS WND THER DME>50 SQCM: CPT

## 2019-11-16 PROCEDURE — 71045 X-RAY EXAM CHEST 1 VIEW: CPT | Mod: 26

## 2019-11-16 PROCEDURE — 99292 CRITICAL CARE ADDL 30 MIN: CPT

## 2019-11-16 PROCEDURE — 99233 SBSQ HOSP IP/OBS HIGH 50: CPT

## 2019-11-16 PROCEDURE — 99291 CRITICAL CARE FIRST HOUR: CPT

## 2019-11-16 PROCEDURE — 99233 SBSQ HOSP IP/OBS HIGH 50: CPT | Mod: GC

## 2019-11-16 RX ORDER — HEPARIN SODIUM 5000 [USP'U]/ML
7500 INJECTION INTRAVENOUS; SUBCUTANEOUS EVERY 8 HOURS
Refills: 0 | Status: DISCONTINUED | OUTPATIENT
Start: 2019-11-16 | End: 2019-11-25

## 2019-11-16 RX ADMIN — SODIUM CHLORIDE 3 MILLILITER(S): 9 INJECTION INTRAMUSCULAR; INTRAVENOUS; SUBCUTANEOUS at 06:34

## 2019-11-16 RX ADMIN — PIPERACILLIN AND TAZOBACTAM 200 GRAM(S): 4; .5 INJECTION, POWDER, LYOPHILIZED, FOR SOLUTION INTRAVENOUS at 22:52

## 2019-11-16 RX ADMIN — Medication 81 MILLIGRAM(S): at 13:50

## 2019-11-16 RX ADMIN — HEPARIN SODIUM 5000 UNIT(S): 5000 INJECTION INTRAVENOUS; SUBCUTANEOUS at 22:52

## 2019-11-16 RX ADMIN — PIPERACILLIN AND TAZOBACTAM 200 GRAM(S): 4; .5 INJECTION, POWDER, LYOPHILIZED, FOR SOLUTION INTRAVENOUS at 13:49

## 2019-11-16 RX ADMIN — SODIUM CHLORIDE 3 MILLILITER(S): 9 INJECTION INTRAMUSCULAR; INTRAVENOUS; SUBCUTANEOUS at 22:53

## 2019-11-16 RX ADMIN — SODIUM CHLORIDE 3 MILLILITER(S): 9 INJECTION INTRAMUSCULAR; INTRAVENOUS; SUBCUTANEOUS at 19:11

## 2019-11-16 RX ADMIN — HEPARIN SODIUM 5000 UNIT(S): 5000 INJECTION INTRAVENOUS; SUBCUTANEOUS at 13:49

## 2019-11-16 RX ADMIN — CLOPIDOGREL BISULFATE 75 MILLIGRAM(S): 75 TABLET, FILM COATED ORAL at 13:50

## 2019-11-16 RX ADMIN — PIPERACILLIN AND TAZOBACTAM 200 GRAM(S): 4; .5 INJECTION, POWDER, LYOPHILIZED, FOR SOLUTION INTRAVENOUS at 06:39

## 2019-11-16 RX ADMIN — PANTOPRAZOLE SODIUM 40 MILLIGRAM(S): 20 TABLET, DELAYED RELEASE ORAL at 13:50

## 2019-11-16 NOTE — PROGRESS NOTE ADULT - ATTENDING COMMENTS
I independently performed the key portions of the evaluation and management service provided. I agree with the above history, physical, and plan which I have reviewed and edited where appropriate. I find dialyzed yesterday, hyperkalemia improved. Follow SCr. Follow K. Keep MAP > 65.  See full note. (Patient seen earlier in day.)

## 2019-11-16 NOTE — PROGRESS NOTE ADULT - SUBJECTIVE AND OBJECTIVE BOX
Pt seen and examined at bedside.  Per Dr. Hammond request, wound vac removed.  Previous mattress sutures removed.  Wound opened  caudally 4cm.  New horizontal mattress sutures placed  to prevent wound from opening beyond desired amount.  Wound edges clean, no signs of infection, no purulence, no erythema.  Wound measures 12cm x 2cm (wide) x 2.5 cm (deepest).  No sternal wires exposed.  New wound vac re-applyed.

## 2019-11-16 NOTE — PHYSICAL THERAPY INITIAL EVALUATION ADULT - ACTIVE RANGE OF MOTION EXAMINATION, REHAB EVAL
bilateral upper extremity Active ROM was WFL (within functional limits)/bilateral  lower extremity Active ROM was WFL (within functional limits)/within sternal precautions

## 2019-11-16 NOTE — PROGRESS NOTE ADULT - ASSESSMENT
A/p  58 yo M w PMHx of HTN, HLD, obesity, psoriasis, CAD/RADHA, s/p OPCAB x 3 11/6, admitted for cardiogenci shock in the setting of pericardial effusion and tamponade s/p pericardial window and VAC placement on 11/14 his course cb shock liver and non oliguric GAUTAM associated with refractory hyperkalemia and requirement for RRT.

## 2019-11-16 NOTE — PROGRESS NOTE ADULT - SUBJECTIVE AND OBJECTIVE BOX
INTERVAL HPI/OVERNIGHT EVENTS:    11/6 OPCAB x 3  EF 45%     11/14 sternal wound opening/vac placement; pericardial effusion drained    60yo Male Hx HTN, HLD, obesity, psorasis, joyce, CAD/RADHA, chol with anginal sxs - s/p OPCAB x 3 11/6.     OR course uncomplicated.   small persistent apical PTX  post-op  - waterseal/clamp trials performed then ultimately removed D#6    presented to [place] 11/14 with SOB/severe anxiety.     (+)ARF/Hyperkalemia (K>6) and acute liver injury - AST/ALT >7K Lactate 13  Bleeding noted from sternal incision    Hemodynamically stable - not tachycardic    emergently transferred to [facility] 11/14:  Nl RV/LV size/function, Mild AR, Inter-atrial septal aneurysm. moderate circumferential pericardial effusion  No echocardiographic evidence of tamponade.    sternal incision opened at bedside - estimated 700cc out per d/w CTS with noted improvement in patient color  wound vac placed    multiple diuretic dosings and on lasix infusion; calcium/bicarb IVP; kayexelate given for Hyperkalemia  HD line placed right femoral anticipating possible need for HD    serial BMP monitored with progressive acidosis/refractory hyperkalemia   HD performed with marked improvement     mental status improved - readily arousable and interactive this am     PMHx includes but is not limited to:   CAD/Stent/CABG  Psoriasis  Obesity  HLD (hyperlipidemia)  HTN (hypertension)  H/O arthroscopy of knee  S/P cholecystectomy    ICU Vital Signs Last 24 Hrs  T(C): 36.4 (16 Nov 2019 09:00), Max: 37.7 (15 Nov 2019 11:40)  T(F): 97.6 (16 Nov 2019 09:00), Max: 99.8 (15 Nov 2019 11:40)  HR: 88 (16 Nov 2019 11:00) (66 - 93) sinus  BP: 149/84 (16 Nov 2019 11:00) (135/84 - 153/78)  BP(mean): 108 (16 Nov 2019 11:00) (99 - 108)  ABP: 152/82 (16 Nov 2019 11:00) (134/68 - 158/80)  ABP(mean): 110 (16 Nov 2019 11:00) (94 - 114)  SpO2: 97% (16 Nov 2019 11:00) (95% - 98%) HFNC 50/50 - to be removed and placed on NC    Qtts: None    I&O's Summary    15 Nov 2019 07:01  -  16 Nov 2019 07:00  --------------------------------------------------------  IN: 2775.3 mL / OUT: 3515 mL / NET: -739.7 mL    16 Nov 2019 07:01  -  16 Nov 2019 11:27  --------------------------------------------------------  IN: 10 mL / OUT: 150 mL / NET: -140 mL    Physical Exam    Heart regular (-)rub/gallop  Lungs - dec BS bases - no rhonchi/wheeze  Abd - (+)BS soft NTND (-)r/r/g  Ext - warm to touch; no cyanosis/clubbing  Chest - vac/retention sutures and mepelex in place - to be taken down today to assess with CTS  Neuro - alert/oriented and interactive; non-focal   Skin - no rash     LABS:                        9.9    21.87 )-----------( 169      ( 16 Nov 2019 10:18 )             30.8     11-16    130<L>  |  94<L>  |  44<H>  ----------------------------<  100<H>  4.5   |  22  |  5.47<H>    Ca    7.9<L>      16 Nov 2019 10:18  Phos  5.7     11-16  Mg     2.0     11-16    TPro  5.4<L>  /  Alb  3.2<L>  /  TBili  3.2<H>  /  DBili  x   /  AST  2333<H>  /  ALT  5743<H>  /  AlkPhos  189<H>  11-16    PT/INR - ( 16 Nov 2019 10:18 )   PT: 22.2 sec;   INR: 1.93     PTT - ( 16 Nov 2019 10:18 )  PTT:31.3 sec    ABG - ( 16 Nov 2019 10:16 ) 7.45/33/105/98    RADIOLOGY & ADDITIONAL STUDIES: reviewed    Patient with recent OPCAB discharged home presenting to OSH with shock state - ARF/acute liver injury and hyperkalemia with sternal drainage s/p local opening of chest incision with estimated 700c out - refractory acidosis and hyperkalemia necessitating hemodialysis session 11/15    1. CV  hemodynamically stable  sinus rhythm   LA normalized (peak 7.2) - now 1.2  ASA/Plavix - HOLD STATIN (LFT)  fluid drained sent for analysis - gram stain bland; Cx (-) to date    2. Renal   refractory hyperkalemia with worsening acidosis requiring HD 11/15  good UO with noted clearance   renal (Angelica) following - d/w covering physician   removed femoral HD cath and replaced RIJ 11/15  HypoNatremia - resolved  renally adjust meds    3. Pulm   remains on HFNC - transition to NC this am   OOB in chair - up and ambulating; incentive spirometry  p02 105  on zosyn at this time - vanco dosing given 11/15    4. GI  protonix   LFT >7000  review of meds and d/c all non-vital meds  would consider sono/doppler of renal vasculature  cont trend LFT  elevated ammonia level - repeat post-HD  having BM - to consider lactulose    DVT and GI prophylaxis    d/w family present at bedside, staff, renal attending and CTS      I have spent/provided stated minutes of critical care time to this patient: 90 min INTERVAL HPI/OVERNIGHT EVENTS:    11/6 OPCAB x 3  EF 45%     11/14 sternal wound opening/vac placement; pericardial effusion drained    58yo Male Hx HTN, HLD, obesity, psorasis, joyce, CAD/RADHA, chol with anginal sxs - s/p OPCAB x 3 11/6.     OR course uncomplicated.   small persistent apical PTX  post-op  - waterseal/clamp trials performed then ultimately removed D#6    presented to [place] 11/14 with SOB/severe anxiety.     (+)ARF/Hyperkalemia (K>6) and acute liver injury - AST/ALT >7K Lactate 13  Bleeding noted from sternal incision    Hemodynamically stable - not tachycardic    emergently transferred to [facility] 11/14:  Nl RV/LV size/function, Mild AR, Inter-atrial septal aneurysm. moderate circumferential pericardial effusion  No echocardiographic evidence of tamponade.    sternal incision opened at bedside - estimated 700cc out per d/w CTS with noted improvement in patient color  wound vac placed    multiple diuretic dosings and on lasix infusion; calcium/bicarb IVP; kayexelate given for Hyperkalemia  HD line placed right femoral anticipating possible need for HD    serial BMP monitored with progressive acidosis/refractory hyperkalemia   HD performed with marked improvement     mental status improved - readily arousable and interactive this am     PMHx includes but is not limited to:   CAD/Stent/CABG  Psoriasis  Obesity  HLD (hyperlipidemia)  HTN (hypertension)  H/O arthroscopy of knee  S/P cholecystectomy    ICU Vital Signs Last 24 Hrs  T(C): 36.4 (16 Nov 2019 09:00), Max: 37.7 (15 Nov 2019 11:40)  T(F): 97.6 (16 Nov 2019 09:00), Max: 99.8 (15 Nov 2019 11:40)  HR: 88 (16 Nov 2019 11:00) (66 - 93) sinus  BP: 149/84 (16 Nov 2019 11:00) (135/84 - 153/78)  BP(mean): 108 (16 Nov 2019 11:00) (99 - 108)  ABP: 152/82 (16 Nov 2019 11:00) (134/68 - 158/80)  ABP(mean): 110 (16 Nov 2019 11:00) (94 - 114)  SpO2: 97% (16 Nov 2019 11:00) (95% - 98%) HFNC 50/50 - to be removed and placed on NC    Qtts: None    I&O's Summary    15 Nov 2019 07:01  -  16 Nov 2019 07:00  --------------------------------------------------------  IN: 2775.3 mL / OUT: 3515 mL / NET: -739.7 mL    16 Nov 2019 07:01  -  16 Nov 2019 11:27  --------------------------------------------------------  IN: 10 mL / OUT: 150 mL / NET: -140 mL    Physical Exam    Heart regular (-)rub/gallop  Lungs - dec BS bases - no rhonchi/wheeze  Abd - (+)BS soft NTND (-)r/r/g  Ext - warm to touch; no cyanosis/clubbing  Chest - vac/retention sutures and mepelex in place - to be taken down today to assess with CTS  Neuro - alert/oriented and interactive; non-focal   Skin - no rash     LABS:                        9.9    21.87 )-----------( 169      ( 16 Nov 2019 10:18 )             30.8     11-16    130<L>  |  94<L>  |  44<H>  ----------------------------<  100<H>  4.5   |  22  |  5.47<H>    Ca    7.9<L>      16 Nov 2019 10:18  Phos  5.7     11-16  Mg     2.0     11-16    TPro  5.4<L>  /  Alb  3.2<L>  /  TBili  3.2<H>  /  DBili  x   /  AST  2333<H>  /  ALT  5743<H>  /  AlkPhos  189<H>  11-16    PT/INR - ( 16 Nov 2019 10:18 )   PT: 22.2 sec;   INR: 1.93     PTT - ( 16 Nov 2019 10:18 )  PTT:31.3 sec    ABG - ( 16 Nov 2019 10:16 ) 7.45/33/105/98    RADIOLOGY & ADDITIONAL STUDIES: reviewed    Patient with recent OPCAB discharged home presenting to OSH with shock state - ARF/acute liver injury and hyperkalemia with sternal drainage s/p local opening of chest incision with estimated 700c out - refractory acidosis and hyperkalemia necessitating hemodialysis session 11/15    1. CV  hemodynamically stable  sinus rhythm   LA normalized (peak 7.2) - now 1.2  ASA/Plavix - HOLD STATIN (LFT)  fluid drained sent for analysis - gram stain bland; Cx (-) to date    2. Renal   refractory hyperkalemia with worsening acidosis requiring HD 11/15  good UO with noted clearance ; ongoing reassessment   renal (Angelica) following - d/w covering physician   removed femoral HD cath and replaced RIJ 11/15  HypoNatremia - resolved  renally adjust meds    3. Pulm   remains on HFNC - transition to NC this am   OOB in chair - up and ambulating; incentive spirometry  p02 105  on zosyn at this time - vanco dosing given 11/15 - check vanco random level and redose as needed - goal level 15-20    4. GI  protonix   LFT >7000 - improved trend   review of meds and d/c all non-vital meds  would consider sono/doppler of renal vasculature  cont trend LFT  elevated ammonia level - repeat post-HD  having BMs in setting of given kayexelate/lactulose    DVT and GI prophylaxis    d/w patient/family present at bedside, staff, renal attending and CTS    I have spent/provided stated minutes of critical care time to this patient: 90 min

## 2019-11-16 NOTE — PROGRESS NOTE ADULT - PROBLEM SELECTOR PLAN 1
non- oliguric likely ATN in the setting of shock associated with sever metabolic acidosis, hyponatremia, refractory hyperkalemia, HD started 11/15 just for clearance  lytes normalized, still hyponatremic in 130s but improved from 126/ K 4.5 without acidosis, uop ~ 3 L for 24 hrs, off diuretics  - no sign of recovery yest as bun,cr rising but no need for HD today, will reassess tomorrow  Will follow  Discussed with Dr. Mota

## 2019-11-16 NOTE — PHYSICAL THERAPY INITIAL EVALUATION ADULT - ADDITIONAL COMMENTS
Patient reports that he lives with his spouse in a 2 level home. Functionally independent prior to admission. Does not require use assistive device for mobility.

## 2019-11-16 NOTE — PHYSICAL THERAPY INITIAL EVALUATION ADULT - GENERAL OBSERVATIONS, REHAB EVAL
Received supine in bed with + central line, HD port (R) IJ, A-line, cowart catheter, rectal tube, wound vac to MSI with abdominal binder in place, tele, (B) SCDs.

## 2019-11-16 NOTE — PROGRESS NOTE ADULT - SUBJECTIVE AND OBJECTIVE BOX
O/N Events: ZA  Subjective:  came off HF currently on 5 L NC, continued to have good UOP 3.5 L and FB -0.7L for the past 24 hr  had HD for clearance 11/15volume status and lytes are acceptable, hyponatremia improved from 126-130  non uremic on exam     VITALS  Vital Signs Last 24 Hrs  T(C): 36.8 (2019 14:00), Max: 37.2 (15 Nov 2019 19:50)  T(F): 98.2 (2019 14:00), Max: 98.9 (15 Nov 2019 19:50)  HR: 94 (2019 15:00) (82 - 94)  BP: 149/84 (2019 11:00) (135/84 - 153/78)  BP(mean): 108 (2019 11:00) (99 - 108)  RR: 26 (2019 15:00) (17 - 46)  SpO2: 97% (2019 15:00) (96% - 98%)    PHYSICAL EXAM  GENERAL: NAD  NECK: No JVD appreciated  CHEST/LUNG: equal breath sounds bilateral  HEART: normal S1S2, RRR  ABDOMEN: Soft, NT/ND  EXTREMITIES: Warm, No peripheral edema  Neurology: awake and alert, nonfocal   ACCESS: RIJ HD cath c.d.i    MEDICATIONS  (STANDING):  aspirin enteric coated 81 milliGRAM(s) Oral daily  clopidogrel Tablet 75 milliGRAM(s) Oral daily  heparin  Injectable 5000 Unit(s) SubCutaneous every 8 hours  pantoprazole  Injectable 40 milliGRAM(s) IV Push daily  piperacillin/tazobactam IVPB. 2.25 Gram(s) IV Intermittent once  piperacillin/tazobactam IVPB.. 2.25 Gram(s) IV Intermittent every 8 hours  sodium chloride 0.9% lock flush 3 milliLiter(s) IV Push every 8 hours  sodium chloride 0.9%. 1000 milliLiter(s) (75 mL/Hr) IV Continuous <Continuous>  vasopressin Infusion 0.03 Unit(s)/Min (1.8 mL/Hr) IV Continuous <Continuous>    MEDICATIONS  (PRN):      LABS                        9.9    21.87 )-----------( 169      ( 2019 10:18 )             30.8     11-16    130<L>  |  94<L>  |  44<H>  ----------------------------<  100<H>  4.5   |  22  |  5.47<H>    Ca    7.9<L>      2019 10:18  Phos  5.7       Mg     2.0         TPro  5.4<L>  /  Alb  3.2<L>  /  TBili  3.2<H>  /  DBili  x   /  AST  2333<H>  /  ALT  5743<H>  /  AlkPhos  189<H>      LIVER FUNCTIONS - ( 2019 10:18 )  Alb: 3.2 g/dL / Pro: 5.4 g/dL / ALK PHOS: 189 U/L / ALT: 5743 U/L / AST: 2333 U/L / GGT: x           PT/INR - ( 2019 10:18 )   PT: 22.2 sec;   INR: 1.93          PTT - ( 2019 10:18 )  PTT:31.3 sec  Urinalysis Basic - ( 15 Nov 2019 12:41 )    Color: Yellow / Appearance: SL Cloudy / S.010 / pH: x  Gluc: x / Ketone: NEGATIVE  / Bili: Negative / Urobili: 0.2 E.U./dL   Blood: x / Protein: Trace mg/dL / Nitrite: NEGATIVE   Leuk Esterase: NEGATIVE / RBC: Many /HPF / WBC < 5 /HPF   Sq Epi: x / Non Sq Epi: 0-5 /HPF / Bacteria: Present /HPF

## 2019-11-16 NOTE — PHYSICAL THERAPY INITIAL EVALUATION ADULT - PERTINENT HX OF CURRENT PROBLEM, REHAB EVAL
59M s/p OPCAB x 3 on 11/6, ultimately discharged home on POD6. On 11/14/19 pt presented to [facility] c/o severe anxiety and SOB. ED labs pertinent for K >6, BUN/Cr 36/3.22, AST/ALT >5000, Lactate 13 and bleeding was noted from sternotomy incision. As a result pt was transferred to CTICU emergently for evaluation. Found 59M s/p OPCAB x 3 on 11/6, ultimately discharged home on POD6. On 11/14/19 pt presented to [facility] c/o severe anxiety and SOB. ED labs pertinent for K >6, BUN/Cr 36/3.22, AST/ALT >5000, Lactate 13 and bleeding was noted from sternotomy incision. As a result pt was transferred to CTICU emergently for evaluation. Found to have pericardial effusion.

## 2019-11-16 NOTE — PROGRESS NOTE ADULT - SUBJECTIVE AND OBJECTIVE BOX
58yo Male Hx HTN, HLD, obesity, psorasis, joyce, CAD/RADHA, chol with anginal sxs - s/p OPCAB x 3 11/6.   11/6 OPCAB x 3  EF 45%   Discharged 11/12  readmitted on  11/14 with SOB/Anxiety   (+)ARF/Hyperkalemia (K>6) and acute liver injury - AST/ALT >7K Lactate 13  Bleeding noted from sternal incision  ECHO 11/14:  Nl RV/LV size/function, Mild AR, Inter-atrial septal aneurysm. moderate circumferential pericardial effusion  No echocardiographic evidence of tamponade.    sternal incision opened at bedside - estimated 700cc out per d/w CTS with noted improvement in patient color  wound vac placed    S/p emergent/urgent hemodialysis yesterday for persistent acidosis, hyperkalemia  clinically improve post HD    Problems  1. Cardiogenic shock - presumed obstructive from pericardial effusion/tamponade  2. multiorgan failure    Plan  Neuro -- encephalopathy from liver and renal failure - stable to improved  Ammonia 54  CVS - Stable BP   Pulm -Respiratory status improved   GI - Acute liver injury - improving LFTs    - s/p HD for hyperkalemia and acidosis  Good UOP. K 4.4  Endo - glycemic control  Heme - INR elevated in setting of Acute liver injury  ID - empiric antibiotics for sternal wound dehiscence      Critical post op.    Critical care time spent 50 min

## 2019-11-17 LAB
ALBUMIN SERPL ELPH-MCNC: 2.9 G/DL — LOW (ref 3.3–5)
ALP SERPL-CCNC: 219 U/L — HIGH (ref 40–120)
ALT FLD-CCNC: 4546 U/L — HIGH (ref 10–45)
ANION GAP SERPL CALC-SCNC: 17 MMOL/L — SIGNIFICANT CHANGE UP (ref 5–17)
APTT BLD: 29.8 SEC — SIGNIFICANT CHANGE UP (ref 27.5–36.3)
AST SERPL-CCNC: 1112 U/L — HIGH (ref 10–40)
BILIRUB SERPL-MCNC: 3.8 MG/DL — HIGH (ref 0.2–1.2)
BUN SERPL-MCNC: 56 MG/DL — HIGH (ref 7–23)
CALCIUM SERPL-MCNC: 7.6 MG/DL — LOW (ref 8.4–10.5)
CHLORIDE SERPL-SCNC: 90 MMOL/L — LOW (ref 96–108)
CO2 SERPL-SCNC: 22 MMOL/L — SIGNIFICANT CHANGE UP (ref 22–31)
CREAT SERPL-MCNC: 6.67 MG/DL — HIGH (ref 0.5–1.3)
GAS PNL BLDA: SIGNIFICANT CHANGE UP
GLUCOSE SERPL-MCNC: 101 MG/DL — HIGH (ref 70–99)
HCT VFR BLD CALC: 32.4 % — LOW (ref 39–50)
HGB BLD-MCNC: 10.4 G/DL — LOW (ref 13–17)
INR BLD: 1.62 — HIGH (ref 0.88–1.16)
LACTATE SERPL-SCNC: 1 MMOL/L — SIGNIFICANT CHANGE UP (ref 0.5–2)
MAGNESIUM SERPL-MCNC: 2.1 MG/DL — SIGNIFICANT CHANGE UP (ref 1.6–2.6)
MCHC RBC-ENTMCNC: 29.5 PG — SIGNIFICANT CHANGE UP (ref 27–34)
MCHC RBC-ENTMCNC: 32.1 GM/DL — SIGNIFICANT CHANGE UP (ref 32–36)
MCV RBC AUTO: 92 FL — SIGNIFICANT CHANGE UP (ref 80–100)
NRBC # BLD: 0 /100 WBCS — SIGNIFICANT CHANGE UP (ref 0–0)
PHOSPHATE SERPL-MCNC: 7.5 MG/DL — HIGH (ref 2.5–4.5)
PLATELET # BLD AUTO: 175 K/UL — SIGNIFICANT CHANGE UP (ref 150–400)
POTASSIUM SERPL-MCNC: 4.1 MMOL/L — SIGNIFICANT CHANGE UP (ref 3.5–5.3)
POTASSIUM SERPL-SCNC: 4.1 MMOL/L — SIGNIFICANT CHANGE UP (ref 3.5–5.3)
PROT SERPL-MCNC: 5.3 G/DL — LOW (ref 6–8.3)
PROTHROM AB SERPL-ACNC: 18.6 SEC — HIGH (ref 10–12.9)
RBC # BLD: 3.52 M/UL — LOW (ref 4.2–5.8)
RBC # FLD: 13.9 % — SIGNIFICANT CHANGE UP (ref 10.3–14.5)
SODIUM SERPL-SCNC: 129 MMOL/L — LOW (ref 135–145)
WBC # BLD: 20.23 K/UL — HIGH (ref 3.8–10.5)
WBC # FLD AUTO: 20.23 K/UL — HIGH (ref 3.8–10.5)

## 2019-11-17 PROCEDURE — 99291 CRITICAL CARE FIRST HOUR: CPT

## 2019-11-17 PROCEDURE — 99233 SBSQ HOSP IP/OBS HIGH 50: CPT | Mod: GC

## 2019-11-17 PROCEDURE — 71045 X-RAY EXAM CHEST 1 VIEW: CPT | Mod: 26

## 2019-11-17 PROCEDURE — 99233 SBSQ HOSP IP/OBS HIGH 50: CPT

## 2019-11-17 RX ORDER — CEFAZOLIN SODIUM 1 G
1000 VIAL (EA) INJECTION EVERY 8 HOURS
Refills: 0 | Status: DISCONTINUED | OUTPATIENT
Start: 2019-11-17 | End: 2019-11-25

## 2019-11-17 RX ADMIN — HEPARIN SODIUM 5000 UNIT(S): 5000 INJECTION INTRAVENOUS; SUBCUTANEOUS at 16:20

## 2019-11-17 RX ADMIN — HEPARIN SODIUM 5000 UNIT(S): 5000 INJECTION INTRAVENOUS; SUBCUTANEOUS at 06:53

## 2019-11-17 RX ADMIN — SODIUM CHLORIDE 3 MILLILITER(S): 9 INJECTION INTRAMUSCULAR; INTRAVENOUS; SUBCUTANEOUS at 21:27

## 2019-11-17 RX ADMIN — CLOPIDOGREL BISULFATE 75 MILLIGRAM(S): 75 TABLET, FILM COATED ORAL at 16:20

## 2019-11-17 RX ADMIN — HEPARIN SODIUM 5000 UNIT(S): 5000 INJECTION INTRAVENOUS; SUBCUTANEOUS at 21:32

## 2019-11-17 RX ADMIN — Medication 100 MILLIGRAM(S): at 21:32

## 2019-11-17 RX ADMIN — PANTOPRAZOLE SODIUM 40 MILLIGRAM(S): 20 TABLET, DELAYED RELEASE ORAL at 16:19

## 2019-11-17 RX ADMIN — PIPERACILLIN AND TAZOBACTAM 200 GRAM(S): 4; .5 INJECTION, POWDER, LYOPHILIZED, FOR SOLUTION INTRAVENOUS at 06:53

## 2019-11-17 RX ADMIN — Medication 81 MILLIGRAM(S): at 16:20

## 2019-11-17 RX ADMIN — SODIUM CHLORIDE 3 MILLILITER(S): 9 INJECTION INTRAMUSCULAR; INTRAVENOUS; SUBCUTANEOUS at 14:05

## 2019-11-17 RX ADMIN — SODIUM CHLORIDE 3 MILLILITER(S): 9 INJECTION INTRAMUSCULAR; INTRAVENOUS; SUBCUTANEOUS at 06:53

## 2019-11-17 RX ADMIN — Medication 100 MILLIGRAM(S): at 16:20

## 2019-11-17 NOTE — PROGRESS NOTE ADULT - ASSESSMENT
A/p  60 yo M w PMHx of HTN, HLD, obesity, psoriasis, CAD/RADHA, s/p OPCAB x 3 11/6, admitted for cardiogenci shock in the setting of pericardial effusion and tamponade s/p pericardial window and VAC placement on 11/14 his course cb shock liver and non oliguric GAUTAM associated with refractory hyperkalemia and requirement for RRT

## 2019-11-17 NOTE — PROGRESS NOTE ADULT - PROBLEM SELECTOR PLAN 1
non- oliguric GAUTAM, likely ATN in the setting of shock associated with sever metabolic acidosis, shock liver and refractory hyperkalemia, HD started 11/15 for clearance  volume status and lytes acceptable, K 4.1 without acidosis  - bun,cr rising but continues to have adequate UOP , Lytes remained acceptable, no need for HD today, will reassess tomorrow  Will follow

## 2019-11-17 NOTE — PROGRESS NOTE ADULT - SUBJECTIVE AND OBJECTIVE BOX
CTICU  CRITICAL  CARE  ATTENDING:   				   Pertinent clinical, laboratory, radiographic, hemodynamic, echocardiographic, respiratory data, microbiologic data and chart were reviewed and analyzed frequently throughout the course of the day and night    Patient seen and examined with CTS/ SH attending at bedside    Pt is a 59y Male admitted for opcab     HEALTH ISSUES - PROBLEM Dx:  GAUTAM (acute kidney injury): GAUTAM (acute kidney injury)         FAMILY HISTORY:  FH: heart disease  PAST MEDICAL & SURGICAL HISTORY:  Presence of stent in LAD coronary artery  CAD in native artery  Psoriasis  Obesity  HLD (hyperlipidemia)  HTN (hypertension)  S/P CABG (coronary artery bypass graft)  H/O arthroscopy of knee  S/P cholecystectomy      14 system review was unremarkable      Vital signs, hemodynamic and respiratory parameters were reviewed from the bedside nursing flowsheet.  ICU Vital Signs Last 24 Hrs  T(C): 37.7 (17 Nov 2019 20:46), Max: 37.7 (17 Nov 2019 20:46)  T(F): 99.8 (17 Nov 2019 20:46), Max: 99.8 (17 Nov 2019 20:46)  HR: 80 (17 Nov 2019 21:00) (80 - 98)  BP: 161/98 (17 Nov 2019 21:00) (160/92 - 167/99)  BP(mean): 124 (17 Nov 2019 21:00) (119 - 129)  ABP: 162/84 (17 Nov 2019 18:00) (150/76 - 174/90)  ABP(mean): 116 (17 Nov 2019 18:00) (106 - 128)  RR: 25 (17 Nov 2019 21:00) (12 - 35)  SpO2: 96% (17 Nov 2019 21:00) (94% - 98%)    Adult Advanced Hemodynamics Last 24 Hrs  CVP(mm Hg): --  CVP(cm H2O): --  CO: --  CI: --  PA: --  PA(mean): --  PCWP: --  SVR: --  SVRI: --  PVR: --  PVRI: --, ABG - ( 17 Nov 2019 02:33 )  pH, Arterial: 7.45  pH, Blood: x     /  pCO2: 32    /  pO2: 92    / HCO3: 22    / Base Excess: -1.7  /  SaO2: 97                  Intake and output was reviewed and the fluid balance was calculated  Daily     Daily   I&O's Summary    16 Nov 2019 07:01  -  17 Nov 2019 07:00  --------------------------------------------------------  IN: 560 mL / OUT: 2350 mL / NET: -1790 mL    17 Nov 2019 07:01  -  17 Nov 2019 21:46  --------------------------------------------------------  IN: 1200 mL / OUT: 1750 mL / NET: -550 mL        All lines and drain sites were assessed  Glycemic trend was reviewedCAPILLARY BLOOD GLUCOSE            Exam:   Gen: NAD   Neuro: Mental status  Lungs: Auscultation of the chest reveals equal bs  Abd: soft, nt/nd  Ext: warm and well perfused  Wound: appears clean and unremarkable  Antibiotics are periop      labs  CBC Full  -  ( 17 Nov 2019 02:35 )  WBC Count : 20.23 K/uL  RBC Count : 3.52 M/uL  Hemoglobin : 10.4 g/dL  Hematocrit : 32.4 %  Platelet Count - Automated : 175 K/uL  Mean Cell Volume : 92.0 fl  Mean Cell Hemoglobin : 29.5 pg  Mean Cell Hemoglobin Concentration : 32.1 gm/dL  Auto Neutrophil # : x  Auto Lymphocyte # : x  Auto Monocyte # : x  Auto Eosinophil # : x  Auto Basophil # : x  Auto Neutrophil % : x  Auto Lymphocyte % : x  Auto Monocyte % : x  Auto Eosinophil % : x  Auto Basophil % : x    11-17    129<L>  |  90<L>  |  56<H>  ----------------------------<  101<H>  4.1   |  22  |  6.67<H>    Ca    7.6<L>      17 Nov 2019 02:35  Phos  7.5     11-17  Mg     2.1     11-17    TPro  5.3<L>  /  Alb  2.9<L>  /  TBili  3.8<H>  /  DBili  x   /  AST  1112<H>  /  ALT  4546<H>  /  AlkPhos  219<H>  11-17    PT/INR - ( 17 Nov 2019 02:35 )   PT: 18.6 sec;   INR: 1.62          PTT - ( 17 Nov 2019 02:35 )  PTT:29.8 sec    The current medications were reviewed   MEDICATIONS  (STANDING):  aspirin enteric coated 81 milliGRAM(s) Oral daily  ceFAZolin   IVPB 1000 milliGRAM(s) IV Intermittent every 8 hours  clopidogrel Tablet 75 milliGRAM(s) Oral daily  heparin  Injectable 5000 Unit(s) SubCutaneous every 8 hours  pantoprazole  Injectable 40 milliGRAM(s) IV Push daily  sodium chloride 0.9% lock flush 3 milliLiter(s) IV Push every 8 hours  sodium chloride 0.9%. 1000 milliLiter(s) (75 mL/Hr) IV Continuous <Continuous>  vasopressin Infusion 0.03 Unit(s)/Min (1.8 mL/Hr) IV Continuous <Continuous>    MEDICATIONS  (PRN):       PROBLEM LIST/ ASSESSMENT:  HEALTH ISSUES - PROBLEM Dx:  GAUTAM (acute kidney injury): GAUTAM (acute kidney injury)         Patient is a 59y old  Male who presents with a chief complaint of sternal bleeding (17 Nov 2019 16:27) s/p opcab          My plan includes :  -Close hemodynamic, ventilatory and drain monitoring and management per post op routine  -Monitor for arrhythmias and monitor parameters for organ perfusion  -Monitor neurologic status  -Head of the bed should remain elevated to 45 deg .   -Chest PT and IS will be encouraged  -Monitor adequacy of oxygenation and ventilation and attempt to wean oxygen  -Nutritional goals will be met using po eventually , ensure adequate caloric intake and monitor the same  -Stress ulcer and VTE prophylaxis will be achieved    -Glycemic control is satisfactory  -Electrolytes have been repleated as necessary and wound care has been carried out. Pain control has been achieved.   -Agressive physical therapy and early mobility and ambulation goals will be met   The family was updated about the course and plan    CRITICAL CARE TIME SPENT in evaluation and management, reassessments, review and interpretation of labs and x-rays, ventilator and hemodynamic management, formulating a plan and coordinating care: ___30____ MIN.  Time does not include procedural time.      CTICU ATTENDING:      		  Zacarias Loomis MD

## 2019-11-17 NOTE — PROGRESS NOTE ADULT - SUBJECTIVE AND OBJECTIVE BOX
INTERVAL HPI/OVERNIGHT EVENTS:    11/6 OPCAB x 3  EF 45%     11/14 sternal wound opening/vac placement; pericardial effusion drained    58yo Male Hx HTN, HLD, obesity, psorasis, joyce, CAD/RADHA, chol with anginal sxs - s/p OPCAB x 3 11/6.     OR course uncomplicated.   small persistent apical PTX  post-op  - waterseal/clamp trials performed then ultimately removed D#6    presented to [facility] 11/14 with SOB/severe anxiety.     (+)ARF/Hyperkalemia (K>6) and acute liver injury - AST/ALT >7K Lactate 13  Bleeding noted from sternal incision    Hemodynamically stable - not tachycardic    emergently transferred to [facility] 11/14:  Nl RV/LV size/function, Mild AR, Inter-atrial septal aneurysm. moderate circumferential pericardial effusion  No echocardiographic evidence of tamponade.    sternal incision opened at bedside - estimated 700cc out per d/w CTS with noted improvement in patient color  wound vac placed    multiple diuretic dosings and on lasix infusion; calcium/bicarb IVP; kayexelate given for Hyperkalemia  HD line placed right femoral anticipating possible need for HD    serial BMP monitored with progressive acidosis/refractory hyperkalemia   HD performed with marked improvement     mental status improved - readily arousable and interactive this am   making good urine; currently OOB in chair    no new complaints when asked directly    PMHx includes but is not limited to:   CAD/Stent/CABG  Psoriasis  Obesity  HLD (hyperlipidemia)  HTN (hypertension)  H/O arthroscopy of knee  S/P cholecystectomy    ICU Vital Signs Last 24 Hrs  T(C): 37 (17 Nov 2019 09:00), Max: 37 (17 Nov 2019 09:00)  T(F): 98.6 (17 Nov 2019 09:00), Max: 98.6 (17 Nov 2019 09:00)  HR: 90 (17 Nov 2019 08:00) (86 - 96) sinus  BP: 159/97 (16 Nov 2019 21:00) (159/97 - 159/97)  BP(mean): 119 (16 Nov 2019 21:00) (119 - 119)  ABP: 150/76 (17 Nov 2019 08:00) (140/68 - 166/82)  ABP(mean): 106 (17 Nov 2019 08:00) (96 - 124)  SpO2: 95% (17 Nov 2019 08:00) (94% - 98%) 2L NC - now off     Qtts: None    I&O's Summary    16 Nov 2019 07:01  -  17 Nov 2019 07:00  --------------------------------------------------------  IN: 560 mL / OUT: 2350 mL / NET: -1790 mL    17 Nov 2019 07:01  -  17 Nov 2019 11:00  --------------------------------------------------------  IN: 0 mL / OUT: 100 mL / NET: -100 mL    Physical Exam    Heart regular (-)rub/gallop  Lungs - CTA anterior (-)rub/gallop  Abd - (+)BS protuberant - soft NTND (-)r/r/g  Ext - warm to touch; no cyanosis/clubbing  Chest - vac dressing in place with good seal  Neuro - alert/oriented and interactive - non-focal   Skin - no rash     LABS:                        10.4   20.23 )-----------( 175      ( 17 Nov 2019 02:35 )             32.4     11-17    129<L>  |  90<L>  |  56<H>  ----------------------------<  101<H>  4.1   |  22  |  6.67<H>    Ca    7.6<L>      17 Nov 2019 02:35  Phos  7.5     11-17  Mg     2.1     11-17    TPro  5.3<L>  /  Alb  2.9<L>  /  TBili  3.8<H>  /  DBili  x   /  AST  1112<H>  /  ALT  4546<H>  /  AlkPhos  219<H>  11-17    PT/INR - ( 17 Nov 2019 02:35 )   PT: 18.6 sec;   INR: 1.62     PTT - ( 17 Nov 2019 02:35 )  PTT:29.8 sec    ABG - ( 17 Nov 2019 02:33 ) 7.45/32/92/97    RADIOLOGY & ADDITIONAL STUDIES:  Blood Cx 11/14 (-) x 2  Sternal wound culture 11/14: no growth    Patient with recent OPCAB discharged home presenting to OSH with shock state - ARF/acute liver injury and hyperkalemia with sternal drainage s/p local opening of chest incision with estimated 700c out - refractory acidosis and hyperkalemia necessitating single hemodialysis session 11/15 - doing well    1. CV  hemodynamically stable  sinus rhythm   LA normalized (peak 7.2) - now 1.2  ASA/Plavix - HOLD STATIN (LFT)  fluid drained sent for analysis - gram stain bland; Cx (-) to date  plan de-escalate Abx to cefazolin for now  ongoing wound/vac care to sternum    2. Renal   refractory hyperkalemia with worsening acidosis requiring single HD session 11/15  good UO with noted clearance ; ongoing reassessment   renal (Angelica) following - d/w covering physician   removed femoral HD cath and replaced RIJ 11/15 - to be removed today - hope to see renal recovery over time  renally adjust meds    3. Pulm   titrate supplemental oxygen down - now on RA  may require 2L for ambulation   OOB in chair - up and ambulating; incentive spirometry    4. GI  protonix   Patient with recent OPCAB discharged home presenting to OSH with shock state - ARF/acute liver injury and hyperkalemia with sternal drainage s/p local opening of chest incision with estimated 700c out - refractory acidosis and hyperkalemia necessitating hemodialysis session 11/15    1. CV  hemodynamically stable  sinus rhythm   LA normalized (peak 7.2) - now 1.2  ASA/Plavix - HOLD STATIN (LFT)  fluid drained sent for analysis - gram stain bland; Cx (-) to date    2. Renal   refractory hyperkalemia with worsening acidosis requiring HD 11/15  good UO with noted clearance ; ongoing reassessment   renal (Angelica) following - d/w covering physician   removed femoral HD cath and replaced RIJ 11/15  HypoNatremia - resolved  renally adjust meds    3. Pulm   remains on HFNC - transition to NC this am   OOB in chair - up and ambulating; incentive spirometry  p02 105  on zosyn at this time - vanco dosing given 11/15 - check vanco random level and redose as needed - goal level 15-20    4. GI  protonix   LFT >7000 - improved trend   AST/ALT - 1112/4546  review of meds and d/c all non-vital meds  rectal tube to be removed - placed in light of BMs in setting of given kayexelate/lactulose dosings for ammonia and hyperkalemia  Hx Joyce noted    DVT and GI prophylaxis    d/w patient/family present at bedside, staff, renal attending and CTS    I have spent/provided stated minutes of critical care time to this patient: 60

## 2019-11-17 NOTE — PROGRESS NOTE ADULT - ATTENDING COMMENTS
I independently performed the key portions of the evaluation and management service provided. I agree with the above history, physical, and plan which I have reviewed and edited where appropriate. I find dialyzed 2d ago, hyperkalemia improved. Follow SCr. Follow K. Keep MAP > 65.  See full note. (Patient seen earlier in day.) .

## 2019-11-17 NOTE — PROGRESS NOTE ADULT - SUBJECTIVE AND OBJECTIVE BOX
O/N Events: ZA  Subjective:  much improved, sitting OOB in the chair, On RA, sating 96%, denies SOB or CP  lungs clear, no peripheral edema, BUN,Cr are rising but lytes are acceptable, LFTs are trending down, continued to make goourine   UOP ~ 2.4 L / FB -1.7 L     VITALS  Vital Signs Last 24 Hrs  T(C): 37.1 (17 Nov 2019 14:00), Max: 37.1 (17 Nov 2019 14:00)  T(F): 98.7 (17 Nov 2019 14:00), Max: 98.7 (17 Nov 2019 14:00)  HR: 87 (17 Nov 2019 16:08) (82 - 98)  BP: 167/99 (17 Nov 2019 15:00) (159/97 - 167/99)  BP(mean): 120 (17 Nov 2019 15:00) (119 - 129)  RR: 27 (17 Nov 2019 16:00) (12 - 36)  SpO2: 96% (17 Nov 2019 16:08) (94% - 98%)    PHYSICAL EXAM  GENERAL: NAD  CHEST/LUNG: equal breath sounds bilateral, diminished at bases   HEART: normal S1S2, RRR  ABDOMEN: Soft, NT/ND  EXTREMITIES: Warm, No peripheral edema  Neurology: awake and alert, nonfocal   ACCESS: RIJ HD cath c.d.i    MEDICATIONS  (STANDING):  aspirin enteric coated 81 milliGRAM(s) Oral daily  ceFAZolin   IVPB 1000 milliGRAM(s) IV Intermittent every 8 hours  clopidogrel Tablet 75 milliGRAM(s) Oral daily  heparin  Injectable 5000 Unit(s) SubCutaneous every 8 hours  pantoprazole  Injectable 40 milliGRAM(s) IV Push daily  sodium chloride 0.9% lock flush 3 milliLiter(s) IV Push every 8 hours  sodium chloride 0.9%. 1000 milliLiter(s) (75 mL/Hr) IV Continuous <Continuous>  vasopressin Infusion 0.03 Unit(s)/Min (1.8 mL/Hr) IV Continuous <Continuous>    MEDICATIONS  (PRN):      LABS                        10.4   20.23 )-----------( 175      ( 17 Nov 2019 02:35 )             32.4     11-17    129<L>  |  90<L>  |  56<H>  ----------------------------<  101<H>  4.1   |  22  |  6.67<H>    Ca    7.6<L>      17 Nov 2019 02:35  Phos  7.5     11-17  Mg     2.1     11-17    TPro  5.3<L>  /  Alb  2.9<L>  /  TBili  3.8<H>  /  DBili  x   /  AST  1112<H>  /  ALT  4546<H>  /  AlkPhos  219<H>  11-17    LIVER FUNCTIONS - ( 17 Nov 2019 02:35 )  Alb: 2.9 g/dL / Pro: 5.3 g/dL / ALK PHOS: 219 U/L / ALT: 4546 U/L / AST: 1112 U/L / GGT: x           PT/INR - ( 17 Nov 2019 02:35 )   PT: 18.6 sec;   INR: 1.62          PTT - ( 17 Nov 2019 02:35 )  PTT:29.8 sec

## 2019-11-18 LAB
ALBUMIN SERPL ELPH-MCNC: 2.8 G/DL — LOW (ref 3.3–5)
ALBUMIN SERPL ELPH-MCNC: 2.9 G/DL — LOW (ref 3.3–5)
ALBUMIN SERPL ELPH-MCNC: 3 G/DL — LOW (ref 3.3–5)
ALP SERPL-CCNC: 195 U/L — HIGH (ref 40–120)
ALP SERPL-CCNC: 201 U/L — HIGH (ref 40–120)
ALP SERPL-CCNC: 222 U/L — HIGH (ref 40–120)
ALT FLD-CCNC: 1430 U/L — HIGH (ref 10–45)
ALT FLD-CCNC: 2672 U/L — HIGH (ref 10–45)
ALT FLD-CCNC: 826 U/L — HIGH (ref 10–45)
ANION GAP SERPL CALC-SCNC: 17 MMOL/L — SIGNIFICANT CHANGE UP (ref 5–17)
ANION GAP SERPL CALC-SCNC: 19 MMOL/L — HIGH (ref 5–17)
ANION GAP SERPL CALC-SCNC: 19 MMOL/L — HIGH (ref 5–17)
APTT BLD: 31 SEC — SIGNIFICANT CHANGE UP (ref 27.5–36.3)
APTT BLD: 35.8 SEC — SIGNIFICANT CHANGE UP (ref 27.5–36.3)
APTT BLD: 36.6 SEC — HIGH (ref 27.5–36.3)
AST SERPL-CCNC: 120 U/L — HIGH (ref 10–40)
AST SERPL-CCNC: 150 U/L — HIGH (ref 10–40)
AST SERPL-CCNC: 298 U/L — HIGH (ref 10–40)
BILIRUB SERPL-MCNC: 2.6 MG/DL — HIGH (ref 0.2–1.2)
BILIRUB SERPL-MCNC: 3.1 MG/DL — HIGH (ref 0.2–1.2)
BILIRUB SERPL-MCNC: 3.8 MG/DL — HIGH (ref 0.2–1.2)
BUN SERPL-MCNC: 66 MG/DL — HIGH (ref 7–23)
BUN SERPL-MCNC: 75 MG/DL — HIGH (ref 7–23)
BUN SERPL-MCNC: 80 MG/DL — HIGH (ref 7–23)
CALCIUM SERPL-MCNC: 7.6 MG/DL — LOW (ref 8.4–10.5)
CALCIUM SERPL-MCNC: 7.8 MG/DL — LOW (ref 8.4–10.5)
CALCIUM SERPL-MCNC: 7.9 MG/DL — LOW (ref 8.4–10.5)
CHLORIDE SERPL-SCNC: 85 MMOL/L — LOW (ref 96–108)
CHLORIDE SERPL-SCNC: 85 MMOL/L — LOW (ref 96–108)
CHLORIDE SERPL-SCNC: 87 MMOL/L — LOW (ref 96–108)
CO2 SERPL-SCNC: 19 MMOL/L — LOW (ref 22–31)
CO2 SERPL-SCNC: 20 MMOL/L — LOW (ref 22–31)
CO2 SERPL-SCNC: 23 MMOL/L — SIGNIFICANT CHANGE UP (ref 22–31)
CREAT SERPL-MCNC: 8.09 MG/DL — HIGH (ref 0.5–1.3)
CREAT SERPL-MCNC: 8.48 MG/DL — HIGH (ref 0.5–1.3)
CREAT SERPL-MCNC: 9.15 MG/DL — HIGH (ref 0.5–1.3)
GLUCOSE SERPL-MCNC: 112 MG/DL — HIGH (ref 70–99)
GLUCOSE SERPL-MCNC: 142 MG/DL — HIGH (ref 70–99)
GLUCOSE SERPL-MCNC: 97 MG/DL — SIGNIFICANT CHANGE UP (ref 70–99)
HCT VFR BLD CALC: 30.3 % — LOW (ref 39–50)
HCT VFR BLD CALC: 33 % — LOW (ref 39–50)
HCT VFR BLD CALC: 34.1 % — LOW (ref 39–50)
HGB BLD-MCNC: 10.6 G/DL — LOW (ref 13–17)
HGB BLD-MCNC: 11 G/DL — LOW (ref 13–17)
HGB BLD-MCNC: 9.9 G/DL — LOW (ref 13–17)
INR BLD: 1.24 — HIGH (ref 0.88–1.16)
INR BLD: 1.32 — HIGH (ref 0.88–1.16)
INR BLD: 1.35 — HIGH (ref 0.88–1.16)
LACTATE SERPL-SCNC: 1.2 MMOL/L — SIGNIFICANT CHANGE UP (ref 0.5–2)
LACTATE SERPL-SCNC: 1.2 MMOL/L — SIGNIFICANT CHANGE UP (ref 0.5–2)
MAGNESIUM SERPL-MCNC: 2.1 MG/DL — SIGNIFICANT CHANGE UP (ref 1.6–2.6)
MAGNESIUM SERPL-MCNC: 2.2 MG/DL — SIGNIFICANT CHANGE UP (ref 1.6–2.6)
MAGNESIUM SERPL-MCNC: 2.2 MG/DL — SIGNIFICANT CHANGE UP (ref 1.6–2.6)
MCHC RBC-ENTMCNC: 29.1 PG — SIGNIFICANT CHANGE UP (ref 27–34)
MCHC RBC-ENTMCNC: 29.2 PG — SIGNIFICANT CHANGE UP (ref 27–34)
MCHC RBC-ENTMCNC: 29.3 PG — SIGNIFICANT CHANGE UP (ref 27–34)
MCHC RBC-ENTMCNC: 32.1 GM/DL — SIGNIFICANT CHANGE UP (ref 32–36)
MCHC RBC-ENTMCNC: 32.3 GM/DL — SIGNIFICANT CHANGE UP (ref 32–36)
MCHC RBC-ENTMCNC: 32.7 GM/DL — SIGNIFICANT CHANGE UP (ref 32–36)
MCV RBC AUTO: 89.6 FL — SIGNIFICANT CHANGE UP (ref 80–100)
MCV RBC AUTO: 90.5 FL — SIGNIFICANT CHANGE UP (ref 80–100)
MCV RBC AUTO: 90.7 FL — SIGNIFICANT CHANGE UP (ref 80–100)
NRBC # BLD: 0 /100 WBCS — SIGNIFICANT CHANGE UP (ref 0–0)
PHOSPHATE 24H UR-MCNC: 13.5 MG/DL — SIGNIFICANT CHANGE UP
PHOSPHATE SERPL-MCNC: 7.1 MG/DL — HIGH (ref 2.5–4.5)
PHOSPHATE SERPL-MCNC: 7.3 MG/DL — HIGH (ref 2.5–4.5)
PHOSPHATE SERPL-MCNC: 7.6 MG/DL — HIGH (ref 2.5–4.5)
PLATELET # BLD AUTO: 174 K/UL — SIGNIFICANT CHANGE UP (ref 150–400)
PLATELET # BLD AUTO: 192 K/UL — SIGNIFICANT CHANGE UP (ref 150–400)
PLATELET # BLD AUTO: 192 K/UL — SIGNIFICANT CHANGE UP (ref 150–400)
POTASSIUM SERPL-MCNC: 3.6 MMOL/L — SIGNIFICANT CHANGE UP (ref 3.5–5.3)
POTASSIUM SERPL-MCNC: 3.7 MMOL/L — SIGNIFICANT CHANGE UP (ref 3.5–5.3)
POTASSIUM SERPL-MCNC: 3.9 MMOL/L — SIGNIFICANT CHANGE UP (ref 3.5–5.3)
POTASSIUM SERPL-SCNC: 3.6 MMOL/L — SIGNIFICANT CHANGE UP (ref 3.5–5.3)
POTASSIUM SERPL-SCNC: 3.7 MMOL/L — SIGNIFICANT CHANGE UP (ref 3.5–5.3)
POTASSIUM SERPL-SCNC: 3.9 MMOL/L — SIGNIFICANT CHANGE UP (ref 3.5–5.3)
POTASSIUM UR-SCNC: 15 MMOL/L — SIGNIFICANT CHANGE UP
PROT ?TM UR-MCNC: 14 MG/DL — HIGH (ref 0–12)
PROT SERPL-MCNC: 5.5 G/DL — LOW (ref 6–8.3)
PROT SERPL-MCNC: 5.5 G/DL — LOW (ref 6–8.3)
PROT SERPL-MCNC: 5.6 G/DL — LOW (ref 6–8.3)
PROTHROM AB SERPL-ACNC: 14.1 SEC — HIGH (ref 10–12.9)
PROTHROM AB SERPL-ACNC: 15 SEC — HIGH (ref 10–12.9)
PROTHROM AB SERPL-ACNC: 15.4 SEC — HIGH (ref 10–12.9)
RBC # BLD: 3.38 M/UL — LOW (ref 4.2–5.8)
RBC # BLD: 3.64 M/UL — LOW (ref 4.2–5.8)
RBC # BLD: 3.77 M/UL — LOW (ref 4.2–5.8)
RBC # FLD: 13.7 % — SIGNIFICANT CHANGE UP (ref 10.3–14.5)
SODIUM SERPL-SCNC: 123 MMOL/L — LOW (ref 135–145)
SODIUM SERPL-SCNC: 124 MMOL/L — LOW (ref 135–145)
SODIUM SERPL-SCNC: 127 MMOL/L — LOW (ref 135–145)
SODIUM UR-SCNC: 74 MMOL/L — SIGNIFICANT CHANGE UP
SODIUM UR-SCNC: 78 MMOL/L — SIGNIFICANT CHANGE UP
UUN UR-MCNC: 159 MG/DL — SIGNIFICANT CHANGE UP
WBC # BLD: 17.7 K/UL — HIGH (ref 3.8–10.5)
WBC # BLD: 18.49 K/UL — HIGH (ref 3.8–10.5)
WBC # BLD: 21.62 K/UL — HIGH (ref 3.8–10.5)
WBC # FLD AUTO: 17.7 K/UL — HIGH (ref 3.8–10.5)
WBC # FLD AUTO: 18.49 K/UL — HIGH (ref 3.8–10.5)
WBC # FLD AUTO: 21.62 K/UL — HIGH (ref 3.8–10.5)

## 2019-11-18 PROCEDURE — 71045 X-RAY EXAM CHEST 1 VIEW: CPT | Mod: 26

## 2019-11-18 PROCEDURE — 93308 TTE F-UP OR LMTD: CPT | Mod: 26

## 2019-11-18 PROCEDURE — 99292 CRITICAL CARE ADDL 30 MIN: CPT

## 2019-11-18 PROCEDURE — 99291 CRITICAL CARE FIRST HOUR: CPT

## 2019-11-18 PROCEDURE — 99232 SBSQ HOSP IP/OBS MODERATE 35: CPT | Mod: GC

## 2019-11-18 PROCEDURE — 71250 CT THORAX DX C-: CPT | Mod: 26

## 2019-11-18 RX ORDER — SODIUM CHLORIDE 9 MG/ML
500 INJECTION INTRAMUSCULAR; INTRAVENOUS; SUBCUTANEOUS ONCE
Refills: 0 | Status: COMPLETED | OUTPATIENT
Start: 2019-11-18 | End: 2019-11-18

## 2019-11-18 RX ORDER — ACETAMINOPHEN 500 MG
650 TABLET ORAL ONCE
Refills: 0 | Status: COMPLETED | OUTPATIENT
Start: 2019-11-18 | End: 2019-11-18

## 2019-11-18 RX ORDER — ALBUMIN HUMAN 25 %
250 VIAL (ML) INTRAVENOUS ONCE
Refills: 0 | Status: COMPLETED | OUTPATIENT
Start: 2019-11-18 | End: 2019-11-18

## 2019-11-18 RX ADMIN — HEPARIN SODIUM 5000 UNIT(S): 5000 INJECTION INTRAVENOUS; SUBCUTANEOUS at 14:40

## 2019-11-18 RX ADMIN — SODIUM CHLORIDE 3 MILLILITER(S): 9 INJECTION INTRAMUSCULAR; INTRAVENOUS; SUBCUTANEOUS at 14:41

## 2019-11-18 RX ADMIN — Medication 125 MILLILITER(S): at 09:00

## 2019-11-18 RX ADMIN — HEPARIN SODIUM 5000 UNIT(S): 5000 INJECTION INTRAVENOUS; SUBCUTANEOUS at 22:07

## 2019-11-18 RX ADMIN — HEPARIN SODIUM 5000 UNIT(S): 5000 INJECTION INTRAVENOUS; SUBCUTANEOUS at 06:08

## 2019-11-18 RX ADMIN — Medication 100 MILLIGRAM(S): at 06:08

## 2019-11-18 RX ADMIN — SODIUM CHLORIDE 3 MILLILITER(S): 9 INJECTION INTRAMUSCULAR; INTRAVENOUS; SUBCUTANEOUS at 22:07

## 2019-11-18 RX ADMIN — Medication 81 MILLIGRAM(S): at 11:31

## 2019-11-18 RX ADMIN — CLOPIDOGREL BISULFATE 75 MILLIGRAM(S): 75 TABLET, FILM COATED ORAL at 11:31

## 2019-11-18 RX ADMIN — Medication 100 MILLIGRAM(S): at 22:07

## 2019-11-18 RX ADMIN — SODIUM CHLORIDE 500 MILLILITER(S): 9 INJECTION INTRAMUSCULAR; INTRAVENOUS; SUBCUTANEOUS at 22:07

## 2019-11-18 RX ADMIN — Medication 650 MILLIGRAM(S): at 07:12

## 2019-11-18 RX ADMIN — SODIUM CHLORIDE 3 MILLILITER(S): 9 INJECTION INTRAMUSCULAR; INTRAVENOUS; SUBCUTANEOUS at 06:09

## 2019-11-18 RX ADMIN — PANTOPRAZOLE SODIUM 40 MILLIGRAM(S): 20 TABLET, DELAYED RELEASE ORAL at 11:31

## 2019-11-18 RX ADMIN — Medication 650 MILLIGRAM(S): at 06:08

## 2019-11-18 RX ADMIN — Medication 100 MILLIGRAM(S): at 14:40

## 2019-11-18 RX ADMIN — Medication 125 MILLILITER(S): at 10:08

## 2019-11-18 NOTE — PROGRESS NOTE ADULT - SUBJECTIVE AND OBJECTIVE BOX
CTICU  CRITICAL  CARE  attending     Hand off received 					   Pertinent clinical, laboratory, radiographic, hemodynamic, echocardiographic, respiratory data, microbiologic data and chart were reviewed and analyzed frequently throughout the course of the day and night  Patient seen and examined with CTS/ SH attending at bedside  Pt is a 59y , Male, HEALTH ISSUES - PROBLEM Dx:  GAUTAM (acute kidney injury): GAUTAM (acute kidney injury)      , FAMILY HISTORY:  FH: heart disease  PAST MEDICAL & SURGICAL HISTORY:  Presence of stent in LAD coronary artery  CAD in native artery  Psoriasis  Obesity  HLD (hyperlipidemia)  HTN (hypertension)  S/P CABG (coronary artery bypass graft)  H/O arthroscopy of knee  S/P cholecystectomy    Patient is a 59y old  Male who presents with a chief complaint of sternal bleeding (18 Nov 2019 09:08)      14 system review was unremarkable    Vital signs, hemodynamic and respiratory parameters were reviewed from the bedside nursing flowsheet.  ICU Vital Signs Last 24 Hrs  T(C): 36.3 (18 Nov 2019 22:40), Max: 37.1 (18 Nov 2019 01:01)  T(F): 97.4 (18 Nov 2019 22:40), Max: 98.7 (18 Nov 2019 01:01)  HR: 84 (18 Nov 2019 22:00) (80 - 99)  BP: 141/79 (18 Nov 2019 22:00) (111/77 - 169/95)  BP(mean): 94 (18 Nov 2019 22:00) (87 - 134)  ABP: --  ABP(mean): --  RR: 24 (18 Nov 2019 22:00) (15 - 34)  SpO2: 96% (18 Nov 2019 22:00) (91% - 97%)    Adult Advanced Hemodynamics Last 24 Hrs  CVP(mm Hg): --  CVP(cm H2O): --  CO: --  CI: --  PA: --  PA(mean): --  PCWP: --  SVR: --  SVRI: --  PVR: --  PVRI: --, ABG - ( 17 Nov 2019 02:33 )  pH, Arterial: 7.45  pH, Blood: x     /  pCO2: 32    /  pO2: 92    / HCO3: 22    / Base Excess: -1.7  /  SaO2: 97                  Intake and output was reviewed and the fluid balance was calculated  Daily     Daily   I&O's Summary    17 Nov 2019 07:01  -  18 Nov 2019 07:00  --------------------------------------------------------  IN: 1200 mL / OUT: 2870 mL / NET: -1670 mL    18 Nov 2019 07:01  -  18 Nov 2019 23:50  --------------------------------------------------------  IN: 1275 mL / OUT: 1950 mL / NET: -675 mL        All lines and drain sites were assessed  Glycemic trend was reviewedCAPILLARY BLOOD GLUCOSE        No acute change in mental status  Auscultation of the chest reveals equal bs  Abdomen is soft  Extremities are warm and well perfused  Wounds appear clean and unremarkable  Antibiotics are periop    labs  CBC Full  -  ( 18 Nov 2019 22:50 )  WBC Count : 17.70 K/uL  RBC Count : 3.38 M/uL  Hemoglobin : 9.9 g/dL  Hematocrit : 30.3 %  Platelet Count - Automated : 174 K/uL  Mean Cell Volume : 89.6 fl  Mean Cell Hemoglobin : 29.3 pg  Mean Cell Hemoglobin Concentration : 32.7 gm/dL  Auto Neutrophil # : x  Auto Lymphocyte # : x  Auto Monocyte # : x  Auto Eosinophil # : x  Auto Basophil # : x  Auto Neutrophil % : x  Auto Lymphocyte % : x  Auto Monocyte % : x  Auto Eosinophil % : x  Auto Basophil % : x    11-18    127<L>  |  87<L>  |  80<H>  ----------------------------<  112<H>  3.7   |  23  |  9.15<H>    Ca    7.8<L>      18 Nov 2019 22:50  Phos  7.3     11-18  Mg     2.2     11-18    TPro  5.5<L>  /  Alb  2.9<L>  /  TBili  2.6<H>  /  DBili  x   /  AST  120<H>  /  ALT  826<H>  /  AlkPhos  195<H>  11-18    PT/INR - ( 18 Nov 2019 22:50 )   PT: 14.1 sec;   INR: 1.24          PTT - ( 18 Nov 2019 22:50 )  PTT:36.6 sec  The current medications were reviewed   MEDICATIONS  (STANDING):  aspirin enteric coated 81 milliGRAM(s) Oral daily  ceFAZolin   IVPB 1000 milliGRAM(s) IV Intermittent every 8 hours  clopidogrel Tablet 75 milliGRAM(s) Oral daily  heparin  Injectable 5000 Unit(s) SubCutaneous every 8 hours  pantoprazole  Injectable 40 milliGRAM(s) IV Push daily  sodium chloride 0.9% lock flush 3 milliLiter(s) IV Push every 8 hours  sodium chloride 0.9%. 1000 milliLiter(s) (75 mL/Hr) IV Continuous <Continuous>  vasopressin Infusion 0.03 Unit(s)/Min (1.8 mL/Hr) IV Continuous <Continuous>    MEDICATIONS  (PRN):       PROBLEM LIST/ ASSESSMENT:  HEALTH ISSUES - PROBLEM Dx:  GAUTAM (acute kidney injury): GAUTAM (acute kidney injury)      ,   Patient is a 59y old  Male who presents with a chief complaint of sternal bleeding (18 Nov 2019 09:08)     s/p cardiac surgery              My plan includes :  close hemodynamic, ventilatory and drain monitoring and management per post op routine    Monitor for arrhythmias and monitor parameters for organ perfusion  beta blockade not administered due to hemodynamic instability and bradycardia  monitor neurologic status  Head of the bed should remain elevated to 45 deg .   chest PT and IS will be encouraged  monitor adequacy of oxygenation and ventilation and attempt to wean oxygen  antibiotic regimen will be tailored to the clinical, laboratory and microbiologic data  Nutritional goals will be met using po eventually , ensure adequate caloric intake and montior the same  Stress ulcer and VTE prophylaxis will be achieved    Glycemic control is satisfactory  Electrolytes have been repleted as necessary and wound care has been carried out. Pain control has been achieved.   agressive physical therapy and early mobility and ambulation goals will be met   The family was updated about the course and plan  CRITICAL CARE TIME personally provided by me  in evaluation and management, reassessments, review and interpretation of labs and x-rays, ventilator and hemodynamic management, formulating a plan and coordinating care: ___90____ MIN.  Time does not include procedural time.  CTICU ATTENDING     					    Jeremy Baum MD

## 2019-11-18 NOTE — PROGRESS NOTE ADULT - SUBJECTIVE AND OBJECTIVE BOX
Patient is a 59y Male seen and evaluated at bedside.   no acute events overnight  sitting in the chair, breathing comfortably on RA, sat 96%  off pressors, off diuretics  cowart in place, uop 125-150 cc/hr  labs and images reviewed        Meds:  albumin human  5% IVPB 250 once  aspirin enteric coated 81 daily  ceFAZolin   IVPB 1000 every 8 hours  clopidogrel Tablet 75 daily  heparin  Injectable 5000 every 8 hours  pantoprazole  Injectable 40 daily  sodium chloride 0.9% lock flush 3 every 8 hours  sodium chloride 0.9%. 1000 <Continuous>  vasopressin Infusion 0.03 <Continuous>      T(C): , Max: 37.7 (11-17-19 @ 20:46)  T(F): , Max: 99.8 (11-17-19 @ 20:46)  HR: 88 (11-18-19 @ 10:00)  BP: 127/93 (11-18-19 @ 10:00)  BP(mean): 110 (11-18-19 @ 10:00)  RR: 24 (11-18-19 @ 10:00)  SpO2: 96% (11-18-19 @ 10:00)  Wt(kg): --    11-17 @ 07:01  -  11-18 @ 07:00  --------------------------------------------------------  IN: 1200 mL / OUT: 2870 mL / NET: -1670 mL    11-18 @ 07:01  -  11-18 @ 10:09  --------------------------------------------------------  IN: 500 mL / OUT: 335 mL / NET: 165 mL          PHYSICAL EXAM  GENERAL: NAD  CHEST/LUNG: equal breath sounds bilateral, diminished at bases   HEART: normal S1S2, RRR  ABDOMEN: Soft, NT/ND  EXTREMITIES: Warm, No peripheral edema  Neurology: awake and alert, nonfocal   ACCESS: [facility], site dry and clean        LABS:                        11.0   21.62 )-----------( 192      ( 18 Nov 2019 02:51 )             34.1     11-18    124<L>  |  85<L>  |  66<H>  ----------------------------<  97  3.9   |  20<L>  |  8.09<H>    Ca    7.9<L>      18 Nov 2019 02:51  Phos  7.1     11-18  Mg     2.1     11-18    TPro  5.6<L>  /  Alb  2.8<L>  /  TBili  3.8<H>  /  DBili  x   /  AST  298<H>  /  ALT  2672<H>  /  AlkPhos  222<H>  11-18      PT/INR - ( 18 Nov 2019 02:51 )   PT: 15.4 sec;   INR: 1.35          PTT - ( 18 Nov 2019 02:51 )  PTT:35.8 sec          RADIOLOGY & ADDITIONAL STUDIES:    < from: Xray Chest 1 View- PORTABLE-Routine (11.17.19 @ 03:02) >    EXAM:  XR CHEST PORTABLE ROUTINE 1V                          PROCEDURE DATE:  11/17/2019          INTERPRETATION:  Clinical History: Postop    Portable examination chest demonstrates patient to be status post   sternotomy. Left internal jugular line noted tip overlying right atrium.   Improvement congestion and/or infiltrates in comparison to prior   examination of the chest 11/16/2019.    Impression: Improvement congestion and/or infiltrates    < end of copied text >

## 2019-11-18 NOTE — PROGRESS NOTE ADULT - ASSESSMENT
60 yo M w PMHx of HTN, HLD, obesity, psoriasis, CAD/RADHA, s/p OPCAB x 3 11/6, admitted for cardiogenci shock in the setting of pericardial effusion and tamponade s/p pericardial window and VAC placement on 11/14 his course cb shock liver and non oliguric GAUTAM associated with refractory hyperkalemia and requirement for RRT.      # Nonoliguric GAUTAM  - likely ATN in the setting of shock associated with sever metabolic acidosis, shock liver and refractory hyperkalemia, s/p one HD session on 11/15 for clearance  - volume status noted, acceptable  - BUN/Cr trending up to 66/8.0 from 56/6.6 but continues to have adequate UOP, 125-150 cc/hr  - lytes remain acceptable, not uremic, no need for HD today, will reassess on daily basis    # Hyponatremia  - Na 124  - please repeat serum osm, urine osm, urine Na  - might need 3% NaCl if Na continues trending down   - monitor bmp Q4-6hr  - free water restriction    Discussed with attending Dr Reyes.

## 2019-11-18 NOTE — PROGRESS NOTE ADULT - ATTENDING COMMENTS
weekend events reviewed  clinically much improved, but creatinine still rising.  Has not needed any additional HD as yet but creat at 8.4  Na down 122 range after correcting to 130 range  await repeat Sosm- add saline if hypo-osmolal  reviewed with CTS team.  defer repeat HD today as clinically progressing and now with good urine output

## 2019-11-18 NOTE — PROGRESS NOTE ADULT - SUBJECTIVE AND OBJECTIVE BOX
CTICU  CRITICAL  CARE  attending     Hand off received 					   Pertinent clinical, laboratory, radiographic, hemodynamic, echocardiographic, respiratory data, microbiologic data and chart were reviewed and analyzed frequently throughout the course of the day and night  Patient seen and examined with CTS/ SH attending at bedside  Pt is a 59y , Male, HEALTH ISSUES - PROBLEM Dx:  GAUTAM (acute kidney injury): GAUTAM (acute kidney injury)      , FAMILY HISTORY:  FH: heart disease  PAST MEDICAL & SURGICAL HISTORY:  Presence of stent in LAD coronary artery  CAD in native artery  Psoriasis  Obesity  HLD (hyperlipidemia)  HTN (hypertension)  S/P CABG (coronary artery bypass graft)  H/O arthroscopy of knee  S/P cholecystectomy    Patient is a 59y old  Male who presents with a chief complaint of sternal bleeding (18 Nov 2019 23:50)      14 system review was unremarkable    Vital signs, hemodynamic and respiratory parameters were reviewed from the bedside nursing flowsheet.  ICU Vital Signs Last 24 Hrs  T(C): 36.3 (18 Nov 2019 22:40), Max: 37.1 (18 Nov 2019 01:01)  T(F): 97.4 (18 Nov 2019 22:40), Max: 98.7 (18 Nov 2019 01:01)  HR: 84 (18 Nov 2019 22:00) (80 - 99)  BP: 141/79 (18 Nov 2019 22:00) (111/77 - 169/95)  BP(mean): 94 (18 Nov 2019 22:00) (87 - 134)  ABP: --  ABP(mean): --  RR: 24 (18 Nov 2019 22:00) (15 - 34)  SpO2: 96% (18 Nov 2019 22:00) (91% - 97%)    Adult Advanced Hemodynamics Last 24 Hrs  CVP(mm Hg): --  CVP(cm H2O): --  CO: --  CI: --  PA: --  PA(mean): --  PCWP: --  SVR: --  SVRI: --  PVR: --  PVRI: --, ABG - ( 17 Nov 2019 02:33 )  pH, Arterial: 7.45  pH, Blood: x     /  pCO2: 32    /  pO2: 92    / HCO3: 22    / Base Excess: -1.7  /  SaO2: 97                  Intake and output was reviewed and the fluid balance was calculated  Daily     Daily   I&O's Summary    17 Nov 2019 07:01  -  18 Nov 2019 07:00  --------------------------------------------------------  IN: 1200 mL / OUT: 2870 mL / NET: -1670 mL    18 Nov 2019 07:01  -  18 Nov 2019 23:51  --------------------------------------------------------  IN: 1275 mL / OUT: 1950 mL / NET: -675 mL        All lines and drain sites were assessed  Glycemic trend was reviewedCAPILLARY BLOOD GLUCOSE        No acute change in mental status  Auscultation of the chest reveals equal bs  Abdomen is soft  Extremities are warm and well perfused  Wounds appear clean and unremarkable  Antibiotics are periop    labs  CBC Full  -  ( 18 Nov 2019 22:50 )  WBC Count : 17.70 K/uL  RBC Count : 3.38 M/uL  Hemoglobin : 9.9 g/dL  Hematocrit : 30.3 %  Platelet Count - Automated : 174 K/uL  Mean Cell Volume : 89.6 fl  Mean Cell Hemoglobin : 29.3 pg  Mean Cell Hemoglobin Concentration : 32.7 gm/dL  Auto Neutrophil # : x  Auto Lymphocyte # : x  Auto Monocyte # : x  Auto Eosinophil # : x  Auto Basophil # : x  Auto Neutrophil % : x  Auto Lymphocyte % : x  Auto Monocyte % : x  Auto Eosinophil % : x  Auto Basophil % : x    11-18    127<L>  |  87<L>  |  80<H>  ----------------------------<  112<H>  3.7   |  23  |  9.15<H>    Ca    7.8<L>      18 Nov 2019 22:50  Phos  7.3     11-18  Mg     2.2     11-18    TPro  5.5<L>  /  Alb  2.9<L>  /  TBili  2.6<H>  /  DBili  x   /  AST  120<H>  /  ALT  826<H>  /  AlkPhos  195<H>  11-18    PT/INR - ( 18 Nov 2019 22:50 )   PT: 14.1 sec;   INR: 1.24          PTT - ( 18 Nov 2019 22:50 )  PTT:36.6 sec  The current medications were reviewed   MEDICATIONS  (STANDING):  aspirin enteric coated 81 milliGRAM(s) Oral daily  ceFAZolin   IVPB 1000 milliGRAM(s) IV Intermittent every 8 hours  clopidogrel Tablet 75 milliGRAM(s) Oral daily  heparin  Injectable 5000 Unit(s) SubCutaneous every 8 hours  pantoprazole  Injectable 40 milliGRAM(s) IV Push daily  sodium chloride 0.9% lock flush 3 milliLiter(s) IV Push every 8 hours  sodium chloride 0.9%. 1000 milliLiter(s) (75 mL/Hr) IV Continuous <Continuous>  vasopressin Infusion 0.03 Unit(s)/Min (1.8 mL/Hr) IV Continuous <Continuous>    MEDICATIONS  (PRN):       PROBLEM LIST/ ASSESSMENT:  HEALTH ISSUES - PROBLEM Dx:  GAUTAM (acute kidney injury): GAUTAM (acute kidney injury)      ,   Patient is a 59y old  Male who presents with a chief complaint of sternal bleeding (18 Nov 2019 23:50)     s/p cardiac surgery              My plan includes :  close hemodynamic, ventilatory and drain monitoring and management per post op routine    Monitor for arrhythmias and monitor parameters for organ perfusion  beta blockade not administered due to hemodynamic instability and bradycardia  monitor neurologic status  Head of the bed should remain elevated to 45 deg .   chest PT and IS will be encouraged  monitor adequacy of oxygenation and ventilation and attempt to wean oxygen  antibiotic regimen will be tailored to the clinical, laboratory and microbiologic data  Nutritional goals will be met using po eventually , ensure adequate caloric intake and montior the same  Stress ulcer and VTE prophylaxis will be achieved    Glycemic control is satisfactory  Electrolytes have been repleted as necessary and wound care has been carried out. Pain control has been achieved.   agressive physical therapy and early mobility and ambulation goals will be met   The family was updated about the course and plan  CRITICAL CARE TIME personally provided by me  in evaluation and management, reassessments, review and interpretation of labs and x-rays, ventilator and hemodynamic management, formulating a plan and coordinating care: ___90____ MIN.  Time does not include procedural time.  CTICU ATTENDING     					    Jeremy Baum MD

## 2019-11-19 ENCOUNTER — APPOINTMENT (OUTPATIENT)
Dept: CARDIOTHORACIC SURGERY | Facility: CLINIC | Age: 59
End: 2019-11-19

## 2019-11-19 LAB
ALBUMIN SERPL ELPH-MCNC: 2.9 G/DL — LOW (ref 3.3–5)
ALBUMIN SERPL ELPH-MCNC: 3.4 G/DL — SIGNIFICANT CHANGE UP (ref 3.3–5)
ALP SERPL-CCNC: 187 U/L — HIGH (ref 40–120)
ALP SERPL-CCNC: 192 U/L — HIGH (ref 40–120)
ALT FLD-CCNC: 260 U/L — HIGH (ref 10–45)
ALT FLD-CCNC: 526 U/L — HIGH (ref 10–45)
ANION GAP SERPL CALC-SCNC: 18 MMOL/L — HIGH (ref 5–17)
ANION GAP SERPL CALC-SCNC: 18 MMOL/L — HIGH (ref 5–17)
APTT BLD: 31.5 SEC — SIGNIFICANT CHANGE UP (ref 27.5–36.3)
APTT BLD: 34.4 SEC — SIGNIFICANT CHANGE UP (ref 27.5–36.3)
AST SERPL-CCNC: 110 U/L — HIGH (ref 10–40)
AST SERPL-CCNC: 82 U/L — HIGH (ref 10–40)
BILIRUB SERPL-MCNC: 2.2 MG/DL — HIGH (ref 0.2–1.2)
BILIRUB SERPL-MCNC: 2.6 MG/DL — HIGH (ref 0.2–1.2)
BUN SERPL-MCNC: 80 MG/DL — HIGH (ref 7–23)
BUN SERPL-MCNC: 81 MG/DL — HIGH (ref 7–23)
CALCIUM SERPL-MCNC: 7.9 MG/DL — LOW (ref 8.4–10.5)
CALCIUM SERPL-MCNC: 7.9 MG/DL — LOW (ref 8.4–10.5)
CHLORIDE SERPL-SCNC: 88 MMOL/L — LOW (ref 96–108)
CHLORIDE SERPL-SCNC: 90 MMOL/L — LOW (ref 96–108)
CO2 SERPL-SCNC: 21 MMOL/L — LOW (ref 22–31)
CO2 SERPL-SCNC: 21 MMOL/L — LOW (ref 22–31)
CREAT SERPL-MCNC: 9.17 MG/DL — HIGH (ref 0.5–1.3)
CREAT SERPL-MCNC: 9.49 MG/DL — HIGH (ref 0.5–1.3)
CULTURE RESULTS: NO GROWTH — SIGNIFICANT CHANGE UP
CULTURE RESULTS: SIGNIFICANT CHANGE UP
CULTURE RESULTS: SIGNIFICANT CHANGE UP
GLUCOSE SERPL-MCNC: 114 MG/DL — HIGH (ref 70–99)
GLUCOSE SERPL-MCNC: 99 MG/DL — SIGNIFICANT CHANGE UP (ref 70–99)
HCT VFR BLD CALC: 31.6 % — LOW (ref 39–50)
HCT VFR BLD CALC: 34 % — LOW (ref 39–50)
HGB BLD-MCNC: 10.5 G/DL — LOW (ref 13–17)
HGB BLD-MCNC: 11.1 G/DL — LOW (ref 13–17)
INR BLD: 1.15 — SIGNIFICANT CHANGE UP (ref 0.88–1.16)
INR BLD: 1.19 — HIGH (ref 0.88–1.16)
LACTATE SERPL-SCNC: 1.4 MMOL/L — SIGNIFICANT CHANGE UP (ref 0.5–2)
MAGNESIUM SERPL-MCNC: 2.3 MG/DL — SIGNIFICANT CHANGE UP (ref 1.6–2.6)
MAGNESIUM SERPL-MCNC: 2.3 MG/DL — SIGNIFICANT CHANGE UP (ref 1.6–2.6)
MCHC RBC-ENTMCNC: 29.4 PG — SIGNIFICANT CHANGE UP (ref 27–34)
MCHC RBC-ENTMCNC: 29.6 PG — SIGNIFICANT CHANGE UP (ref 27–34)
MCHC RBC-ENTMCNC: 32.6 GM/DL — SIGNIFICANT CHANGE UP (ref 32–36)
MCHC RBC-ENTMCNC: 33.2 GM/DL — SIGNIFICANT CHANGE UP (ref 32–36)
MCV RBC AUTO: 89 FL — SIGNIFICANT CHANGE UP (ref 80–100)
MCV RBC AUTO: 90.2 FL — SIGNIFICANT CHANGE UP (ref 80–100)
NRBC # BLD: 0 /100 WBCS — SIGNIFICANT CHANGE UP (ref 0–0)
NRBC # BLD: 0 /100 WBCS — SIGNIFICANT CHANGE UP (ref 0–0)
OSMOLALITY SERPL: 288 MOSMOL/KG — SIGNIFICANT CHANGE UP (ref 280–301)
OSMOLALITY SERPL: 297 MOSMOL/KG — SIGNIFICANT CHANGE UP (ref 280–301)
OSMOLALITY SERPL: 300 MOSMOL/KG — SIGNIFICANT CHANGE UP (ref 280–301)
OSMOLALITY UR: 254 MOSM/KG — SIGNIFICANT CHANGE UP (ref 50–1200)
OSMOLALITY UR: 258 MOSM/KG — SIGNIFICANT CHANGE UP (ref 50–1200)
PHOSPHATE 24H UR-MCNC: 12.6 MG/DL — SIGNIFICANT CHANGE UP
PHOSPHATE SERPL-MCNC: 7.3 MG/DL — HIGH (ref 2.5–4.5)
PLATELET # BLD AUTO: 170 K/UL — SIGNIFICANT CHANGE UP (ref 150–400)
PLATELET # BLD AUTO: 196 K/UL — SIGNIFICANT CHANGE UP (ref 150–400)
POTASSIUM SERPL-MCNC: 3.7 MMOL/L — SIGNIFICANT CHANGE UP (ref 3.5–5.3)
POTASSIUM SERPL-MCNC: 3.9 MMOL/L — SIGNIFICANT CHANGE UP (ref 3.5–5.3)
POTASSIUM SERPL-SCNC: 3.7 MMOL/L — SIGNIFICANT CHANGE UP (ref 3.5–5.3)
POTASSIUM SERPL-SCNC: 3.9 MMOL/L — SIGNIFICANT CHANGE UP (ref 3.5–5.3)
POTASSIUM UR-SCNC: 15 MMOL/L — SIGNIFICANT CHANGE UP
PROT ?TM UR-MCNC: 26 MG/DL — HIGH (ref 0–12)
PROT SERPL-MCNC: 5.7 G/DL — LOW (ref 6–8.3)
PROT SERPL-MCNC: 6 G/DL — SIGNIFICANT CHANGE UP (ref 6–8.3)
PROTHROM AB SERPL-ACNC: 13 SEC — HIGH (ref 10–12.9)
PROTHROM AB SERPL-ACNC: 13.5 SEC — HIGH (ref 10–12.9)
RBC # BLD: 3.55 M/UL — LOW (ref 4.2–5.8)
RBC # BLD: 3.77 M/UL — LOW (ref 4.2–5.8)
RBC # FLD: 13.9 % — SIGNIFICANT CHANGE UP (ref 10.3–14.5)
RBC # FLD: 14.1 % — SIGNIFICANT CHANGE UP (ref 10.3–14.5)
SODIUM SERPL-SCNC: 127 MMOL/L — LOW (ref 135–145)
SODIUM SERPL-SCNC: 129 MMOL/L — LOW (ref 135–145)
SODIUM UR-SCNC: 65 MMOL/L — SIGNIFICANT CHANGE UP
SPECIMEN SOURCE: SIGNIFICANT CHANGE UP
UUN UR-MCNC: 159 MG/DL — SIGNIFICANT CHANGE UP
WBC # BLD: 17.48 K/UL — HIGH (ref 3.8–10.5)
WBC # BLD: 18.55 K/UL — HIGH (ref 3.8–10.5)
WBC # FLD AUTO: 17.48 K/UL — HIGH (ref 3.8–10.5)
WBC # FLD AUTO: 18.55 K/UL — HIGH (ref 3.8–10.5)

## 2019-11-19 PROCEDURE — 99291 CRITICAL CARE FIRST HOUR: CPT

## 2019-11-19 PROCEDURE — 99292 CRITICAL CARE ADDL 30 MIN: CPT

## 2019-11-19 PROCEDURE — 71045 X-RAY EXAM CHEST 1 VIEW: CPT | Mod: 26

## 2019-11-19 PROCEDURE — 99232 SBSQ HOSP IP/OBS MODERATE 35: CPT | Mod: GC

## 2019-11-19 RX ORDER — FENTANYL CITRATE 50 UG/ML
25 INJECTION INTRAVENOUS
Refills: 0 | Status: DISCONTINUED | OUTPATIENT
Start: 2019-11-19 | End: 2019-11-19

## 2019-11-19 RX ORDER — SODIUM CHLORIDE 9 MG/ML
3 INJECTION INTRAMUSCULAR; INTRAVENOUS; SUBCUTANEOUS EVERY 8 HOURS
Refills: 0 | Status: DISCONTINUED | OUTPATIENT
Start: 2019-11-19 | End: 2019-11-25

## 2019-11-19 RX ORDER — FENTANYL CITRATE 50 UG/ML
25 INJECTION INTRAVENOUS ONCE
Refills: 0 | Status: DISCONTINUED | OUTPATIENT
Start: 2019-11-19 | End: 2019-11-19

## 2019-11-19 RX ORDER — SODIUM CHLORIDE 9 MG/ML
500 INJECTION INTRAMUSCULAR; INTRAVENOUS; SUBCUTANEOUS
Refills: 0 | Status: DISCONTINUED | OUTPATIENT
Start: 2019-11-19 | End: 2019-11-20

## 2019-11-19 RX ORDER — PANTOPRAZOLE SODIUM 20 MG/1
40 TABLET, DELAYED RELEASE ORAL
Refills: 0 | Status: DISCONTINUED | OUTPATIENT
Start: 2019-11-19 | End: 2019-11-25

## 2019-11-19 RX ADMIN — PANTOPRAZOLE SODIUM 40 MILLIGRAM(S): 20 TABLET, DELAYED RELEASE ORAL at 10:43

## 2019-11-19 RX ADMIN — FENTANYL CITRATE 25 MICROGRAM(S): 50 INJECTION INTRAVENOUS at 21:45

## 2019-11-19 RX ADMIN — SODIUM CHLORIDE 3 MILLILITER(S): 9 INJECTION INTRAMUSCULAR; INTRAVENOUS; SUBCUTANEOUS at 14:20

## 2019-11-19 RX ADMIN — Medication 100 MILLIGRAM(S): at 14:20

## 2019-11-19 RX ADMIN — Medication 100 MILLIGRAM(S): at 06:45

## 2019-11-19 RX ADMIN — HEPARIN SODIUM 5000 UNIT(S): 5000 INJECTION INTRAVENOUS; SUBCUTANEOUS at 21:18

## 2019-11-19 RX ADMIN — SODIUM CHLORIDE 3 MILLILITER(S): 9 INJECTION INTRAMUSCULAR; INTRAVENOUS; SUBCUTANEOUS at 06:43

## 2019-11-19 RX ADMIN — HEPARIN SODIUM 5000 UNIT(S): 5000 INJECTION INTRAVENOUS; SUBCUTANEOUS at 14:20

## 2019-11-19 RX ADMIN — CLOPIDOGREL BISULFATE 75 MILLIGRAM(S): 75 TABLET, FILM COATED ORAL at 12:23

## 2019-11-19 RX ADMIN — FENTANYL CITRATE 25 MICROGRAM(S): 50 INJECTION INTRAVENOUS at 03:00

## 2019-11-19 RX ADMIN — HEPARIN SODIUM 5000 UNIT(S): 5000 INJECTION INTRAVENOUS; SUBCUTANEOUS at 06:45

## 2019-11-19 RX ADMIN — FENTANYL CITRATE 25 MICROGRAM(S): 50 INJECTION INTRAVENOUS at 21:18

## 2019-11-19 RX ADMIN — FENTANYL CITRATE 25 MICROGRAM(S): 50 INJECTION INTRAVENOUS at 00:29

## 2019-11-19 RX ADMIN — Medication 100 MILLIGRAM(S): at 21:18

## 2019-11-19 RX ADMIN — Medication 81 MILLIGRAM(S): at 12:23

## 2019-11-19 RX ADMIN — SODIUM CHLORIDE 3 MILLILITER(S): 9 INJECTION INTRAMUSCULAR; INTRAVENOUS; SUBCUTANEOUS at 21:06

## 2019-11-19 NOTE — PROGRESS NOTE ADULT - SUBJECTIVE AND OBJECTIVE BOX
CTICU  CRITICAL  CARE  attending     Hand off received 					   Pertinent clinical, laboratory, radiographic, hemodynamic, echocardiographic, respiratory data, microbiologic data and chart were reviewed and analyzed frequently throughout the course of the day and night  Patient seen and examined with CTS/ SH attending at bedside  Pt is a 59y , Male, HEALTH ISSUES - PROBLEM Dx:  GAUTAM (acute kidney injury): GAUTAM (acute kidney injury)      , FAMILY HISTORY:  FH: heart disease  PAST MEDICAL & SURGICAL HISTORY:  Presence of stent in LAD coronary artery  CAD in native artery  Psoriasis  Obesity  HLD (hyperlipidemia)  HTN (hypertension)  S/P CABG (coronary artery bypass graft)  H/O arthroscopy of knee  S/P cholecystectomy    Patient is a 59y old  Male who presents with a chief complaint of sternal bleeding (2019 09:22)      14 system review was unremarkable    Vital signs, hemodynamic and respiratory parameters were reviewed from the bedside nursing flowsheet.  ICU Vital Signs Last 24 Hrs  T(C): 36.4 (2019 21:51), Max: 37.1 (2019 14:13)  T(F): 97.6 (2019 21:51), Max: 98.7 (2019 14:13)  HR: 86 (2019 21:00) (76 - 92)  BP: 160/100 (2019 21:00) (127/81 - 168/94)  BP(mean): 120 (2019 21:00) (83 - 121)  ABP: --  ABP(mean): --  RR: 22 (2019 21:00) (16 - 31)  SpO2: 95% (2019 21:00) (93% - 97%)    Adult Advanced Hemodynamics Last 24 Hrs  CVP(mm Hg): --  CVP(cm H2O): --  CO: --  CI: --  PA: --  PA(mean): --  PCWP: --  SVR: --  SVRI: --  PVR: --  PVRI: --,     Intake and output was reviewed and the fluid balance was calculated  Daily     Daily Weight in k (2019 13:48)  I&O's Summary    2019 07:01  -  2019 07:00  --------------------------------------------------------  IN: 2425 mL / OUT: 3145 mL / NET: -720 mL    2019 07:01  -  2019 23:01  --------------------------------------------------------  IN: 1962.5 mL / OUT: 2200 mL / NET: -237.5 mL        All lines and drain sites were assessed  Glycemic trend was reviewedCAPILLARY BLOOD GLUCOSE        No acute change in mental status  Auscultation of the chest reveals equal bs  Abdomen is soft  Extremities are warm and well perfused  Wounds appear clean and unremarkable  Antibiotics are periop    labs  CBC Full  -  ( 2019 12:07 )  WBC Count : 18.55 K/uL  RBC Count : 3.77 M/uL  Hemoglobin : 11.1 g/dL  Hematocrit : 34.0 %  Platelet Count - Automated : 196 K/uL  Mean Cell Volume : 90.2 fl  Mean Cell Hemoglobin : 29.4 pg  Mean Cell Hemoglobin Concentration : 32.6 gm/dL  Auto Neutrophil # : x  Auto Lymphocyte # : x  Auto Monocyte # : x  Auto Eosinophil # : x  Auto Basophil # : x  Auto Neutrophil % : x  Auto Lymphocyte % : x  Auto Monocyte % : x  Auto Eosinophil % : x  Auto Basophil % : x        129<L>  |  90<L>  |  80<H>  ----------------------------<  114<H>  3.7   |  21<L>  |  9.49<H>    Ca    7.9<L>      2019 12:07  Phos  7.3       Mg     2.3         TPro  6.0  /  Alb  3.4  /  TBili  2.2<H>  /  DBili  x   /  AST  82<H>  /  ALT  260<H>  /  AlkPhos  187<H>      PT/INR - ( 2019 12:07 )   PT: 13.0 sec;   INR: 1.15          PTT - ( 2019 12:07 )  PTT:31.5 sec  The current medications were reviewed   MEDICATIONS  (STANDING):  aspirin enteric coated 81 milliGRAM(s) Oral daily  ceFAZolin   IVPB 1000 milliGRAM(s) IV Intermittent every 8 hours  clopidogrel Tablet 75 milliGRAM(s) Oral daily  heparin  Injectable 5000 Unit(s) SubCutaneous every 8 hours  pantoprazole    Tablet 40 milliGRAM(s) Oral before breakfast  sodium chloride 0.9% lock flush 3 milliLiter(s) IV Push every 8 hours  sodium chloride 0.9%. 500 milliLiter(s) (999 mL/Hr) IV Continuous <Continuous>    MEDICATIONS  (PRN):       PROBLEM LIST/ ASSESSMENT:  HEALTH ISSUES - PROBLEM Dx:  GAUTAM (acute kidney injury): GAUTAM (acute kidney injury)      ,   Patient is a 59y old  Male who presents with a chief complaint of sternal bleeding (2019 09:22)     s/p cardiac surgery              My plan includes :  close hemodynamic, ventilatory and drain monitoring and management per post op routine    Monitor for arrhythmias and monitor parameters for organ perfusion  beta blockade not administered due to hemodynamic instability and bradycardia  monitor neurologic status  Head of the bed should remain elevated to 45 deg .   chest PT and IS will be encouraged  monitor adequacy of oxygenation and ventilation and attempt to wean oxygen  antibiotic regimen will be tailored to the clinical, laboratory and microbiologic data  Nutritional goals will be met using po eventually , ensure adequate caloric intake and montior the same  Stress ulcer and VTE prophylaxis will be achieved    Glycemic control is satisfactory  Electrolytes have been repleted as necessary and wound care has been carried out. Pain control has been achieved.   agressive physical therapy and early mobility and ambulation goals will be met   The family was updated about the course and plan  CRITICAL CARE TIME personally provided by me  in evaluation and management, reassessments, review and interpretation of labs and x-rays, ventilator and hemodynamic management, formulating a plan and coordinating care: ___90____ MIN.  Time does not include procedural time.  CTICU ATTENDING     					    Jeremy Baum MD

## 2019-11-19 NOTE — DIETITIAN INITIAL EVALUATION ADULT. - ADD RECOMMEND
1. Reinforce ed prn 2. Manage pain prn 3. Liberalize diet/ remove CstCho restriction 4. Encourage intake through day 5. Trend wts

## 2019-11-19 NOTE — DIETITIAN INITIAL EVALUATION ADULT. - DIET TYPE
DASH/TLC (sodium and cholesterol restricted diet)/1000ml/renal replacement pts:no protein restr,no conc K & phos, low sodium

## 2019-11-19 NOTE — DIETITIAN INITIAL EVALUATION ADULT. - ENERGY NEEDS
Ideal body weight used for calculations as pt >120% of IBW.   .9kg, IBW 61kg, 177% IBW, ht 65", BMI 40   Nutrient needs based on [facility] standards of care for maintenance in adults, adjusted for wound vac/ HD, fluid per team

## 2019-11-19 NOTE — PROGRESS NOTE ADULT - ATTENDING COMMENTS
received NS bolus overnight- Na up to 127  cretinine still rising, but rate of rise less steep-   OOB in chair, NO SOB, CP.  non oliguric  electrolytes okay  will defer repeat HD again today- but will re assess in AM, and if creat again higher, may benefit from repeat HD since, as he becomes more azotemic, may develop a uremia related pericardial effusion (or pericarditis)  Clinically continues to improve daily-  reviewed with CTS ICU team received NS bolus overnight- Na up to 127  creatinine still rising, but rate of rise less steep-   OOB in chair, NO SOB, CP.  non oliguric  electrolytes okay  will defer repeat HD again today- but will re assess in AM, and if creat again higher, may benefit from repeat HD since, as he becomes more azotemic, may develop a uremia related pericardial effusion (or pericarditis)  Clinically continues to improve daily-  reviewed with CTS ICU team

## 2019-11-19 NOTE — DIETITIAN INITIAL EVALUATION ADULT. - OTHER INFO
59M w PMHx of HTN, HLD, obesity, psoriasis, CAD/RADHA, s/p OPCAB x 3 11/6, admitted for cardiogenic shock in the setting of pericardial effusion and tamponade s/p pericardial window and VAC placement on 11/14 his course cb liver shock and non oliguric GAUTAM associated with refractory hyperkalemia and requirement for RRT s/p one HD session 11/15. Upon exam pt resting in chair NAD, endorses good appetite at this time, wishes for greater options. Discussed importance of current diet restrictions. Would recommend removal of CstCho diet as A1C 5.1 to hopefully provided greater diet choices and enhance nutritional status. Per pt no known wt changes, per prior admission x2 weeks ago wt remains stable. Denies n/v/d/c, chewing/ swallowing issues or pain impacting intake, skin with sternal wound. Able to ambulate through halls at this time pending DC home with wound vac, pending set up. Will continue to follow per protocol.

## 2019-11-19 NOTE — PROGRESS NOTE ADULT - ASSESSMENT
60 yo M w PMHx of HTN, HLD, obesity, psoriasis, CAD/RADHA, s/p OPCAB x 3 11/6, admitted for cardiogenci shock in the setting of pericardial effusion and tamponade s/p pericardial window and VAC placement on 11/14 his course cb shock liver and non oliguric GAUTAM associated with refractory hyperkalemia and requirement for RRT.      # Nonoliguric GAUTAM  - likely ATN in the setting of shock associated with sever metabolic acidosis, shock liver and refractory hyperkalemia, s/p one HD session on 11/15 for clearance  - volume status noted and electrolytes noted, acceptable  - BUN/Cr trending up to 81/9.1 <-- 66/8.0 <-- 56/6.6 but continues to have adequate UOP  cc/hr  - not uremic with acceptable lytes, no indication for HD today, will reassess daily  - acidosis likely due to gautam  - phos 7.3, brian calcium 8.8, would recommend Phoslo 667 mg po tid w/meals, check PTH, Vit D    # Hyponatremia  - Na 127 <-- 124, serum osm 288  - would recommend 0.9NaCl at 75 cc/hr, limit free water access  - monitor bmp Q4-6hr      Discussed with attending Dr Reyes.

## 2019-11-19 NOTE — PROGRESS NOTE ADULT - SUBJECTIVE AND OBJECTIVE BOX
no acute events overnight  feels better, denies any active complains at present  sitting in the chair, breathing comfortably on RA, sat 96%  cowart in place, uop  cc/hr  BUN/Cr trending up to 81/9.1, Na 127, K 3.9, Phos 7.3, CO2 21          Meds:  aspirin enteric coated 81 milliGRAM(s) Oral daily  ceFAZolin   IVPB 1000 milliGRAM(s) IV Intermittent every 8 hours  clopidogrel Tablet 75 milliGRAM(s) Oral daily  heparin  Injectable 5000 Unit(s) SubCutaneous every 8 hours  pantoprazole    Tablet 40 milliGRAM(s) Oral before breakfast  sodium chloride 0.9% lock flush 3 milliLiter(s) IV Push every 8 hours  sodium chloride 0.9%. 500 milliLiter(s) IV Continuous <Continuous>      T(C): 36.9 (11-19-19 @ 08:55), Max: 36.9 (11-19-19 @ 08:55)  HR: 92 (11-19-19 @ 10:00) (76 - 99)  BP: 140/82 (11-19-19 @ 09:00) (127/81 - 161/94)  RR: 20 (11-19-19 @ 10:00) (18 - 34)  SpO2: 96% (11-19-19 @ 10:00) (93% - 97%)    Input/Output      11-18-19 @ 07:01  -  11-19-19 @ 07:00  --------------------------------------------------------  IN: 2425 mL / OUT: 3145 mL / NET: -720 mL    11-19-19 @ 07:01  -  11-19-19 @ 10:23  --------------------------------------------------------  IN: 0 mL / OUT: 430 mL / NET: -430 mL        PHYSICAL EXAM  GENERAL: NAD, alert  CHEST/LUNG: equal breath sounds bilateral, diminished at bases   HEART: normal S1S2, RRR  ABDOMEN: Soft, NT/ND  EXTREMITIES: Warm, No peripheral edema  Neurology: awake and alert, nonfocal   ACCESS: [facility], site dry and clean        LABS:                                      10.5   17.48 )-----------( 170      ( 19 Nov 2019 05:46 )             31.6       11-19    127<L>  |  88<L>  |  81<H>  ----------------------------<  99  3.9   |  21<L>  |  9.17<H>    Ca    7.9<L>      19 Nov 2019 04:16  Phos  7.3     11-19  Mg     2.3     11-19    TPro  5.7<L>  /  Alb  2.9<L>  /  TBili  2.6<H>  /  DBili  x   /  AST  110<H>  /  ALT  526<H>  /  AlkPhos  192<H>  11-19      PT/INR - ( 19 Nov 2019 04:17 )   PT: 13.5 sec;   INR: 1.19          PTT - ( 19 Nov 2019 04:17 )  PTT:34.4 sec                             RADIOLOGY & ADDITIONAL STUDIES:    < from: Xray Chest 1 View- PORTABLE-Routine (11.18.19 @ 03:21) >  EXAM:  XR CHEST PORTABLE ROUTINE 1V                          PROCEDURE DATE:  11/18/2019          INTERPRETATION:  Clinical history/reason for exam: Postop.    Single frontal view.    Comparison: November 17, 2019.    Findings/  impression: Stable heart size status post median sternotomy. Lung   pathology, stable. Right azygos lobe developmental variant. Stable bony   structures.      < end of copied text >

## 2019-11-20 LAB
ALBUMIN SERPL ELPH-MCNC: 3.2 G/DL — LOW (ref 3.3–5)
ALP SERPL-CCNC: 176 U/L — HIGH (ref 40–120)
ALT FLD-CCNC: 39 U/L — SIGNIFICANT CHANGE UP (ref 10–45)
ANION GAP SERPL CALC-SCNC: 18 MMOL/L — HIGH (ref 5–17)
ANION GAP SERPL CALC-SCNC: 18 MMOL/L — HIGH (ref 5–17)
APTT BLD: 22.1 SEC — LOW (ref 27.5–36.3)
AST SERPL-CCNC: 54 U/L — HIGH (ref 10–40)
BILIRUB SERPL-MCNC: 1.8 MG/DL — HIGH (ref 0.2–1.2)
BUN SERPL-MCNC: 83 MG/DL — HIGH (ref 7–23)
BUN SERPL-MCNC: 85 MG/DL — HIGH (ref 7–23)
CALCIUM SERPL-MCNC: 7.9 MG/DL — LOW (ref 8.4–10.5)
CALCIUM SERPL-MCNC: 8 MG/DL — LOW (ref 8.4–10.5)
CHLORIDE SERPL-SCNC: 91 MMOL/L — LOW (ref 96–108)
CHLORIDE SERPL-SCNC: 92 MMOL/L — LOW (ref 96–108)
CO2 SERPL-SCNC: 20 MMOL/L — LOW (ref 22–31)
CO2 SERPL-SCNC: 21 MMOL/L — LOW (ref 22–31)
CREAT SERPL-MCNC: 10 MG/DL — HIGH (ref 0.5–1.3)
CREAT SERPL-MCNC: 9.72 MG/DL — HIGH (ref 0.5–1.3)
GLUCOSE SERPL-MCNC: 126 MG/DL — HIGH (ref 70–99)
GLUCOSE SERPL-MCNC: 90 MG/DL — SIGNIFICANT CHANGE UP (ref 70–99)
HCT VFR BLD CALC: 33.4 % — LOW (ref 39–50)
HGB BLD-MCNC: 10.5 G/DL — LOW (ref 13–17)
INR BLD: 1.09 — SIGNIFICANT CHANGE UP (ref 0.88–1.16)
LACTATE SERPL-SCNC: 1.3 MMOL/L — SIGNIFICANT CHANGE UP (ref 0.5–2)
MAGNESIUM SERPL-MCNC: 2.3 MG/DL — SIGNIFICANT CHANGE UP (ref 1.6–2.6)
MCHC RBC-ENTMCNC: 29.2 PG — SIGNIFICANT CHANGE UP (ref 27–34)
MCHC RBC-ENTMCNC: 31.4 GM/DL — LOW (ref 32–36)
MCV RBC AUTO: 93 FL — SIGNIFICANT CHANGE UP (ref 80–100)
NRBC # BLD: 0 /100 WBCS — SIGNIFICANT CHANGE UP (ref 0–0)
OSMOLALITY SERPL: 302 MOSMOL/KG — HIGH (ref 280–301)
PHOSPHATE SERPL-MCNC: 7.8 MG/DL — HIGH (ref 2.5–4.5)
PLATELET # BLD AUTO: 177 K/UL — SIGNIFICANT CHANGE UP (ref 150–400)
POTASSIUM SERPL-MCNC: 3.6 MMOL/L — SIGNIFICANT CHANGE UP (ref 3.5–5.3)
POTASSIUM SERPL-MCNC: 3.7 MMOL/L — SIGNIFICANT CHANGE UP (ref 3.5–5.3)
POTASSIUM SERPL-SCNC: 3.6 MMOL/L — SIGNIFICANT CHANGE UP (ref 3.5–5.3)
POTASSIUM SERPL-SCNC: 3.7 MMOL/L — SIGNIFICANT CHANGE UP (ref 3.5–5.3)
PROT SERPL-MCNC: 5.8 G/DL — LOW (ref 6–8.3)
PROTHROM AB SERPL-ACNC: 12.4 SEC — SIGNIFICANT CHANGE UP (ref 10–12.9)
RBC # BLD: 3.59 M/UL — LOW (ref 4.2–5.8)
RBC # FLD: 14.4 % — SIGNIFICANT CHANGE UP (ref 10.3–14.5)
SODIUM SERPL-SCNC: 129 MMOL/L — LOW (ref 135–145)
SODIUM SERPL-SCNC: 131 MMOL/L — LOW (ref 135–145)
WBC # BLD: 17.28 K/UL — HIGH (ref 3.8–10.5)
WBC # FLD AUTO: 17.28 K/UL — HIGH (ref 3.8–10.5)

## 2019-11-20 PROCEDURE — 71045 X-RAY EXAM CHEST 1 VIEW: CPT | Mod: 26

## 2019-11-20 PROCEDURE — 99232 SBSQ HOSP IP/OBS MODERATE 35: CPT

## 2019-11-20 RX ORDER — FENTANYL CITRATE 50 UG/ML
25 INJECTION INTRAVENOUS ONCE
Refills: 0 | Status: DISCONTINUED | OUTPATIENT
Start: 2019-11-20 | End: 2019-11-20

## 2019-11-20 RX ORDER — METOPROLOL TARTRATE 50 MG
25 TABLET ORAL ONCE
Refills: 0 | Status: COMPLETED | OUTPATIENT
Start: 2019-11-20 | End: 2019-11-20

## 2019-11-20 RX ORDER — OXYCODONE HYDROCHLORIDE 5 MG/1
5 TABLET ORAL EVERY 6 HOURS
Refills: 0 | Status: DISCONTINUED | OUTPATIENT
Start: 2019-11-20 | End: 2019-11-20

## 2019-11-20 RX ORDER — OXYCODONE HYDROCHLORIDE 5 MG/1
5 TABLET ORAL EVERY 6 HOURS
Refills: 0 | Status: DISCONTINUED | OUTPATIENT
Start: 2019-11-20 | End: 2019-11-25

## 2019-11-20 RX ORDER — SODIUM CHLORIDE 9 MG/ML
1000 INJECTION INTRAMUSCULAR; INTRAVENOUS; SUBCUTANEOUS
Refills: 0 | Status: DISCONTINUED | OUTPATIENT
Start: 2019-11-20 | End: 2019-11-20

## 2019-11-20 RX ADMIN — Medication 25 MILLIGRAM(S): at 22:14

## 2019-11-20 RX ADMIN — OXYCODONE HYDROCHLORIDE 5 MILLIGRAM(S): 5 TABLET ORAL at 14:50

## 2019-11-20 RX ADMIN — FENTANYL CITRATE 25 MICROGRAM(S): 50 INJECTION INTRAVENOUS at 09:30

## 2019-11-20 RX ADMIN — SODIUM CHLORIDE 75 MILLILITER(S): 9 INJECTION INTRAMUSCULAR; INTRAVENOUS; SUBCUTANEOUS at 01:13

## 2019-11-20 RX ADMIN — HEPARIN SODIUM 7500 UNIT(S): 5000 INJECTION INTRAVENOUS; SUBCUTANEOUS at 22:15

## 2019-11-20 RX ADMIN — OXYCODONE HYDROCHLORIDE 5 MILLIGRAM(S): 5 TABLET ORAL at 23:00

## 2019-11-20 RX ADMIN — FENTANYL CITRATE 25 MICROGRAM(S): 50 INJECTION INTRAVENOUS at 17:26

## 2019-11-20 RX ADMIN — PANTOPRAZOLE SODIUM 40 MILLIGRAM(S): 20 TABLET, DELAYED RELEASE ORAL at 06:32

## 2019-11-20 RX ADMIN — Medication 100 MILLIGRAM(S): at 11:07

## 2019-11-20 RX ADMIN — Medication 100 MILLIGRAM(S): at 05:53

## 2019-11-20 RX ADMIN — HEPARIN SODIUM 7500 UNIT(S): 5000 INJECTION INTRAVENOUS; SUBCUTANEOUS at 13:37

## 2019-11-20 RX ADMIN — OXYCODONE HYDROCHLORIDE 5 MILLIGRAM(S): 5 TABLET ORAL at 22:19

## 2019-11-20 RX ADMIN — SODIUM CHLORIDE 3 MILLILITER(S): 9 INJECTION INTRAMUSCULAR; INTRAVENOUS; SUBCUTANEOUS at 05:10

## 2019-11-20 RX ADMIN — Medication 81 MILLIGRAM(S): at 09:29

## 2019-11-20 RX ADMIN — CLOPIDOGREL BISULFATE 75 MILLIGRAM(S): 75 TABLET, FILM COATED ORAL at 09:30

## 2019-11-20 RX ADMIN — HEPARIN SODIUM 5000 UNIT(S): 5000 INJECTION INTRAVENOUS; SUBCUTANEOUS at 05:53

## 2019-11-20 RX ADMIN — FENTANYL CITRATE 25 MICROGRAM(S): 50 INJECTION INTRAVENOUS at 03:41

## 2019-11-20 RX ADMIN — SODIUM CHLORIDE 3 MILLILITER(S): 9 INJECTION INTRAMUSCULAR; INTRAVENOUS; SUBCUTANEOUS at 10:53

## 2019-11-20 RX ADMIN — SODIUM CHLORIDE 3 MILLILITER(S): 9 INJECTION INTRAMUSCULAR; INTRAVENOUS; SUBCUTANEOUS at 21:49

## 2019-11-20 NOTE — PROGRESS NOTE ADULT - ATTENDING COMMENTS
non oliguric ATN  rate of rise again lessened-  okay to defer dialysis again today  pt without uremic signs or symptoms

## 2019-11-20 NOTE — PROGRESS NOTE ADULT - ASSESSMENT
60 yo M w PMHx of HTN, HLD, obesity, psoriasis, CAD/RADHA, s/p OPCAB x 3 11/6, admitted for cardiogenci shock in the setting of pericardial effusion and tamponade s/p pericardial window and VAC placement on 11/14 his course cb shock liver and non oliguric GAUTAM associated with refractory hyperkalemia and requirement for RRT.      # Nonoliguric GAUTAM  - likely ATN in the setting of shock associated with sever metabolic acidosis, shock liver and refractory hyperkalemia, s/p one HD session on 11/15 for clearance  - volume status noted and electrolytes noted, Na 129, K 3.7, CO2 20, acceptable   - BUN/Cr gradually is trending up to 83/10.0 from 80/9.4 <-- 81/9.1 <-- 66/8.0 <-- 56/6.6, still maintains adequate -150 cc/hr  - no uremic symptoms with acceptable lytes, no indication for HD today, will reassess daily  - acidosis likely due to gautam  - rising phos 7.8 in setting of gautam, would recommend Phoslo 667 mg po tid w/meals, check PTH, Vit D    # Hyponatremia  - Na 129 <--127 <-- 124  - would stop 0.9NaCl at 75 cc/hr  - encourage oral intake  - monitor bmp Q4-6hr      Discussed with attending Dr Reyes.

## 2019-11-20 NOTE — PROGRESS NOTE ADULT - SUBJECTIVE AND OBJECTIVE BOX
no acute events overnight  no active complains at present  BUN/Cr gradually is trending up to 83/10.0, Na 129, K 3.7, Phos 7.8, CO2 20  cowart in place, maintaining adequate uop at 125-150 cc/hr  on NS at 75 cc/hr        Meds:    aspirin enteric coated 81 milliGRAM(s) Oral daily  ceFAZolin   IVPB 1000 milliGRAM(s) IV Intermittent every 24 hours  clopidogrel Tablet 75 milliGRAM(s) Oral daily  heparin  Injectable 7500 Unit(s) SubCutaneous every 8 hours  pantoprazole    Tablet 40 milliGRAM(s) Oral before breakfast  sodium chloride 0.9% lock flush 3 milliLiter(s) IV Push every 8 hours  sodium chloride 0.9%. 500 milliLiter(s) IV Continuous <Continuous>  sodium chloride 0.9%. 1000 milliLiter(s) IV Continuous <Continuous>      T(C): 36.8 (11-20-19 @ 10:00), Max: 37.1 (11-19-19 @ 14:13)  HR: 88 (11-20-19 @ 08:40) (80 - 92)  BP: 149/89 (11-20-19 @ 08:40) (144/87 - 168/94)  RR: 18 (11-20-19 @ 08:40) (16 - 29)  SpO2: 96% (11-20-19 @ 08:40) (92% - 97%)    Input/Output      11-19-19 @ 07:01  -  11-20-19 @ 07:00  --------------------------------------------------------  IN: 2612.5 mL / OUT: 3390 mL / NET: -777.5 mL    11-20-19 @ 07:01  -  11-20-19 @ 10:31  --------------------------------------------------------  IN: 75 mL / OUT: 0 mL / NET: 75 mL        PHYSICAL EXAM  GENERAL: NAD, alert  CHEST/LUNG: equal breath sounds bilateral, diminished at bases   HEART: normal S1S2, RRR, no pericardiac rub  ABDOMEN: Soft, NT/ND  : Cowart in place, uop 125-150 cc/hr  EXTREMITIES: Warm, No peripheral edema  Neurology: awake and alert, nonfocal, no asterixis  ACCESS: [facility], site dry and clean        LABS:                                                      10.5   17.28 )-----------( 177      ( 20 Nov 2019 06:32 )             33.4       11-20    129<L>  |  91<L>  |  83<H>  ----------------------------<  90  3.7   |  20<L>  |  10.00<H>    Ca    7.9<L>      20 Nov 2019 06:32  Phos  7.8     11-20  Mg     2.3     11-20    TPro  5.8<L>  /  Alb  3.2<L>  /  TBili  1.8<H>  /  DBili  x   /  AST  54<H>  /  ALT  39  /  AlkPhos  176<H>  11-20      PT/INR - ( 20 Nov 2019 06:32 )   PT: 12.4 sec;   INR: 1.09          PTT - ( 20 Nov 2019 06:32 )  PTT:22.1 sec                    RADIOLOGY & ADDITIONAL STUDIES:    reviewed

## 2019-11-20 NOTE — PROGRESS NOTE ADULT - SUBJECTIVE AND OBJECTIVE BOX
Patient discussed on morning rounds with Dr. Hammond       Readmission Date: 11/14/2019 - Drainage of pericardial effusion/Sternal wound locally opened with wound vac in placed (last changed yesterday 11/20/2019)   Operation / Date: 11/6/2019 - OPCABx3 EF 45%     SUBJECTIVE ASSESSMENT:  Patient says he is feeling well this morning. Overnight he did states he has had left sided pain that got worse with laying down. He moved his bowels this morning. Has been using his IS regularly. Denies any CP, palpitations, abdominal pains, N/V/D/C, fevers or chills.     Vital Signs Last 24 Hrs  T(C): 36.8 (20 Nov 2019 13:45), Max: 36.9 (19 Nov 2019 17:11)  T(F): 98.2 (20 Nov 2019 13:45), Max: 98.4 (19 Nov 2019 17:11)  HR: 85 (20 Nov 2019 13:47) (80 - 98)  BP: 156/100 (20 Nov 2019 13:47) (144/87 - 175/95)  BP(mean): 127 (20 Nov 2019 09:35) (102 - 130)  RR: 18 (20 Nov 2019 13:47) (16 - 24)  SpO2: 96% (20 Nov 2019 13:47) (92% - 96%)  I&O's Detail    19 Nov 2019 07:01  -  20 Nov 2019 07:00  --------------------------------------------------------  IN:    IV PiggyBack: 200 mL    Oral Fluid: 450 mL    sodium chloride 0.9%: 1237.5 mL    sodium chloride 0.9%: 225 mL    Sodium Chloride 0.9% IV Bolus: 500 mL  Total IN: 2612.5 mL    OUT:    Ureteral Catheter: 3330 mL    VAC (Vacuum Assisted Closure) System: 60 mL  Total OUT: 3390 mL    Total NET: -777.5 mL      20 Nov 2019 07:01  -  20 Nov 2019 14:13  --------------------------------------------------------  IN:    sodium chloride 0.9%: 75 mL  Total IN: 75 mL    OUT:    Ureteral Catheter: 400 mL  Total OUT: 400 mL    Total NET: -325 mL    CHEST TUBE:  No.   YI DRAIN:  No.  EPICARDIAL WIRES: No.  TIE DOWNS: No.  IVORY: Yes.     PHYSICAL EXAM:    General: Well-appearing sitting up in a chair at the bedside.   Neurological: AOx3. Motor skills are grossly in-tact.   Cardiovascular: Normal S1/S2. RRR. No M/R/G   Respiratory: Lungs are CTA b/l. No W/R/R.  Gastrointestinal: +BS in 4 quadrants. Soft. Non-tender. Non-distended.   Extremities: Strength 5/5 b/l upper/lower extremities.   Vascular: Radial 2+ b/l. DP 2+ b/l.   Incision Sites: Sternotomy incision with wound vac in place without erythema, purulence, or ecchymosis.     LABS:                        10.5   17.28 )-----------( 177      ( 20 Nov 2019 06:32 )             33.4       COUMADIN:  No .    PT/INR - ( 20 Nov 2019 06:32 )   PT: 12.4 sec;   INR: 1.09          PTT - ( 20 Nov 2019 06:32 )  PTT:22.1 sec    11-20    129<L>  |  91<L>  |  83<H>  ----------------------------<  90  3.7   |  20<L>  |  10.00<H>    Ca    7.9<L>      20 Nov 2019 06:32  Phos  7.8     11-20  Mg     2.3     11-20    TPro  5.8<L>  /  Alb  3.2<L>  /  TBili  1.8<H>  /  DBili  x   /  AST  54<H>  /  ALT  39  /  AlkPhos  176<H>  11-20          MEDICATIONS  (STANDING):  aspirin enteric coated 81 milliGRAM(s) Oral daily  ceFAZolin   IVPB 1000 milliGRAM(s) IV Intermittent every 24 hours  clopidogrel Tablet 75 milliGRAM(s) Oral daily  heparin  Injectable 7500 Unit(s) SubCutaneous every 8 hours  oxyCODONE    IR 5 milliGRAM(s) Oral every 6 hours  pantoprazole    Tablet 40 milliGRAM(s) Oral before breakfast  sodium chloride 0.9% lock flush 3 milliLiter(s) IV Push every 8 hours    MEDICATIONS  (PRN):        RADIOLOGY & ADDITIONAL TESTS:  < from: Xray Chest 1 View- PORTABLE-Routine (11.18.19 @ 03:21) >  Findings/  impression: Stable heart size status post median sternotomy. Lung   pathology, stable. Right azygos lobe developmental variant. Stable bony   structures.    < end of copied text > Patient discussed on morning rounds with Dr. Hammond       Readmission Date: 11/14/2019 - Drainage of pericardial effusion/Sternal wound locally opened with wound vac in placed (last changed yesterday 11/20/2019)   Operation / Date: 11/6/2019 - OPCABx3 EF 45%     SUBJECTIVE ASSESSMENT:  Patient says he is feeling well this morning. Overnight he did states he had left sharp chest pain radiating to the side that comes and goes. The pain is worse with laying down to sleep at night. He states the pain gets better with sitting up. He moved his bowels this morning. Has been using his IS regularly. Denies any chest pressure, SOB, palpitations, abdominal pains, N/V/D/C, fevers or chills.     Vital Signs Last 24 Hrs  T(C): 36.8 (20 Nov 2019 13:45), Max: 36.9 (19 Nov 2019 17:11)  T(F): 98.2 (20 Nov 2019 13:45), Max: 98.4 (19 Nov 2019 17:11)  HR: 85 (20 Nov 2019 13:47) (80 - 98)  BP: 156/100 (20 Nov 2019 13:47) (144/87 - 175/95)  BP(mean): 127 (20 Nov 2019 09:35) (102 - 130)  RR: 18 (20 Nov 2019 13:47) (16 - 24)  SpO2: 96% (20 Nov 2019 13:47) (92% - 96%)  I&O's Detail    19 Nov 2019 07:01  -  20 Nov 2019 07:00  --------------------------------------------------------  IN:    IV PiggyBack: 200 mL    Oral Fluid: 450 mL    sodium chloride 0.9%: 1237.5 mL    sodium chloride 0.9%: 225 mL    Sodium Chloride 0.9% IV Bolus: 500 mL  Total IN: 2612.5 mL    OUT:    Ureteral Catheter: 3330 mL    VAC (Vacuum Assisted Closure) System: 60 mL  Total OUT: 3390 mL    Total NET: -777.5 mL      20 Nov 2019 07:01  -  20 Nov 2019 14:13  --------------------------------------------------------  IN:    sodium chloride 0.9%: 75 mL  Total IN: 75 mL    OUT:    Ureteral Catheter: 400 mL  Total OUT: 400 mL    Total NET: -325 mL    CHEST TUBE:  No.   YI DRAIN:  No.  EPICARDIAL WIRES: No.  TIE DOWNS: No.  IVORY: Yes.     PHYSICAL EXAM:  General: Well-appearing sitting up in a chair at the bedside.   Neurological: AOx3. Motor skills are grossly in-tact.   Cardiovascular: Normal S1/S2. RRR. No M/R/G   Respiratory: Lungs are CTA b/l. No W/R/R.  Gastrointestinal: +BS in 4 quadrants. Soft. Non-tender. Non-distended.   Extremities: Strength 5/5 b/l upper/lower extremities.   Vascular: Radial 2+ b/l. DP 2+ b/l.   Incision Sites: Sternotomy incision with wound vac in place without erythema, purulence, or ecchymosis. Left radial incisions (2) without erythema, purulence, or ecchymosis.     LABS:                        10.5   17.28 )-----------( 177      ( 20 Nov 2019 06:32 )             33.4       COUMADIN:  No.    PT/INR - ( 20 Nov 2019 06:32 )   PT: 12.4 sec;   INR: 1.09          PTT - ( 20 Nov 2019 06:32 )  PTT:22.1 sec    11-20    129<L>  |  91<L>  |  83<H>  ----------------------------<  90  3.7   |  20<L>  |  10.00<H>    Ca    7.9<L>      20 Nov 2019 06:32  Phos  7.8     11-20  Mg     2.3     11-20    TPro  5.8<L>  /  Alb  3.2<L>  /  TBili  1.8<H>  /  DBili  x   /  AST  54<H>  /  ALT  39  /  AlkPhos  176<H>  11-20          MEDICATIONS  (STANDING):  aspirin enteric coated 81 milliGRAM(s) Oral daily  ceFAZolin   IVPB 1000 milliGRAM(s) IV Intermittent every 24 hours  clopidogrel Tablet 75 milliGRAM(s) Oral daily  heparin  Injectable 7500 Unit(s) SubCutaneous every 8 hours  oxyCODONE    IR 5 milliGRAM(s) Oral every 6 hours  pantoprazole    Tablet 40 milliGRAM(s) Oral before breakfast  sodium chloride 0.9% lock flush 3 milliLiter(s) IV Push every 8 hours    MEDICATIONS  (PRN):        RADIOLOGY & ADDITIONAL TESTS:  < from: Xray Chest 1 View- PORTABLE-Routine (11.18.19 @ 03:21) >  Findings/  impression: Stable heart size status post median sternotomy. Lung   pathology, stable. Right azygos lobe developmental variant. Stable bony   structures.    < end of copied text > Patient discussed on morning rounds with Dr. Hammond       Readmission Date: 11/14/2019 - Drainage of pericardial effusion/Sternal wound locally opened with wound vac in placed (last changed yesterday 11/20/2019)   Operation / Date: 11/6/2019 - OPCABx3 EF 45%     SUBJECTIVE ASSESSMENT:  Patient says he is feeling well this morning. Overnight he did states he had left sharp chest pain radiating to the side that comes and goes. The pain is worse with laying down to sleep at night. He states the pain gets better with sitting up. He moved his bowels this morning. Has been using his IS regularly. Denies any chest pressure, SOB, palpitations, abdominal pains, N/V/D/C, fevers or chills.     Vital Signs Last 24 Hrs  T(C): 36.8 (20 Nov 2019 13:45), Max: 36.9 (19 Nov 2019 17:11)  T(F): 98.2 (20 Nov 2019 13:45), Max: 98.4 (19 Nov 2019 17:11)  HR: 85 (20 Nov 2019 13:47) (80 - 98)  BP: 156/100 (20 Nov 2019 13:47) (144/87 - 175/95)  BP(mean): 127 (20 Nov 2019 09:35) (102 - 130)  RR: 18 (20 Nov 2019 13:47) (16 - 24)  SpO2: 96% (20 Nov 2019 13:47) (92% - 96%)  I&O's Detail    19 Nov 2019 07:01  -  20 Nov 2019 07:00  --------------------------------------------------------  IN:    IV PiggyBack: 200 mL    Oral Fluid: 450 mL    sodium chloride 0.9%: 1237.5 mL    sodium chloride 0.9%: 225 mL    Sodium Chloride 0.9% IV Bolus: 500 mL  Total IN: 2612.5 mL    OUT:    Ureteral Catheter: 3330 mL    VAC (Vacuum Assisted Closure) System: 60 mL  Total OUT: 3390 mL    Total NET: -777.5 mL      20 Nov 2019 07:01  -  20 Nov 2019 14:13  --------------------------------------------------------  IN:    sodium chloride 0.9%: 75 mL  Total IN: 75 mL    OUT:    Ureteral Catheter: 400 mL  Total OUT: 400 mL    Total NET: -325 mL    CHEST TUBE:  No.   YI DRAIN:  No.  EPICARDIAL WIRES: No.  TIE DOWNS: No.  IVORY: Yes.     PHYSICAL EXAM:  General: Well-appearing sitting up in a chair at the bedside.   Neurological: AOx3. Motor skills are grossly in-tact.   Cardiovascular: Normal S1/S2. RRR. No M/R/G   Respiratory: Lungs are CTA b/l. No W/R/R.  Gastrointestinal: +BS in 4 quadrants. Soft. Non-tender. Non-distended.   Extremities: Strength 5/5 b/l upper/lower extremities.   Vascular: Radial 2+ b/l. DP 2+ b/l.   Incision Sites: Sternotomy incision with wound vac in place without erythema, purulence, or ecchymosis. Left radial incisions (2) without erythema, purulence, or ecchymosis.     LABS:                        10.5   17.28 )-----------( 177      ( 20 Nov 2019 06:32 )             33.4       COUMADIN:  No.    PT/INR - ( 20 Nov 2019 06:32 )   PT: 12.4 sec;   INR: 1.09          PTT - ( 20 Nov 2019 06:32 )  PTT:22.1 sec    11-20    129<L>  |  91<L>  |  83<H>  ----------------------------<  90  3.7   |  20<L>  |  10.00<H>    Ca    7.9<L>      20 Nov 2019 06:32  Phos  7.8     11-20  Mg     2.3     11-20    TPro  5.8<L>  /  Alb  3.2<L>  /  TBili  1.8<H>  /  DBili  x   /  AST  54<H>  /  ALT  39  /  AlkPhos  176<H>  11-20      MEDICATIONS  (STANDING):  aspirin enteric coated 81 milliGRAM(s) Oral daily  ceFAZolin   IVPB 1000 milliGRAM(s) IV Intermittent every 24 hours  clopidogrel Tablet 75 milliGRAM(s) Oral daily  heparin  Injectable 7500 Unit(s) SubCutaneous every 8 hours  oxyCODONE    IR 5 milliGRAM(s) Oral every 6 hours  pantoprazole    Tablet 40 milliGRAM(s) Oral before breakfast  sodium chloride 0.9% lock flush 3 milliLiter(s) IV Push every 8 hours    MEDICATIONS  (PRN):        RADIOLOGY & ADDITIONAL TESTS:  < from: Xray Chest 1 View- PORTABLE-Routine (11.18.19 @ 03:21) >  Findings/  impression: Stable heart size status post median sternotomy. Lung   pathology, stable. Right azygos lobe developmental variant. Stable bony   structures.    < end of copied text >

## 2019-11-21 LAB
ALBUMIN SERPL ELPH-MCNC: 3.2 G/DL — LOW (ref 3.3–5)
ALP SERPL-CCNC: 176 U/L — HIGH (ref 40–120)
ALT FLD-CCNC: 7 U/L — LOW (ref 10–45)
ANION GAP SERPL CALC-SCNC: 18 MMOL/L — HIGH (ref 5–17)
ANION GAP SERPL CALC-SCNC: 18 MMOL/L — HIGH (ref 5–17)
AST SERPL-CCNC: 40 U/L — SIGNIFICANT CHANGE UP (ref 10–40)
BILIRUB DIRECT SERPL-MCNC: 0.9 MG/DL — HIGH (ref 0–0.2)
BILIRUB INDIRECT FLD-MCNC: 0.7 MG/DL — SIGNIFICANT CHANGE UP (ref 0.2–1)
BILIRUB SERPL-MCNC: 1.6 MG/DL — HIGH (ref 0.2–1.2)
BUN SERPL-MCNC: 85 MG/DL — HIGH (ref 7–23)
BUN SERPL-MCNC: 88 MG/DL — HIGH (ref 7–23)
CALCIUM SERPL-MCNC: 8.2 MG/DL — LOW (ref 8.4–10.5)
CALCIUM SERPL-MCNC: 8.3 MG/DL — LOW (ref 8.4–10.5)
CHLORIDE SERPL-SCNC: 93 MMOL/L — LOW (ref 96–108)
CHLORIDE SERPL-SCNC: 95 MMOL/L — LOW (ref 96–108)
CO2 SERPL-SCNC: 19 MMOL/L — LOW (ref 22–31)
CO2 SERPL-SCNC: 20 MMOL/L — LOW (ref 22–31)
CREAT SERPL-MCNC: 10.05 MG/DL — HIGH (ref 0.5–1.3)
CREAT SERPL-MCNC: 9.65 MG/DL — HIGH (ref 0.5–1.3)
GLUCOSE BLDC GLUCOMTR-MCNC: 127 MG/DL — HIGH (ref 70–99)
GLUCOSE SERPL-MCNC: 129 MG/DL — HIGH (ref 70–99)
GLUCOSE SERPL-MCNC: 95 MG/DL — SIGNIFICANT CHANGE UP (ref 70–99)
HCT VFR BLD CALC: 33.3 % — LOW (ref 39–50)
HGB BLD-MCNC: 10.4 G/DL — LOW (ref 13–17)
MAGNESIUM SERPL-MCNC: 2.3 MG/DL — SIGNIFICANT CHANGE UP (ref 1.6–2.6)
MCHC RBC-ENTMCNC: 29.1 PG — SIGNIFICANT CHANGE UP (ref 27–34)
MCHC RBC-ENTMCNC: 31.2 GM/DL — LOW (ref 32–36)
MCV RBC AUTO: 93.3 FL — SIGNIFICANT CHANGE UP (ref 80–100)
NRBC # BLD: 0 /100 WBCS — SIGNIFICANT CHANGE UP (ref 0–0)
PLATELET # BLD AUTO: 188 K/UL — SIGNIFICANT CHANGE UP (ref 150–400)
POTASSIUM SERPL-MCNC: 3.9 MMOL/L — SIGNIFICANT CHANGE UP (ref 3.5–5.3)
POTASSIUM SERPL-MCNC: 4.2 MMOL/L — SIGNIFICANT CHANGE UP (ref 3.5–5.3)
POTASSIUM SERPL-SCNC: 3.9 MMOL/L — SIGNIFICANT CHANGE UP (ref 3.5–5.3)
POTASSIUM SERPL-SCNC: 4.2 MMOL/L — SIGNIFICANT CHANGE UP (ref 3.5–5.3)
PROT SERPL-MCNC: 6.1 G/DL — SIGNIFICANT CHANGE UP (ref 6–8.3)
RBC # BLD: 3.57 M/UL — LOW (ref 4.2–5.8)
RBC # FLD: 14.5 % — SIGNIFICANT CHANGE UP (ref 10.3–14.5)
SODIUM SERPL-SCNC: 131 MMOL/L — LOW (ref 135–145)
SODIUM SERPL-SCNC: 132 MMOL/L — LOW (ref 135–145)
WBC # BLD: 17.03 K/UL — HIGH (ref 3.8–10.5)
WBC # FLD AUTO: 17.03 K/UL — HIGH (ref 3.8–10.5)

## 2019-11-21 PROCEDURE — 71045 X-RAY EXAM CHEST 1 VIEW: CPT | Mod: 26

## 2019-11-21 PROCEDURE — 76770 US EXAM ABDO BACK WALL COMP: CPT | Mod: 26

## 2019-11-21 PROCEDURE — 99232 SBSQ HOSP IP/OBS MODERATE 35: CPT

## 2019-11-21 RX ORDER — SODIUM BICARBONATE 1 MEQ/ML
650 SYRINGE (ML) INTRAVENOUS
Refills: 0 | Status: DISCONTINUED | OUTPATIENT
Start: 2019-11-21 | End: 2019-11-24

## 2019-11-21 RX ORDER — SODIUM CHLORIDE 9 MG/ML
1000 INJECTION INTRAMUSCULAR; INTRAVENOUS; SUBCUTANEOUS
Refills: 0 | Status: DISCONTINUED | OUTPATIENT
Start: 2019-11-21 | End: 2019-11-22

## 2019-11-21 RX ORDER — SODIUM CHLORIDE 9 MG/ML
1000 INJECTION INTRAMUSCULAR; INTRAVENOUS; SUBCUTANEOUS
Refills: 0 | Status: DISCONTINUED | OUTPATIENT
Start: 2019-11-21 | End: 2019-11-21

## 2019-11-21 RX ADMIN — Medication 650 MILLIGRAM(S): at 19:21

## 2019-11-21 RX ADMIN — SODIUM CHLORIDE 3 MILLILITER(S): 9 INJECTION INTRAMUSCULAR; INTRAVENOUS; SUBCUTANEOUS at 14:49

## 2019-11-21 RX ADMIN — HEPARIN SODIUM 7500 UNIT(S): 5000 INJECTION INTRAVENOUS; SUBCUTANEOUS at 21:26

## 2019-11-21 RX ADMIN — PANTOPRAZOLE SODIUM 40 MILLIGRAM(S): 20 TABLET, DELAYED RELEASE ORAL at 06:28

## 2019-11-21 RX ADMIN — Medication 100 MILLIGRAM(S): at 12:13

## 2019-11-21 RX ADMIN — HEPARIN SODIUM 7500 UNIT(S): 5000 INJECTION INTRAVENOUS; SUBCUTANEOUS at 14:47

## 2019-11-21 RX ADMIN — HEPARIN SODIUM 7500 UNIT(S): 5000 INJECTION INTRAVENOUS; SUBCUTANEOUS at 06:28

## 2019-11-21 RX ADMIN — SODIUM CHLORIDE 3 MILLILITER(S): 9 INJECTION INTRAMUSCULAR; INTRAVENOUS; SUBCUTANEOUS at 05:58

## 2019-11-21 RX ADMIN — OXYCODONE HYDROCHLORIDE 5 MILLIGRAM(S): 5 TABLET ORAL at 19:21

## 2019-11-21 RX ADMIN — SODIUM CHLORIDE 60 MILLILITER(S): 9 INJECTION INTRAMUSCULAR; INTRAVENOUS; SUBCUTANEOUS at 12:25

## 2019-11-21 RX ADMIN — CLOPIDOGREL BISULFATE 75 MILLIGRAM(S): 75 TABLET, FILM COATED ORAL at 12:13

## 2019-11-21 RX ADMIN — SODIUM CHLORIDE 3 MILLILITER(S): 9 INJECTION INTRAMUSCULAR; INTRAVENOUS; SUBCUTANEOUS at 21:23

## 2019-11-21 RX ADMIN — Medication 81 MILLIGRAM(S): at 12:13

## 2019-11-21 NOTE — CHART NOTE - NSCHARTNOTEFT_GEN_A_CORE
Patient seen and examined at the bedside. Wound vac in place was removed. Expose incision had some serosanguinous fluid draining but was well-perfused and without purulence. Incision is 10x2.5x3. Area around incision was cleaned with 3 chlorhexidines. Wound vac sponge was cut to appropriate size to fit the incision. Sponge was secured and wound vac was attached and negative pressure was achieved. Patient tolerated the procedure well. No complications.

## 2019-11-21 NOTE — PROGRESS NOTE ADULT - ASSESSMENT
58 yo M w PMHx of HTN, HLD, obesity, psoriasis, CAD/RADHA, s/p OPCAB x 3 11/6, admitted for cardiogenci shock in the setting of pericardial effusion and tamponade s/p pericardial window and VAC placement on 11/14 his course cb shock liver and non oliguric GAUTAM associated with refractory hyperkalemia and requirement for RRT.      # Nonoliguric GAUTAM  - likely ATN in the setting of shock associated with sever metabolic acidosis, shock liver and refractory hyperkalemia, s/p one HD session on 11/15 for clearance  - BUN/Cr seems to plateau at 85/10.0, maintaining adequate uop at 100-110 cc/hr  - please get Renal US and repeat urine Na, Cr  - Na 132, K 4.2, acceptable  - no uremic symptoms with acceptable lytes, no indication for HD today, will reassess daily  - acidosis likely due to GAUTAM, would recommend starting sodium bicarb 650 mg po bid  - rising phos 7.8 in setting of GAUTAM, would recommend Phoslo 667 mg po tid w/meals, check PTH, Vit D  - ok to continue 0.9NS at 60 cc/hr  - encourage oral intake  - monitor bmp Q4-6hr      Discussed with attending Dr Reyes.

## 2019-11-21 NOTE — PROGRESS NOTE ADULT - SUBJECTIVE AND OBJECTIVE BOX
Patient discussed on morning rounds with Dr. Hammond    Readmission: 11/14/19 - pericardial effusion with IR drainage   Operation / Date: 11/6/2019 - OPCAB     SUBJECTIVE ASSESSMENT:  Pt is feeling well this morning. He has been ambulating on the unit without difficulty. Has been using his IS regularly. Denies any CP, palpitations, SOB, abdominal pain, N/V/D/C, urinary burning/pain, fevers or chills at this time.     Vital Signs Last 24 Hrs  T(C): 37 (21 Nov 2019 14:01), Max: 37.1 (21 Nov 2019 05:01)  T(F): 98.6 (21 Nov 2019 14:01), Max: 98.7 (21 Nov 2019 05:01)  HR: 84 (21 Nov 2019 12:12) (74 - 94)  BP: 158/89 (21 Nov 2019 12:12) (139/80 - 164/90)  BP(mean): 134 (21 Nov 2019 12:12) (99 - 134)  RR: 20 (21 Nov 2019 12:12) (18 - 22)  SpO2: 97% (21 Nov 2019 12:12) (94% - 99%)  I&O's Detail    20 Nov 2019 07:01  -  21 Nov 2019 07:00  --------------------------------------------------------  IN:    sodium chloride 0.9%: 75 mL  Total IN: 75 mL    OUT:    Ureteral Catheter: 2325 mL    VAC (Vacuum Assisted Closure) System: 30 mL  Total OUT: 2355 mL    Total NET: -2280 mL      21 Nov 2019 07:01  -  21 Nov 2019 14:03  --------------------------------------------------------  IN:    sodium chloride 0.9%.: 60 mL  Total IN: 60 mL    OUT:    Ureteral Catheter: 550 mL  Total OUT: 550 mL    Total NET: -490 mL      CHEST TUBE:  Yes/No. AIR LEAKS: Yes/No. Suction / H2O SEAL.   YI DRAIN:  Yes/No.  EPICARDIAL WIRES: Yes/No.  TIE DOWNS: Yes/No.  IVORY: Yes/No.    PHYSICAL EXAM:  General: Well-appearing sitting up in a chair at the bedside.   Neurological: AOx3. Motor skills are grossly in-tact.   Cardiovascular: Normal S1/S2. RRR. No M/R/G   Respiratory: Lungs are CTA b/l. No W/R/R.  Gastrointestinal: +BS in 4 quadrants. Soft. Non-tender. Non-distended.   Extremities: Strength 5/5 b/l upper/lower extremities.   Vascular: Radial 2+ b/l. DP 2+ b/l.   Incision Sites: Sternotomy incision with wound vac in place without erythema, purulence, or ecchymosis (changed today on 11/21/19). Left radial incisions (2) without erythema, purulence, or ecchymosis.     LABS:                        10.4   17.03 )-----------( 188      ( 21 Nov 2019 05:51 )             33.3       COUMADIN:  No  PT/INR - ( 20 Nov 2019 06:32 )   PT: 12.4 sec;   INR: 1.09          PTT - ( 20 Nov 2019 06:32 )  PTT:22.1 sec    11-21    132<L>  |  95<L>  |  85<H>  ----------------------------<  95  4.2   |  19<L>  |  10.05<H>    Ca    8.3<L>      21 Nov 2019 05:51  Phos  7.8     11-20  Mg     2.3     11-21    TPro  6.1  /  Alb  3.2<L>  /  TBili  1.6<H>  /  DBili  0.9<H>  /  AST  40  /  ALT  7<L>  /  AlkPhos  176<H>  11-21          MEDICATIONS  (STANDING):  aspirin enteric coated 81 milliGRAM(s) Oral daily  ceFAZolin   IVPB 1000 milliGRAM(s) IV Intermittent every 24 hours  clopidogrel Tablet 75 milliGRAM(s) Oral daily  heparin  Injectable 7500 Unit(s) SubCutaneous every 8 hours  pantoprazole    Tablet 40 milliGRAM(s) Oral before breakfast  sodium bicarbonate 650 milliGRAM(s) Oral two times a day  sodium chloride 0.9% lock flush 3 milliLiter(s) IV Push every 8 hours  sodium chloride 0.9%. 1000 milliLiter(s) (60 mL/Hr) IV Continuous <Continuous>    MEDICATIONS  (PRN):  oxyCODONE    IR 5 milliGRAM(s) Oral every 6 hours PRN Severe Pain (7 - 10)        RADIOLOGY & ADDITIONAL TESTS:  < from: Xray Chest 1 View- PORTABLE-Routine (11.19.19 @ 03:06) >  Findings/  impression: Stable heart size status post median sternotomy. Lung   pathology, stable. Stable bony structures.    < end of copied text >

## 2019-11-21 NOTE — CHART NOTE - NSCHARTNOTEFT_GEN_A_CORE
Admitting Diagnosis:   Patient is a 59y old  Male who presents with a chief complaint of sternal bleeding (20 Nov 2019 14:12)      PAST MEDICAL & SURGICAL HISTORY:  Presence of stent in LAD coronary artery  CAD in native artery  Psoriasis  Obesity  HLD (hyperlipidemia)  HTN (hypertension)  S/P CABG (coronary artery bypass graft)  H/O arthroscopy of knee  S/P cholecystectomy      Current Nutrition Order:   Diet, DASH/TLC:   Sodium & Cholesterol Restricted  Consistent Carbohydrate {No Snacks} (CSTCHO)  For patients receiving Renal Replacement - No Protein Restr, No Conc K, No Conc Phos, Low Sodium (RENAL) (11-21-19 @ 09:55)      PO Intake: Good (%) [ x  ]  Fair (50-75%) [   ] Poor (<25%) [   ]    GI Issues: +BM 11/20, no n/v/c/d    Pain: no pain reported, feeling well    Skin Integrity: MSI with wound VAC    Labs:   11-21    132<L>  |  95<L>  |  85<H>  ----------------------------<  95  4.2   |  19<L>  |  10.05<H>    Ca    8.3<L>      21 Nov 2019 05:51  Phos  7.8     11-20  Mg     2.3     11-21    TPro  6.1  /  Alb  3.2<L>  /  TBili  1.6<H>  /  DBili  0.9<H>  /  AST  40  /  ALT  7<L>  /  AlkPhos  176<H>  11-21    CAPILLARY BLOOD GLUCOSE          Medications:  MEDICATIONS  (STANDING):  aspirin enteric coated 81 milliGRAM(s) Oral daily  ceFAZolin   IVPB 1000 milliGRAM(s) IV Intermittent every 24 hours  clopidogrel Tablet 75 milliGRAM(s) Oral daily  heparin  Injectable 7500 Unit(s) SubCutaneous every 8 hours  pantoprazole    Tablet 40 milliGRAM(s) Oral before breakfast  sodium bicarbonate 650 milliGRAM(s) Oral two times a day  sodium chloride 0.9% lock flush 3 milliLiter(s) IV Push every 8 hours  sodium chloride 0.9%. 1000 milliLiter(s) (60 mL/Hr) IV Continuous <Continuous>    MEDICATIONS  (PRN):  oxyCODONE    IR 5 milliGRAM(s) Oral every 6 hours PRN Severe Pain (7 - 10)      Weight:   108.9kg (11/14)  119.7kg (11/15)     Weight Change: +11kg x1 day likely in setting of fluid shifts 2/2 renal dysfunction, please continue to trend     Nutrition Focused Physical Exam: Completed [   ]  Not Pertinent [ x  ]    Estimated energy needs:   Ideal body weight used for calculations as pt >120% of IBW.   .9kg, IBW 61kg, 177% IBW, ht 65", BMI 40   Nutrient needs based on [facility] standards of care for maintenance in adults, adjusted for wound vac/ post-op needs, fluid per team   1525- 1830kcal (25-30kcal/kg)   73-85.4g pro (1.2-1.4g/kg pro)     Subjective:   59M w PMHx of HTN, HLD, obesity, psoriasis, CAD/RADHA, s/p OPCAB x 3 11/6, admitted for cardiogenic shock in the setting of pericardial effusion and tamponade s/p pericardial window and VAC placement on 11/14 his course cb liver shock and non oliguric GAUTAM associated with refractory hyperkalemia and requirement for RRT s/p one HD session 11/15, no further need for HD at this time. Upon exam pt resting in chair NAD, endorses good appetite at this time, continues to wish for greater options, frustrated with fluid restriction as feels he has not been getting any fluid with meals, noted team removed restriction. Would again recommend removal of CstCho diet as A1C 5.1 to hopefully provided greater diet choices and enhance nutritional status, BS have been well managed + renal diet is currently very restrictive. VAC changed 11/20, continues to pend home care set up for management. Observed 100% of breakfast tray consumed, moving bowels, pain being managed. Again reviewed diet restrictions with pt and importance of adherence.     Previous Nutrition Diagnosis: Increased nutrient needs r/t wound vac/ healing AEB hypermetabolic state     Active [x   ]  Resolved [   ]    If resolved, new PES:     Goal: meet >75% EER     Recommendations:  1. Reinforce ed prn   2. Manage pain prn   3. Liberalize diet/ remove CstCho restriction   4. Encourage intake through day   5. Trend wts    Education: encouraged intake and reinforced ed     Risk Level: High [ x  ] Moderate [   ] Low [   ]

## 2019-11-21 NOTE — PROGRESS NOTE ADULT - ATTENDING COMMENTS
non oliguric aTN  creat now peaked  Na,K acceptable  CO2 down to 19- add bicarb  okay to continue to defer dialysis  expect ongoing improvement of creat  from this point  gentle IVFs good  reviewed with family and 9 L team

## 2019-11-21 NOTE — PROGRESS NOTE ADULT - ASSESSMENT
This is a 58 y/o male with a PMHx of HTN, HLD, obesity, psorasis, cholecystectomy and known CAD (s/p PTCA/RADHA LAD & D1) presented to his cardiologist with chest pain. He underwent a cardiac cath which revealed 3-vessel CAD. Patient was referred to [facility] for surgical evaluation and deemed a good surgical candidate. On 11/6/2019 the patient underwent an OPCABx3. His OR course was uncomplicated and the pt arrived to the [facility] extubated. On POD1-5 patient had a small air leak in his pleural CT and underwent a few waterseal trials. On POD 6 the airleak was gone and the chest tube was removed and later that day the pt was discharged later that day. On 11/14/2019, the patient presented to [facility] with complaints of severe anxiety and SOB and in the ED they found K>6, BUN/Cr 36/3.22, AST/ALT >5000, Lactate 13 and bleeding was noted from sternotomy incision. The patient was transferred to the CTICU emergently for evaluation and found to have cardiogenic shock and multiorgan failure. In the CTICU Patient was found to have a circumferential pericardial effusion and the sternal incision was opened at the bedside (drained 700cc of fluid) and a wound vac was placed. Patient required emergent hemodialysis due to persistent acidosis/hyperkalemia and he improved clinically. Throughout the rest of his ICU course, he continued to have GAUTAM throughout his ICU course. He was then transferred to the floor on 11/19/2019 with a cowart and wound vac in place (last changed yesterday 11/20/19). On 11/20/2019, remained hemodynamically stable. On 11/21/19 a renal US was completed and results are pending. Patient was additionally started on sodium bicab 650 BID and in addition his wound vac was changed,. Urine sodium and urine creatinine was sent and results are pending.       Plan:   Neurovascular: Stable  -PRNs: Oxycodone 5mg    Cardiovascular: S/p OPCABx3 on 11/6/2019   -Monitor: HR, BP, tele   -Sternal wound has wound vac in place (changed today on 11/21/19)   -CAD: ASA, Plavix - Holding B-blocker for permissive HTN. Holding statin secondary to increased LFTs.     Respiratory: Stable   -Sating well on RA   -Encourage use of IS 10x / hr while awake.  -CXR: pending official read, no obvious PTX or acute pathology - f/u with results     GI: Stable.  -PPX: Pantoprazole 40mg   -PO Diet: DASH -encourage oral intake (per nephrology)   -Pt having regular bowel movements - no need for bowel regimen at this time     Renal / :   -GAUTAM: nephrology is following (in the setting of shock and metabolic acidosis with refractory hyperkalemia)- pt has had one session of HD but is not needed again yet.   -Monitor renal function: BUN/Cr 88/10.05 (Cr trended up from yesterday which was 85/9.72)  -Cowart in place: mild hematura in the bag but urine in tubing flowing is now flowing clear and yellow- will monitor for anymore hematuria   -Hyponatremia: continue to trend sodium- Na 132<--129 <-- 127 <--- 124, stop 0.9 NaCl at 75cc/ hr (per nephrology) monitor BMP Q6 hours   -Renal US completed today - f/u with results when available  -Sodium bicarbonate started today 650 mg BID (per nephrology)   -F/u with urine Cr and Na levels once available.     Endocrine:  no hx of DM or thyroid disease   -A1C: 5.4  -TSH: 1.34    Hematologic: Stable   -CBC: H/H 10.4/33.3    ID:  -Temperature: afebrile   -CBC: WBC- 17.03 (trending down)   -Observe for SIRS/Sepsis Syndrome.  -C/w Cefazolin (Empiric Abx for deep sternal wound) - surgical swab (11/14/19) and blood cultures (11/14/19) with no growth to date- PER DR. EUGENE please c/w Cefazolin indefinitely     Prophylaxis:  -DVT prophylaxis with 7500 SubQ Heparin q8h.  -SCD's    Disposition:  -Discharge the patient home when medically ready This is a 58 y/o male with a PMHx of HTN, HLD, obesity, psorasis, cholecystectomy and known CAD (s/p PTCA/RADHA LAD & D1) presented to his cardiologist with chest pain. He underwent a cardiac cath which revealed 3-vessel CAD. Patient was referred to [facility] for surgical evaluation and deemed a good surgical candidate. On 11/6/2019 the patient underwent an OPCABx3. His OR course was uncomplicated and the pt arrived to the [facility] extubated. On POD1-5 patient had a small air leak in his pleural CT and underwent a few waterseal trials. On POD 6 the airleak was gone and the chest tube was removed and later that day the pt was discharged later that day. On 11/14/2019, the patient presented to [facility] with complaints of severe anxiety and SOB and in the ED they found K>6, BUN/Cr 36/3.22, AST/ALT >5000, Lactate 13 and bleeding was noted from sternotomy incision. The patient was transferred to the CTICU emergently for evaluation and found to have cardiogenic shock and multiorgan failure. In the CTICU Patient was found to have a circumferential pericardial effusion and the sternal incision was opened at the bedside (drained 700cc of fluid) and a wound vac was placed. Patient required emergent hemodialysis due to persistent acidosis/hyperkalemia and he improved clinically. Throughout the rest of his ICU course, he continued to have GAUTAM throughout his ICU course. He was then transferred to the floor on 11/19/2019 with a cowart and wound vac in place. On 11/20/2019, remained hemodynamically stable. On 11/21/19 a renal US was completed and results are pending. Patient was additionally started on sodium bicab 650 BID and in addition his wound vac was changed,. Urine sodium and urine creatinine was sent and results are pending.       Plan:   Neurovascular: Stable  -PRNs: Oxycodone 5mg    Cardiovascular: S/p OPCABx3 on 11/6/2019   -Monitor: HR, BP, tele   -Sternal wound has wound vac in place (changed today on 11/21/19)   -CAD: ASA, Plavix - Holding B-blocker for permissive HTN. Holding statin secondary to increased LFTs.     Respiratory: Stable   -Sating well on RA   -Encourage use of IS 10x / hr while awake.  -CXR: pending official read, no obvious PTX or acute pathology - f/u with results     GI: Stable.  -PPX: Pantoprazole 40mg   -PO Diet: DASH -encourage oral intake (per nephrology)   -Pt having regular bowel movements - no need for bowel regimen at this time     Renal / :   -GAUTAM: nephrology is following (in the setting of shock and metabolic acidosis with refractory hyperkalemia)- pt has had one session of HD but is not needed again yet.   -Monitor renal function: BUN/Cr 88/10.05 (Cr trended up from yesterday which was 85/9.72)  -Cowart in place: mild hematura in the bag but urine in tubing flowing is now flowing clear and yellow- will monitor for anymore hematuria   -Hyponatremia: continue to trend sodium- Na 132<--129 <-- 127 <--- 124, stop 0.9 NaCl at 75cc/ hr (per nephrology) monitor BMP Q6 hours   -Renal US completed today - f/u with results when available  -Sodium bicarbonate started today 650 mg BID (per nephrology)   -F/u with urine Cr and Na levels once available.     Endocrine:  no hx of DM or thyroid disease   -A1C: 5.4  -TSH: 1.34    Hematologic: Stable   -CBC: H/H 10.4/33.3    ID:  -Temperature: afebrile   -CBC: WBC- 17.03 (trending down)   -Observe for SIRS/Sepsis Syndrome.  -C/w Cefazolin (Empiric Abx for deep sternal wound) - surgical swab (11/14/19) and blood cultures (11/14/19) with no growth to date- PER DR. EUGENE please c/w Cefazolin indefinitely     Prophylaxis:  -DVT prophylaxis with 7500 SubQ Heparin q8h.  -SCD's    Disposition:  -Discharge the patient home when medically ready

## 2019-11-21 NOTE — PROGRESS NOTE ADULT - SUBJECTIVE AND OBJECTIVE BOX
no acute events overnight  no active complains at present  BUN/Cr seems to plateau at 85/10.0  Na 132, K 4.2, CO2 19  Singh in place, maintaining adequate uop at 100-110 cc/hr          Meds:    aspirin enteric coated 81 milliGRAM(s) Oral daily  ceFAZolin   IVPB 1000 milliGRAM(s) IV Intermittent every 24 hours  clopidogrel Tablet 75 milliGRAM(s) Oral daily  heparin  Injectable 7500 Unit(s) SubCutaneous every 8 hours  oxyCODONE    IR 5 milliGRAM(s) Oral every 6 hours PRN  pantoprazole    Tablet 40 milliGRAM(s) Oral before breakfast  sodium bicarbonate 650 milliGRAM(s) Oral two times a day  sodium chloride 0.9% lock flush 3 milliLiter(s) IV Push every 8 hours  sodium chloride 0.9%. 1000 milliLiter(s) IV Continuous <Continuous>      T(C): 37 (11-21-19 @ 14:01), Max: 37.1 (11-21-19 @ 05:01)  HR: 84 (11-21-19 @ 12:12) (74 - 94)  BP: 158/89 (11-21-19 @ 12:12) (139/80 - 164/90)  RR: 20 (11-21-19 @ 12:12) (18 - 22)  SpO2: 97% (11-21-19 @ 12:12) (94% - 99%)    Input/Output      11-20-19 @ 07:01  -  11-21-19 @ 07:00  --------------------------------------------------------  IN: 75 mL / OUT: 2355 mL / NET: -2280 mL    11-21-19 @ 07:01  -  11-21-19 @ 14:57  --------------------------------------------------------  IN: 690 mL / OUT: 900 mL / NET: -210 mL        ROS:   Gen: no fever  Cardiovascular: no chest pain  Respiratory: no cough, no sputum  Gastrointestinal: no nausea, no vomiting, no change in bowel habits  Neurologic: no headache  : no urinary symptoms      PHYSICAL EXAM  GENERAL: NAD, alert  CHEST/LUNG: equal breath sounds bilateral, diminished at bases   HEART: normal S1S2, RRR, no pericardiac rub  ABDOMEN: Soft, NT/ND  : Singh in place, uop 125-150 cc/hr  EXTREMITIES: Warm, No peripheral edema  Neurology: awake and alert, nonfocal, no asterixis  ACCESS: [facility], site dry and clean        LABS:                                             10.4   17.03 )-----------( 188      ( 21 Nov 2019 05:51 )             33.3       11-21    132<L>  |  95<L>  |  85<H>  ----------------------------<  95  4.2   |  19<L>  |  10.05<H>    Ca    8.3<L>      21 Nov 2019 05:51  Phos  7.8     11-20  Mg     2.3     11-21    TPro  6.1  /  Alb  3.2<L>  /  TBili  1.6<H>  /  DBili  0.9<H>  /  AST  40  /  ALT  7<L>  /  AlkPhos  176<H>  11-21      PT/INR - ( 20 Nov 2019 06:32 )   PT: 12.4 sec;   INR: 1.09          PTT - ( 20 Nov 2019 06:32 )  PTT:22.1 sec                                            RADIOLOGY & ADDITIONAL STUDIES:    reviewed

## 2019-11-22 LAB
ALBUMIN SERPL ELPH-MCNC: 3.4 G/DL — SIGNIFICANT CHANGE UP (ref 3.3–5)
ALP SERPL-CCNC: 177 U/L — HIGH (ref 40–120)
ALT FLD-CCNC: <5 U/L — LOW (ref 10–45)
ANION GAP SERPL CALC-SCNC: 19 MMOL/L — HIGH (ref 5–17)
AST SERPL-CCNC: 32 U/L — SIGNIFICANT CHANGE UP (ref 10–40)
BILIRUB SERPL-MCNC: 1.4 MG/DL — HIGH (ref 0.2–1.2)
BUN SERPL-MCNC: 90 MG/DL — HIGH (ref 7–23)
CALCIUM SERPL-MCNC: 8.4 MG/DL — SIGNIFICANT CHANGE UP (ref 8.4–10.5)
CHLORIDE SERPL-SCNC: 94 MMOL/L — LOW (ref 96–108)
CO2 SERPL-SCNC: 20 MMOL/L — LOW (ref 22–31)
CREAT ?TM UR-MCNC: 67 MG/DL — SIGNIFICANT CHANGE UP
CREAT SERPL-MCNC: 9.71 MG/DL — HIGH (ref 0.5–1.3)
GLUCOSE SERPL-MCNC: 97 MG/DL — SIGNIFICANT CHANGE UP (ref 70–99)
HCT VFR BLD CALC: 32.5 % — LOW (ref 39–50)
HGB BLD-MCNC: 10.2 G/DL — LOW (ref 13–17)
MAGNESIUM SERPL-MCNC: 2.4 MG/DL — SIGNIFICANT CHANGE UP (ref 1.6–2.6)
MCHC RBC-ENTMCNC: 29.3 PG — SIGNIFICANT CHANGE UP (ref 27–34)
MCHC RBC-ENTMCNC: 31.4 GM/DL — LOW (ref 32–36)
MCV RBC AUTO: 93.4 FL — SIGNIFICANT CHANGE UP (ref 80–100)
NRBC # BLD: 0 /100 WBCS — SIGNIFICANT CHANGE UP (ref 0–0)
PLATELET # BLD AUTO: 195 K/UL — SIGNIFICANT CHANGE UP (ref 150–400)
POTASSIUM SERPL-MCNC: 3.8 MMOL/L — SIGNIFICANT CHANGE UP (ref 3.5–5.3)
POTASSIUM SERPL-SCNC: 3.8 MMOL/L — SIGNIFICANT CHANGE UP (ref 3.5–5.3)
PROT SERPL-MCNC: 6.7 G/DL — SIGNIFICANT CHANGE UP (ref 6–8.3)
RBC # BLD: 3.48 M/UL — LOW (ref 4.2–5.8)
RBC # FLD: 14.8 % — HIGH (ref 10.3–14.5)
SODIUM SERPL-SCNC: 133 MMOL/L — LOW (ref 135–145)
SODIUM UR-SCNC: 44 MMOL/L — SIGNIFICANT CHANGE UP
WBC # BLD: 17.83 K/UL — HIGH (ref 3.8–10.5)
WBC # FLD AUTO: 17.83 K/UL — HIGH (ref 3.8–10.5)

## 2019-11-22 PROCEDURE — 71045 X-RAY EXAM CHEST 1 VIEW: CPT | Mod: 26

## 2019-11-22 PROCEDURE — 99232 SBSQ HOSP IP/OBS MODERATE 35: CPT

## 2019-11-22 RX ORDER — METOPROLOL TARTRATE 50 MG
12.5 TABLET ORAL EVERY 12 HOURS
Refills: 0 | Status: DISCONTINUED | OUTPATIENT
Start: 2019-11-22 | End: 2019-11-25

## 2019-11-22 RX ADMIN — SODIUM CHLORIDE 3 MILLILITER(S): 9 INJECTION INTRAMUSCULAR; INTRAVENOUS; SUBCUTANEOUS at 05:14

## 2019-11-22 RX ADMIN — SODIUM CHLORIDE 3 MILLILITER(S): 9 INJECTION INTRAMUSCULAR; INTRAVENOUS; SUBCUTANEOUS at 21:00

## 2019-11-22 RX ADMIN — HEPARIN SODIUM 7500 UNIT(S): 5000 INJECTION INTRAVENOUS; SUBCUTANEOUS at 05:18

## 2019-11-22 RX ADMIN — Medication 81 MILLIGRAM(S): at 13:09

## 2019-11-22 RX ADMIN — Medication 100 MILLIGRAM(S): at 13:08

## 2019-11-22 RX ADMIN — OXYCODONE HYDROCHLORIDE 5 MILLIGRAM(S): 5 TABLET ORAL at 01:00

## 2019-11-22 RX ADMIN — Medication 12.5 MILLIGRAM(S): at 19:22

## 2019-11-22 RX ADMIN — PANTOPRAZOLE SODIUM 40 MILLIGRAM(S): 20 TABLET, DELAYED RELEASE ORAL at 07:12

## 2019-11-22 RX ADMIN — CLOPIDOGREL BISULFATE 75 MILLIGRAM(S): 75 TABLET, FILM COATED ORAL at 13:09

## 2019-11-22 RX ADMIN — Medication 650 MILLIGRAM(S): at 18:12

## 2019-11-22 RX ADMIN — Medication 650 MILLIGRAM(S): at 07:12

## 2019-11-22 RX ADMIN — OXYCODONE HYDROCHLORIDE 5 MILLIGRAM(S): 5 TABLET ORAL at 22:41

## 2019-11-22 RX ADMIN — SODIUM CHLORIDE 3 MILLILITER(S): 9 INJECTION INTRAMUSCULAR; INTRAVENOUS; SUBCUTANEOUS at 18:15

## 2019-11-22 RX ADMIN — HEPARIN SODIUM 7500 UNIT(S): 5000 INJECTION INTRAVENOUS; SUBCUTANEOUS at 21:02

## 2019-11-22 RX ADMIN — HEPARIN SODIUM 7500 UNIT(S): 5000 INJECTION INTRAVENOUS; SUBCUTANEOUS at 13:09

## 2019-11-22 RX ADMIN — OXYCODONE HYDROCHLORIDE 5 MILLIGRAM(S): 5 TABLET ORAL at 01:07

## 2019-11-22 NOTE — PROGRESS NOTE ADULT - ASSESSMENT
60 yo M w PMHx of HTN, HLD, obesity, psoriasis, CAD/RADHA, s/p OPCAB x 3 11/6, admitted for cardiogenci shock in the setting of pericardial effusion and tamponade s/p pericardial window and VAC placement on 11/14 his course cb shock liver and non oliguric GAUTAM associated with refractory hyperkalemia and requirement for RRT.      # Nonoliguric GAUTAM  - likely ATN in the setting of shock associated with sever metabolic acidosis, shock liver and refractory hyperkalemia, s/p one HD session on 11/15 for clearance  - renally stable with BUN/Cr at 90/9.7, no uremic symptoms/signs, will continue to monitor, pending urine Na, Cr  - SBP in 140-160's, please restart home meds Cardizem 120 mg po qd, metoprolol tartrate 75 mg po Q12hr  - off IVF, tolerates oral intake well  - Singh was removed this morning, please get PVR, monitor uop  - Renal US noted, kidneys appear normal, no hydronephrosis  - acidosis likely due to GAUTAM, continue sodium bicarb 650 mg po bid  - cw Phoslo 667 mg po tid w/meals, check PTH, Vit D      Discussed with attending Dr Reyes.

## 2019-11-22 NOTE — PROGRESS NOTE ADULT - ASSESSMENT
This is a 60 y/o male with a PMHx of HTN, HLD, obesity, psorasis, cholecystectomy and known CAD (s/p PTCA/RADHA LAD & D1) presented to his cardiologist with chest pain. He underwent a cardiac cath which revealed 3-vessel CAD. Patient was referred to [facility] for surgical evaluation and deemed a good surgical candidate. On 11/6/2019 the patient underwent an OPCABx3 (EF 45%). His OR course was uncomplicated and the pt arrived to the [facility] extubated. On POD1-5 patient had a small air leak in his pleural CT and underwent a few water seal trials. On POD 6 the airleak was gone and the chest tube was removed and later that day the pt was discharged later that day.     On 11/14/2019, the patient presented to [facility] with complaints of severe anxiety and SOB and in the ED they found K>6, BUN/Cr 36/3.22, AST/ALT >5000, Lactate 13 and bleeding was noted from sternotomy incision. The patient was transferred to the CTICU emergently for evaluation and found to have cardiogenic shock and multiorgan failure. In the CTICU Patient was found to have a circumferential pericardial effusion and the sternal incision was opened at the bedside (drained 700cc of fluid) and a wound vac was placed. Patient required emergent hemodialysis due to persistent acidosis/hyperkalemia and he improved clinically. Throughout the rest of his ICU course, he continued to have GAUTAM throughout his ICU course. He was then transferred to the floor on 11/19/2019 with a cowart and wound vac in place. On 11/20/2019, remained hemodynamically stable. On 11/21/19 a renal US was completed and results are pending. Patient was additionally started on sodium bicab 650 BID and in addition his wound vac was changed,. Urine sodium and urine creatinine was sent and results are pending.     Plan:  Neurovascular: Stable  -PRNs: Oxycodone 5mg    Cardiovascular: S/p OPCABx3 on 11/6/2019   -Monitor: HR, BP, tele   -Sternal wound has wound vac in place (changed today on 11/21/19)   -CAD: ASA, Plavix - Holding B-blocker for permissive HTN. Holding statin secondary to increased LFTs.     Respiratory: Stable   -Sating well on RA   -Encourage use of IS 10x / hr while awake.  -CXR: pending official read, no obvious PTX or acute pathology - f/u with results     GI: Stable.  -PPX: Pantoprazole 40mg   -PO Diet: DASH -encourage oral intake (per nephrology)   -Pt having regular bowel movements - no need for bowel regimen at this time     Renal / :   -GAUTAM: nephrology is following (in the setting of shock and metabolic acidosis with refractory hyperkalemia)- pt has had one session of HD but is not needed again yet.   -Monitor renal function: BUN/Cr 88/10.05 (Cr trended up from yesterday which was 85/9.72)  -Cowart in place: mild hematura in the bag but urine in tubing flowing is now flowing clear and yellow- will monitor for anymore hematuria   -Hyponatremia: continue to trend sodium- Na 132<--129 <-- 127 <--- 124, stop 0.9 NaCl at 75cc/ hr (per nephrology) monitor BMP Q6 hours   -Renal US completed today - f/u with results when available  -Sodium bicarbonate started today 650 mg BID (per nephrology)   -F/u with urine Cr and Na levels once available.     Endocrine:  no hx of DM or thyroid disease   -A1C: 5.4  -TSH: 1.34    Hematologic: Stable   -CBC: H/H 10.4/33.3    ID:  -Temperature: afebrile   -CBC: WBC- 17.03 (trending down)   -Observe for SIRS/Sepsis Syndrome.  -C/w Cefazolin (Empiric Abx for deep sternal wound) - surgical swab (11/14/19) and blood cultures (11/14/19) with no growth to date- PER DR. EUGENE please c/w Cefazolin indefinitely     Prophylaxis:  -DVT prophylaxis with 7500 SubQ Heparin q8h.  -SCD's    Disposition:  -Discharge the patient home when medically ready This is a 58 y/o male with a PMHx of HTN, HLD, obesity, psorasis, cholecystectomy and known CAD (s/p PTCA/RADHA LAD & D1) presented to his cardiologist with chest pain. He underwent a cardiac cath which revealed 3-vessel CAD. Patient was referred to [facility] for surgical evaluation and deemed a good surgical candidate. On 11/6/2019 the patient underwent an OPCABx3 (EF 45%). His OR course was uncomplicated and the pt arrived to the [facility] extubated. On POD1-5 patient had a small air leak in his pleural CT and underwent a few water seal trials. On POD 6 the airleak was gone and the chest tube was removed and later that day the pt was discharged later that day.     On 11/14/2019, the patient presented to [facility] with complaints of severe anxiety and SOB and in the ED they found K>6, BUN/Cr 36/3.22, AST/ALT >5000, Lactate 13 and bleeding was noted from sternotomy incision. The patient was transferred to the CTICU emergently for evaluation and found to have cardiogenic shock and multiorgan failure. In the CTICU Patient was found to have a circumferential pericardial effusion and the sternal incision was opened at the bedside (drained 700cc of fluid) and a wound vac was placed. Patient required emergent hemodialysis due to persistent acidosis/hyperkalemia and he improved clinically. Throughout the rest of his ICU course, he continued to have GAUTAM throughout his ICU course. He was then transferred to the floor on 11/19/2019 with a cowart and wound vac in place. On 11/20/2019, remained hemodynamically stable. On 11/21/19 a renal US was completed and results are pending. Patient was additionally started on sodium bicab 650 BID and in addition his wound vac was changed. On 11/22/19, patient had his Cowart removed and per nephrology, think his BUN/Cr will resolve over time. the patient admitted today that he has a history of high WBCs because of his history of psoriasis.     Plan:  Neurovascular: Stable  -PRNs: Oxycodone 5mg    Cardiovascular: S/p OPCABx3 on 11/6/2019   -Monitor: HR, BP, tele   -Sternal wound has wound vac in place (changed yesertday on 11/21/19- due to be changed Sunday 11/24/19)   -CAD: ASA, Plavix - Holding B-blocker for permissive HTN. Holding statin secondary to increased LFTs.     Respiratory: Stable   -Sating well on RA   -Encourage use of IS 10x / hr while awake.  -CXR: pending official read, no obvious PTX or acute pathology - f/u with results     GI: Stable.  -PPX: Pantoprazole 40mg   -PO Diet: DASH -encourage oral intake (per nephrology)   -Pt having regular bowel movements - no need for bowel regimen at this time   -LFTs have been stable but elevated- monitor daily with CMP     Renal / :   -GAUTAM: nephrology is following (in the setting of shock and metabolic acidosis with refractory hyperkalemia)- pt has had one session of HD but is not needed again yet.  -Monitor renal function: BUN/Cr 90/9.71 (BUN/Cr has been fluctuating up/down but nephrology is hopeful it will resolve)  -Cowart removed today (11/22/19)   -Hyponatremia: continue to trend sodium- Na 133 <-- Na 132<--129 <-- 127  -Monitor BMP Q6 hours   -Renal US completed today - f/u with results when available  -Sodium bicarbonate 650 mg BID (for low CO2)    Endocrine:  no hx of DM or thyroid disease   -A1C: 5.4  -TSH: 1.34    Hematologic: Stable   -CBC: H/H 10.2/    ID:  -Temperature: afebrile   -CBC: WBC- 17.03 (trending down)   -Observe for SIRS/Sepsis Syndrome.  -C/w Cefazolin (Empiric Abx for deep sternal wound) - surgical swab (11/14/19) and blood cultures (11/14/19) with no growth to date- PER DR. EUGENE please c/w Cefazolin indefinitely     Prophylaxis:  -DVT prophylaxis with 7500 SubQ Heparin q8h.  -SCD's    Disposition:  -Discharge the patient home when medically ready This is a 60 y/o male with a PMHx of HTN, HLD, obesity, psorasis, cholecystectomy and known CAD (s/p PTCA/RADHA LAD & D1) presented to his cardiologist with chest pain. He underwent a cardiac cath which revealed 3-vessel CAD. Patient was referred to [facility] for surgical evaluation and deemed a good surgical candidate. On 11/6/2019 the patient underwent an OPCABx3 (EF 45%). His OR course was uncomplicated and the pt arrived to the [facility] extubated. On POD1-5 patient had a small air leak in his pleural CT and underwent a few water seal trials. On POD 6 the airleak was gone and the chest tube was removed and later that day the pt was discharged later that day.     On 11/14/2019, the patient presented to [facility] with complaints of severe anxiety and SOB and in the ED they found K>6, BUN/Cr 36/3.22, AST/ALT >5000, Lactate 13 and bleeding was noted from sternotomy incision. The patient was transferred to the CTICU emergently for evaluation and found to have cardiogenic shock and multiorgan failure. In the CTICU Patient was found to have a circumferential pericardial effusion and the sternal incision was opened at the bedside (drained 700cc of fluid) and a wound vac was placed. Patient required emergent hemodialysis due to persistent acidosis/hyperkalemia and he improved clinically. Throughout the rest of his ICU course, he continued to have GAUTAM throughout his ICU course. He was then transferred to the floor on 11/19/2019 with a cowart and wound vac in place. On 11/20/2019, remained hemodynamically stable. On 11/21/19 a renal US was completed and results are pending. Patient was additionally started on sodium bicab 650 BID and in addition his wound vac was changed. On 11/22/19, patient had his Cowart removed and per nephrology, think his BUN/Cr will resolve over time. the patient admitted today that he has a history of high WBCs because of his history of psoriasis.     Plan:  Neurovascular: Stable  -PRNs: Oxycodone 5mg    Cardiovascular: S/p OPCABx3 on 11/6/2019   -Monitor: HR, BP, tele   -Sternal wound has wound vac in place (changed yesertday on 11/21/19- due to be changed Sunday 11/24/19)   -CAD: ASA, Plavix - Holding B-blocker for permissive HTN. Holding statin secondary to increased LFTs.     Respiratory: Stable   -Sating well on RA   -Encourage use of IS 10x / hr while awake.  -CXR: pending official read, no obvious PTX or acute pathology - f/u with results     GI: Stable.  -PPX: Pantoprazole 40mg   -PO Diet: DASH -encourage oral intake (per nephrology)   -Pt having regular bowel movements - no need for bowel regimen at this time   -LFTs have been stable but elevated- monitor daily with CMP     Renal / :   -GAUTAM: nephrology is following (in the setting of shock and metabolic acidosis with refractory hyperkalemia)- pt has had one session of HD but is not needed again yet.  -Monitor renal function: BUN/Cr 90/9.71 (BUN/Cr has been fluctuating up/down but nephrology is hopeful it will resolve)  -Cowart removed today (11/22/19)   -Hyponatremia: continue to trend sodium- Na 133 <-- Na 132<--129 <-- 127  -Monitor BMP Q6 hours   -Renal US yesterday was normal   -Sodium bicarbonate 650 mg BID (for low CO2)    Endocrine:  no hx of DM or thyroid disease   -A1C: 5.4  -TSH: 1.34    Hematologic: Stable   -CBC: H/H 10.2/32.5    ID:  -Temperature: afebrile   -CBC: WBC- 17.83 (Per the patient, he has a hx of psoriasis and he has a hx of high WBC level)   -Observe for SIRS/Sepsis Syndrome.  -C/w Cefazolin (Empiric Abx for deep sternal wound) - surgical swab (11/14/19) and blood cultures (11/14/19) with no growth to date- PER DR. EUGENE please c/w Cefazolin indefinitely     Prophylaxis:  -DVT prophylaxis with 7500 SubQ Heparin q8h.  -SCD's    Disposition:  -Discharge the patient home when medically ready This is a 58 y/o male with a PMHx of HTN, HLD, obesity, psorasis, cholecystectomy and known CAD (s/p PTCA/RADHA LAD & D1) presented to his cardiologist with chest pain. He underwent a cardiac cath which revealed 3-vessel CAD. Patient was referred to [facility] for surgical evaluation and deemed a good surgical candidate. On 11/6/2019 the patient underwent an OPCABx3 (EF 45%). His OR course was uncomplicated and the pt arrived to the [facility] extubated. On POD1-5 patient had a small air leak in his pleural CT and underwent a few water seal trials. On POD 6 the airleak was gone and the chest tube was removed and later that day the pt was discharged later that day.     On 11/14/2019, the patient presented to [facility] with complaints of severe anxiety and SOB and in the ED they found K>6, BUN/Cr 36/3.22, AST/ALT >5000, Lactate 13 and bleeding was noted from sternotomy incision. The patient was transferred to the CTICU emergently for evaluation and found to have cardiogenic shock and multiorgan failure. In the CTICU Patient was found to have a circumferential pericardial effusion and the sternal incision was opened at the bedside (drained 700cc of fluid) and a wound vac was placed. Patient required emergent hemodialysis due to persistent acidosis/hyperkalemia and he improved clinically. Throughout the rest of his ICU course, he continued to have GAUTAM throughout his ICU course. He was then transferred to the floor on 11/19/2019 with a cowart and wound vac in place. On 11/20/2019, remained hemodynamically stable. On 11/21/19 a renal US was completed and normal. Patient was additionally started on sodium bicab 650 BID and in addition his wound vac was changed. On 11/22/19, patient had his Cowart removed and per nephrology, think his BUN/Cr will resolve over time. The patient also admitted today that he has a history of high WBCs because of his history of psoriasis.     Plan:  Neurovascular: Stable  -PRNs: Oxycodone 5mg    Cardiovascular: S/p OPCABx3 on 11/6/2019   -Monitor: HR, BP, tele   -Sternal wound has wound vac in place (changed yesertday on 11/21/19- due to be changed Sunday 11/24/19)   -CAD: ASA, Plavix - Holding B-blocker for permissive HTN. Holding statin secondary to increased LFTs.     Respiratory: Stable   -Sating well on RA   -Encourage use of IS 10x / hr while awake.  -CXR: pending official read, no obvious PTX or acute pathology - f/u with results     GI: Stable.  -PPX: Pantoprazole 40mg   -PO Diet: DASH -encourage oral intake (per nephrology)   -Pt having regular bowel movements - no need for bowel regimen at this time   -LFTs have been stable but elevated- monitor daily with CMP     Renal / :   -GAUTAM: nephrology is following (in the setting of shock and metabolic acidosis with refractory hyperkalemia)- pt has had one session of HD but is not needed again yet.  -Monitor renal function: BUN/Cr 90/9.71 (BUN/Cr has been fluctuating up/down but nephrology is hopeful it will resolve)  -Cowart removed today (11/22/19)   -Hyponatremia: continue to trend sodium- Na 133 <-- Na 132<--129 <-- 127  -Monitor BMP Q6 hours   -Renal US yesterday was normal   -Sodium bicarbonate 650 mg BID (for low CO2)    Endocrine:  no hx of DM or thyroid disease   -A1C: 5.4  -TSH: 1.34    Hematologic: Stable   -CBC: H/H 10.2/32.5    ID:  -Temperature: afebrile   -CBC: WBC- 17.83 (Per the patient, he has a hx of psoriasis and he has a hx of high WBC level)   -Observe for SIRS/Sepsis Syndrome.  -C/w Cefazolin (Empiric Abx for deep sternal wound) - surgical swab (11/14/19) and blood cultures (11/14/19) with no growth to date- PER DR. EUGENE please c/w Cefazolin indefinitely     Prophylaxis:  -DVT prophylaxis with 7500 SubQ Heparin q8h.  -SCD's    Disposition:  -Discharge the patient home when medically ready

## 2019-11-22 NOTE — PROGRESS NOTE ADULT - SUBJECTIVE AND OBJECTIVE BOX
Patient discussed on morning rounds with Dr. Hammond      Operation / Date: 11/6/2019 - OPCABx3 (EF 45%)     SUBJECTIVE ASSESSMENT:  Pt is feeling very well this morning .    Vital Signs Last 24 Hrs  T(C): 36.9 (22 Nov 2019 09:00), Max: 37 (21 Nov 2019 14:01)  T(F): 98.4 (22 Nov 2019 09:00), Max: 98.6 (21 Nov 2019 14:01)  HR: 100 (22 Nov 2019 08:52) (84 - 100)  BP: 166/93 (22 Nov 2019 08:52) (144/95 - 166/93)  BP(mean): 109 (22 Nov 2019 08:52) (109 - 134)  RR: 20 (22 Nov 2019 08:52) (18 - 20)  SpO2: 97% (22 Nov 2019 08:52) (94% - 98%)  I&O's Detail    21 Nov 2019 07:01  -  22 Nov 2019 07:00  --------------------------------------------------------  IN:    Oral Fluid: 690 mL    sodium chloride 0.9%: 845 mL  Total IN: 1535 mL    OUT:    Ureteral Catheter: 2575 mL    VAC (Vacuum Assisted Closure) System: 50 mL  Total OUT: 2625 mL    Total NET: -1090 mL      22 Nov 2019 07:01  -  22 Nov 2019 11:39  --------------------------------------------------------  IN:    Oral Fluid: 417 mL  Total IN: 417 mL    OUT:    Ureteral Catheter: 265 mL    Voided: 50 mL  Total OUT: 315 mL    Total NET: 102 mL          CHEST TUBE:  Yes/No. AIR LEAKS: Yes/No. Suction / H2O SEAL.   YI DRAIN:  Yes/No.  EPICARDIAL WIRES: Yes/No.  TIE DOWNS: Yes/No.  IVORY: Yes/No.    PHYSICAL EXAM:    General:     Neurological:    Cardiovascular:    Respiratory:    Gastrointestinal:    Extremities:    Vascular:    Incision Sites:    LABS:                        10.2   17.83 )-----------( 195      ( 22 Nov 2019 06:33 )             32.5       COUMADIN:  Yes/No. REASON: .        11-22    133<L>  |  94<L>  |  90<H>  ----------------------------<  97  3.8   |  20<L>  |  9.71<H>    Ca    8.4      22 Nov 2019 06:33  Mg     2.4     11-22    TPro  6.7  /  Alb  3.4  /  TBili  1.4<H>  /  DBili  x   /  AST  32  /  ALT  <5<L>  /  AlkPhos  177<H>  11-22          MEDICATIONS  (STANDING):  aspirin enteric coated 81 milliGRAM(s) Oral daily  ceFAZolin   IVPB 1000 milliGRAM(s) IV Intermittent every 24 hours  clopidogrel Tablet 75 milliGRAM(s) Oral daily  heparin  Injectable 7500 Unit(s) SubCutaneous every 8 hours  pantoprazole    Tablet 40 milliGRAM(s) Oral before breakfast  sodium bicarbonate 650 milliGRAM(s) Oral two times a day  sodium chloride 0.9% lock flush 3 milliLiter(s) IV Push every 8 hours    MEDICATIONS  (PRN):  oxyCODONE    IR 5 milliGRAM(s) Oral every 6 hours PRN Severe Pain (7 - 10)        RADIOLOGY & ADDITIONAL TESTS: Patient discussed on morning rounds with Dr. Hammond      Operation / Date: 11/6/2019 - OPCABx3 (EF 45%)     SUBJECTIVE ASSESSMENT:  Pt is feeling very well this morning. He has been ambulating without difficulty and using his IS regularly. States that he has a known high white cell count because of his psoriasis history. Moving his bowel regularly. Denies CP, palpitations, SOB, abdominal pain, N/V/D, fevers or chills at this time.     Vital Signs Last 24 Hrs  T(C): 36.9 (22 Nov 2019 09:00), Max: 37 (21 Nov 2019 14:01)  T(F): 98.4 (22 Nov 2019 09:00), Max: 98.6 (21 Nov 2019 14:01)  HR: 100 (22 Nov 2019 08:52) (84 - 100)  BP: 166/93 (22 Nov 2019 08:52) (144/95 - 166/93)  BP(mean): 109 (22 Nov 2019 08:52) (109 - 134)  RR: 20 (22 Nov 2019 08:52) (18 - 20)  SpO2: 97% (22 Nov 2019 08:52) (94% - 98%)  I&O's Detail    21 Nov 2019 07:01  -  22 Nov 2019 07:00  --------------------------------------------------------  IN:    Oral Fluid: 690 mL    sodium chloride 0.9%: 845 mL  Total IN: 1535 mL    OUT:    Ureteral Catheter: 2575 mL    VAC (Vacuum Assisted Closure) System: 50 mL  Total OUT: 2625 mL    Total NET: -1090 mL      22 Nov 2019 07:01  -  22 Nov 2019 11:39  --------------------------------------------------------  IN:    Oral Fluid: 417 mL  Total IN: 417 mL    OUT:    Ureteral Catheter: 265 mL    Voided: 50 mL  Total OUT: 315 mL    Total NET: 102 mL    CHEST TUBE:  No.  YI DRAIN:  No.  EPICARDIAL WIRES: No.  TIE DOWNS: No.  IVORY: No. (removed today)     PHYSICAL EXAM:  General: Well-appearing sitting up in a chair at the bedside.   Neurological: AOx3. Motor skills are grossly in-tact.   Cardiovascular: Normal S1/S2. RRR. No M/R/G   Respiratory: Lungs are CTA b/l. No W/R/R.  Gastrointestinal: +BS in 4 quadrants. Soft. Non-tender. Non-distended.   Extremities: Strength 5/5 b/l upper/lower extremities.   Vascular: Radial 2+ b/l. DP 2+ b/l.   Incision Sites: Sternotomy incision with wound vac in place without erythema, purulence, or ecchymosis (changed yesterday 11/21/19). Left radial incisions (2) without erythema, purulence, or ecchymosis.     LABS:                        10.2   17.83 )-----------( 195      ( 22 Nov 2019 06:33 )             32.5       COUMADIN:  No        11-22    133<L>  |  94<L>  |  90<H>  ----------------------------<  97  3.8   |  20<L>  |  9.71<H>    Ca    8.4      22 Nov 2019 06:33  Mg     2.4     11-22    TPro  6.7  /  Alb  3.4  /  TBili  1.4<H>  /  DBili  x   /  AST  32  /  ALT  <5<L>  /  AlkPhos  177<H>  11-22          MEDICATIONS  (STANDING):  aspirin enteric coated 81 milliGRAM(s) Oral daily  ceFAZolin   IVPB 1000 milliGRAM(s) IV Intermittent every 24 hours  clopidogrel Tablet 75 milliGRAM(s) Oral daily  heparin  Injectable 7500 Unit(s) SubCutaneous every 8 hours  pantoprazole    Tablet 40 milliGRAM(s) Oral before breakfast  sodium bicarbonate 650 milliGRAM(s) Oral two times a day  sodium chloride 0.9% lock flush 3 milliLiter(s) IV Push every 8 hours    MEDICATIONS  (PRN):  oxyCODONE    IR 5 milliGRAM(s) Oral every 6 hours PRN Severe Pain (7 - 10)        RADIOLOGY & ADDITIONAL TESTS:  < from: Xray Chest 1 View- PORTABLE-Routine (11.21.19 @ 05:12) >  Impression: Small Left effusion. Discoid changes left lung base    < end of copied text >

## 2019-11-22 NOTE — PROGRESS NOTE ADULT - ATTENDING COMMENTS
nonoliguric ATN- creatinine plateaued-  electrolytes acceptable; NO N, V  appetite good  worsening LE edema- dc IVF- can consider diuretics over next day or so if edema worsens  also prefer dc cowart at this time.  No dialysis planned unless creatinine begins to rise again or develops uremic symptoms

## 2019-11-22 NOTE — PROGRESS NOTE ADULT - SUBJECTIVE AND OBJECTIVE BOX
no acute events overnight  no active complains at present  renally stable with BUN/Cr at 90/9.7  SBP in 140-160's  off IVF, tolerates oral intake well  Singh was removed this morning  Renal US noted, kidneys appear normal, no hydronephrosis            Meds:    aspirin enteric coated 81 milliGRAM(s) Oral daily  ceFAZolin   IVPB 1000 milliGRAM(s) IV Intermittent every 24 hours  clopidogrel Tablet 75 milliGRAM(s) Oral daily  heparin  Injectable 7500 Unit(s) SubCutaneous every 8 hours  oxyCODONE    IR 5 milliGRAM(s) Oral every 6 hours PRN  pantoprazole    Tablet 40 milliGRAM(s) Oral before breakfast  sodium bicarbonate 650 milliGRAM(s) Oral two times a day  sodium chloride 0.9% lock flush 3 milliLiter(s) IV Push every 8 hours      T(C): 36.9 (11-22-19 @ 09:00), Max: 37 (11-21-19 @ 14:01)  HR: 100 (11-22-19 @ 08:52) (84 - 100)  BP: 166/93 (11-22-19 @ 08:52) (144/95 - 166/93)  RR: 20 (11-22-19 @ 08:52) (18 - 20)  SpO2: 97% (11-22-19 @ 08:52) (94% - 98%)    Input/Output      11-21-19 @ 07:01  -  11-22-19 @ 07:00  --------------------------------------------------------  IN: 1535 mL / OUT: 2625 mL / NET: -1090 mL    11-22-19 @ 07:01  -  11-22-19 @ 11:10  --------------------------------------------------------  IN: 417 mL / OUT: 315 mL / NET: 102 mL        PHYSICAL EXAM  GENERAL: NAD, alert  CHEST/LUNG: equal breath sounds bilateral, diminished at bases   HEART: normal S1S2, RRR, no pericardiac rub  ABDOMEN: Soft, NT/ND  EXTREMITIES: Warm, No peripheral edema  Neurology: awake and alert, nonfocal, no asterixis        LABS:                                               10.2   17.83 )-----------( 195      ( 22 Nov 2019 06:33 )             32.5       11-22    133<L>  |  94<L>  |  90<H>  ----------------------------<  97  3.8   |  20<L>  |  9.71<H>    Ca    8.4      22 Nov 2019 06:33  Mg     2.4     11-22    TPro  6.7  /  Alb  3.4  /  TBili  1.4<H>  /  DBili  x   /  AST  32  /  ALT  <5<L>  /  AlkPhos  177<H>  11-22                RADIOLOGY & ADDITIONAL STUDIES:    < from: US Retroperitoneal Complete (11.21.19 @ 13:14) >    EXAM:  US RETROPERITONEAL COMPLETE                          PROCEDURE DATE:  11/21/2019          INTERPRETATION:  RENAL and BLADDER ULTRASOUND dated 11/21/2019 1:14 PM    Indication: GAUTAM (likely ATN). Hyponatremia. Increasing creatinine (last   was10.05). HD x1.    Prior studies: None    Findings:    RIGHT KIDNEY:  Length: 12.0 cm  Lesions: None  Hydronephrosis: None  Stones: None  Echogenicity: Normal    LEFT KIDNEY:  Length: 12.9 cm  Lesions: None  Hydronephrosis: None  Stones: None  Echogenicity: Normal    URINARY BLADDER:  A Singh catheter is present within decompressed bladder.    IMPRESSION:  1. The kidneys appear normal. No hydronephrosis.    2. Singh catheter is present within a decompressed bladder.        < end of copied text >      < from: Xray Chest 1 View- PORTABLE-Routine (11.21.19 @ 05:12) >    EXAM:  XR CHEST PORTABLE ROUTINE 1V                          PROCEDURE DATE:  11/21/2019          INTERPRETATION:  Clinical History: Effusion    Portable examination of the chest demonstrates be status post sternotomy.   Mild cardiomegaly. Small Left effusion. Discoid changes left lung base.    Impression: Small Left effusion. Discoid changes left lung base    < end of copied text >

## 2019-11-23 DIAGNOSIS — I10 ESSENTIAL (PRIMARY) HYPERTENSION: ICD-10-CM

## 2019-11-23 LAB
ALBUMIN SERPL ELPH-MCNC: 3.3 G/DL — SIGNIFICANT CHANGE UP (ref 3.3–5)
ALP SERPL-CCNC: 170 U/L — HIGH (ref 40–120)
ALT FLD-CCNC: 5 U/L — LOW (ref 10–45)
ANION GAP SERPL CALC-SCNC: 17 MMOL/L — SIGNIFICANT CHANGE UP (ref 5–17)
AST SERPL-CCNC: 32 U/L — SIGNIFICANT CHANGE UP (ref 10–40)
BILIRUB SERPL-MCNC: 1.4 MG/DL — HIGH (ref 0.2–1.2)
BUN SERPL-MCNC: 84 MG/DL — HIGH (ref 7–23)
CALCIUM SERPL-MCNC: 8.6 MG/DL — SIGNIFICANT CHANGE UP (ref 8.4–10.5)
CALCIUM SERPL-MCNC: 8.8 MG/DL — SIGNIFICANT CHANGE UP (ref 8.4–10.5)
CHLORIDE SERPL-SCNC: 98 MMOL/L — SIGNIFICANT CHANGE UP (ref 96–108)
CO2 SERPL-SCNC: 20 MMOL/L — LOW (ref 22–31)
CREAT SERPL-MCNC: 8.78 MG/DL — HIGH (ref 0.5–1.3)
GLUCOSE SERPL-MCNC: 98 MG/DL — SIGNIFICANT CHANGE UP (ref 70–99)
HCT VFR BLD CALC: 29.8 % — LOW (ref 39–50)
HGB BLD-MCNC: 9.6 G/DL — LOW (ref 13–17)
MAGNESIUM SERPL-MCNC: 2.2 MG/DL — SIGNIFICANT CHANGE UP (ref 1.6–2.6)
MCHC RBC-ENTMCNC: 29.7 PG — SIGNIFICANT CHANGE UP (ref 27–34)
MCHC RBC-ENTMCNC: 32.2 GM/DL — SIGNIFICANT CHANGE UP (ref 32–36)
MCV RBC AUTO: 92.3 FL — SIGNIFICANT CHANGE UP (ref 80–100)
NRBC # BLD: 0 /100 WBCS — SIGNIFICANT CHANGE UP (ref 0–0)
PLATELET # BLD AUTO: 201 K/UL — SIGNIFICANT CHANGE UP (ref 150–400)
POTASSIUM SERPL-MCNC: 3.9 MMOL/L — SIGNIFICANT CHANGE UP (ref 3.5–5.3)
POTASSIUM SERPL-SCNC: 3.9 MMOL/L — SIGNIFICANT CHANGE UP (ref 3.5–5.3)
PROT SERPL-MCNC: 6.4 G/DL — SIGNIFICANT CHANGE UP (ref 6–8.3)
PTH-INTACT FLD-MCNC: 261 PG/ML — HIGH (ref 15–65)
RBC # BLD: 3.23 M/UL — LOW (ref 4.2–5.8)
RBC # FLD: 14.6 % — HIGH (ref 10.3–14.5)
SODIUM SERPL-SCNC: 135 MMOL/L — SIGNIFICANT CHANGE UP (ref 135–145)
VIT D25+D1,25 OH+D1,25 PNL SERPL-MCNC: <5 PG/ML — LOW (ref 19.9–79.3)
WBC # BLD: 15.7 K/UL — HIGH (ref 3.8–10.5)
WBC # FLD AUTO: 15.7 K/UL — HIGH (ref 3.8–10.5)

## 2019-11-23 PROCEDURE — 71045 X-RAY EXAM CHEST 1 VIEW: CPT | Mod: 26

## 2019-11-23 RX ORDER — ATORVASTATIN CALCIUM 80 MG/1
10 TABLET, FILM COATED ORAL AT BEDTIME
Refills: 0 | Status: DISCONTINUED | OUTPATIENT
Start: 2019-11-23 | End: 2019-11-25

## 2019-11-23 RX ADMIN — Medication 650 MILLIGRAM(S): at 17:20

## 2019-11-23 RX ADMIN — HEPARIN SODIUM 7500 UNIT(S): 5000 INJECTION INTRAVENOUS; SUBCUTANEOUS at 14:37

## 2019-11-23 RX ADMIN — Medication 12.5 MILLIGRAM(S): at 17:20

## 2019-11-23 RX ADMIN — SODIUM CHLORIDE 3 MILLILITER(S): 9 INJECTION INTRAMUSCULAR; INTRAVENOUS; SUBCUTANEOUS at 21:44

## 2019-11-23 RX ADMIN — Medication 650 MILLIGRAM(S): at 06:11

## 2019-11-23 RX ADMIN — SODIUM CHLORIDE 3 MILLILITER(S): 9 INJECTION INTRAMUSCULAR; INTRAVENOUS; SUBCUTANEOUS at 06:17

## 2019-11-23 RX ADMIN — ATORVASTATIN CALCIUM 10 MILLIGRAM(S): 80 TABLET, FILM COATED ORAL at 21:46

## 2019-11-23 RX ADMIN — CLOPIDOGREL BISULFATE 75 MILLIGRAM(S): 75 TABLET, FILM COATED ORAL at 11:21

## 2019-11-23 RX ADMIN — HEPARIN SODIUM 7500 UNIT(S): 5000 INJECTION INTRAVENOUS; SUBCUTANEOUS at 06:10

## 2019-11-23 RX ADMIN — Medication 81 MILLIGRAM(S): at 11:21

## 2019-11-23 RX ADMIN — SODIUM CHLORIDE 3 MILLILITER(S): 9 INJECTION INTRAMUSCULAR; INTRAVENOUS; SUBCUTANEOUS at 14:19

## 2019-11-23 RX ADMIN — Medication 12.5 MILLIGRAM(S): at 06:11

## 2019-11-23 RX ADMIN — OXYCODONE HYDROCHLORIDE 5 MILLIGRAM(S): 5 TABLET ORAL at 21:46

## 2019-11-23 RX ADMIN — Medication 100 MILLIGRAM(S): at 11:21

## 2019-11-23 NOTE — PROGRESS NOTE ADULT - ASSESSMENT
This is a 60 y/o male with a PMHx of HTN, HLD, obesity, psorasis, cholecystectomy and known CAD (s/p PTCA/RADHA LAD & D1) s/p OPCABx3 (EF 45%) on 11/6/19 and discharged home POD 6 after an uneventful post op course.  On 11/14/2019, the patient presented to [facility] with complaints of severe anxiety and SOB and in the ED they found K>6, BUN/Cr 36/3.22, AST/ALT >5000, Lactate 13 and bleeding was noted from sternotomy incision. The patient was transferred to the CTICU emergently for evaluation and found to have cardiogenic shock with multiorgan failure 2/2 pericardial effusion and tamponade. In the CTICU the sternal incision was opened at the bedside (drained 700cc of fluid) and a wound vac was placed. Patient required emergent hemodialysis due to persistent acidosis/hyperkalemia.  Remainder of his ICU course significant for non oliguric GAUTAM 2/2 ATN with peak Cr of 10, however did not require RRT again.  He was transferred to the stepdown floor on 11/19/2019.  He remained stable on the stepdown floor and passed TOV on 11/22/19.      Plan:  Neurovascular: Stable  -PRNs: Oxycodone 5mg    Cardiovascular: S/p OPCABx3 on 11/6/2019   -Monitor: HR, BP, tele   -Sternal wound has wound vac in place, to be changed today.    -CAD: ASA, Plavix, tolerating lopressor 12.5 mg BID  -start low dose statin today now that transaminitis has resolved.     Respiratory:   -Saturating well on RA   -Encourage IS and ambulation    GI: Transaminitis on admission 2/2 cardiogenic shock/tamponade, now resolved.  -con't ppx with protonix   -PO diet    Renal / :   -non oliguric GAUTAM 2/2 ATN, required 1 session of HD on admission due to refractory hyperkalemia, now improving   -Hyponatremia, now resolved.   -nephrology following, continue to appreciate recs.  -strict I/O's and closely monitor UO   -con't to trend BUN/Cr daily  -con't sodium bicarbonate 650 mg BID    Endocrine:  no hx of DM or thyroid disease   -A1C: 5.4  -TSH: 1.34    Hematologic: Stable   -HSQ for DVT ppx  -DAP for recent OPCAB    ID:  -remains afebrile  -con't Ancef for deep sternal wound ppx.  Per Dr. Naik should be antibiotics indefinitely to prevent deep sternal wound infection.     Disposition:  -Discharge home when medically ready

## 2019-11-23 NOTE — PROGRESS NOTE ADULT - PROBLEM SELECTOR PLAN 1
non-oliguric GAUTAM most probably secondary to ATN  UO ~2500cc in past 24 hours  Scret starting to trend downwards  continue to monitor - no indications for hemodialysis at this time

## 2019-11-23 NOTE — PROGRESS NOTE ADULT - ASSESSMENT
60 yo M w PMHx of HTN, HLD, obesity, psoriasis, CAD/RADHA, s/p OPCAB x 3 11/6, admitted for cardiogenci shock in the setting of pericardial effusion and tamponade s/p pericardial window and VAC placement on 11/14 his course cb shock liver and non oliguric AGUTAM associated with refractory hyperkalemia and requirement for RRT.

## 2019-11-23 NOTE — PROGRESS NOTE ADULT - SUBJECTIVE AND OBJECTIVE BOX
Patient discussed on morning rounds with Dr. Madden/ Dr. Baum     Operation / Date: 11/6/2019 - OPCABx3 (EF 45%) readmitted 11/14 for pericardial effusion/tamponade    SUBJECTIVE ASSESSMENT:  Patient is feeling much better this morning, he is hopeful that he will be able to go home on Monday.  He is ambulating independently and voiding without difficulties.  Denies HA, dizziness, CP, SOB, palpitations, N/V/D/C, leg swelling or calf pain.    Vital Signs Last 24 Hrs  T(C): 36.7 (23 Nov 2019 09:34), Max: 36.9 (22 Nov 2019 21:58)  T(F): 98 (23 Nov 2019 09:34), Max: 98.5 (23 Nov 2019 06:19)  HR: 96 (23 Nov 2019 13:21) (86 - 102)  BP: 152/81 (23 Nov 2019 13:21) (152/80 - 169/87)  BP(mean): 124 (23 Nov 2019 13:21) (113 - 125)  RR: 20 (23 Nov 2019 13:21) (18 - 20)  SpO2: 97% (23 Nov 2019 13:21) (95% - 97%)  I&O's Detail    22 Nov 2019 07:01  -  23 Nov 2019 07:00  --------------------------------------------------------  IN:    Oral Fluid: 957 mL  Total IN: 957 mL    OUT:    Ureteral Catheter: 265 mL    VAC (Vacuum Assisted Closure) System: 50 mL    Voided: 2500 mL  Total OUT: 2815 mL    Total NET: -1858 mL      23 Nov 2019 07:01  -  23 Nov 2019 14:14  --------------------------------------------------------  IN:    IV PiggyBack: 50 mL  Total IN: 50 mL    OUT:    Voided: 550 mL  Total OUT: 550 mL    Total NET: -500 mL      CHEST TUBE:  No.   YI DRAIN:  No.  EPICARDIAL WIRES: No.  TIE DOWNS: NoMaya IVORY: No.    PHYSICAL EXAM:    General:     Neurological:    Cardiovascular:    Respiratory:    Gastrointestinal:    Extremities:    Vascular:    Incision Sites:    LABS:                        9.6    15.70 )-----------( 201      ( 23 Nov 2019 05:47 )             29.8         11-23    135  |  98  |  84<H>  ----------------------------<  98  3.9   |  20<L>  |  8.78<H>    Ca    8.8      23 Nov 2019 05:47  Mg     2.2     11-23    TPro  6.4  /  Alb  3.3  /  TBili  1.4<H>  /  DBili  x   /  AST  32  /  ALT  5<L>  /  AlkPhos  170<H>  11-23      MEDICATIONS  (STANDING):  aspirin enteric coated 81 milliGRAM(s) Oral daily  ceFAZolin   IVPB 1000 milliGRAM(s) IV Intermittent every 24 hours  clopidogrel Tablet 75 milliGRAM(s) Oral daily  heparin  Injectable 7500 Unit(s) SubCutaneous every 8 hours  metoprolol tartrate 12.5 milliGRAM(s) Oral every 12 hours  pantoprazole    Tablet 40 milliGRAM(s) Oral before breakfast  sodium bicarbonate 650 milliGRAM(s) Oral two times a day  sodium chloride 0.9% lock flush 3 milliLiter(s) IV Push every 8 hours    MEDICATIONS  (PRN):  oxyCODONE    IR 5 milliGRAM(s) Oral every 6 hours PRN Severe Pain (7 - 10)        RADIOLOGY & ADDITIONAL TESTS:

## 2019-11-23 NOTE — PROGRESS NOTE ADULT - SUBJECTIVE AND OBJECTIVE BOX
CC: 125.10HYPERTENSION  RESPIRATORY DISTRESSHYPERTENSION      INTERVAL HISTORY:  sitting in chair  no complaints; denies nausea and/or vomiting  no uremic signs      ROS: No chest pain, no sob, no abd pain. No n/v/d    PAST MEDICAL & SURGICAL HISTORY:  Presence of stent in LAD coronary artery  CAD in native artery  Psoriasis  Obesity  HLD (hyperlipidemia)  HTN (hypertension)  S/P CABG (coronary artery bypass graft)  H/O arthroscopy of knee  S/P cholecystectomy      PHYSICAL EXAM:  T(C): 36.9 (11-23-19 @ 06:19), Max: 36.9 (11-22-19 @ 09:00)  HR: 94 (11-23-19 @ 05:00)  BP: 158/86 (11-23-19 @ 05:00) (152/80 - 169/87)  RR: 20 (11-23-19 @ 05:00)  SpO2: 97% (11-23-19 @ 05:00)  Wt(kg): --  I&O's Summary    22 Nov 2019 07:01  -  23 Nov 2019 07:00  --------------------------------------------------------  IN: 837 mL / OUT: 2815 mL / NET: -1978 mL      Weight   General: AAO x 3,  NAD.  HEENT: moist mucous membranes, no pallor/cyanosis.  Neck: no JVD visible.  Cardiac: S1, S2. RRR. No murmurs   Respratory: CTA b/l, no access muscle use.   Abdomen: soft. nontender. nondistended  Skin: no rashes.  Extremities: + LE edema b/l  Access:       DATA:                        9.6<L>  15.70<H> )-----------( 201      ( 23 Nov 2019 05:47 )             29.8<L>        135    |  98     |  84<H>  ----------------------------<  98     Ca:8.8   (23 Nov 2019 05:47)  3.9     |  20<L>  |  8.78<H>      eGFR if Non : 6 <L>  eGFR if : 7 <L>    TPro  6.4    /  Alb  3.3    /  TBili  1.4<H>  /  DBili  x      /  AST  32     /  ALT  5<L>   /  AlkPhos  170<H>  23 Nov 2019 05:47          Creatinine, Random Urine: 67 mg/dL (11-22 @ 11:24)  Sodium, Random Urine: 44 mmol/L (11-22 @ 11:24)            MEDICATIONS  (STANDING):  aspirin enteric coated 81 milliGRAM(s) Oral daily  ceFAZolin   IVPB 1000 milliGRAM(s) IV Intermittent every 24 hours  clopidogrel Tablet 75 milliGRAM(s) Oral daily  heparin  Injectable 7500 Unit(s) SubCutaneous every 8 hours  metoprolol tartrate 12.5 milliGRAM(s) Oral every 12 hours  pantoprazole    Tablet 40 milliGRAM(s) Oral before breakfast  sodium bicarbonate 650 milliGRAM(s) Oral two times a day  sodium chloride 0.9% lock flush 3 milliLiter(s) IV Push every 8 hours    MEDICATIONS  (PRN):  oxyCODONE    IR 5 milliGRAM(s) Oral every 6 hours PRN Severe Pain (7 - 10)

## 2019-11-24 LAB
ALBUMIN SERPL ELPH-MCNC: 3.1 G/DL — LOW (ref 3.3–5)
ALP SERPL-CCNC: 140 U/L — HIGH (ref 40–120)
ALT FLD-CCNC: 6 U/L — LOW (ref 10–45)
ANION GAP SERPL CALC-SCNC: 16 MMOL/L — SIGNIFICANT CHANGE UP (ref 5–17)
APTT BLD: 36.5 SEC — HIGH (ref 27.5–36.3)
AST SERPL-CCNC: 33 U/L — SIGNIFICANT CHANGE UP (ref 10–40)
BILIRUB DIRECT SERPL-MCNC: 0.6 MG/DL — HIGH (ref 0–0.2)
BILIRUB INDIRECT FLD-MCNC: 0.5 MG/DL — SIGNIFICANT CHANGE UP (ref 0.2–1)
BILIRUB SERPL-MCNC: 1.1 MG/DL — SIGNIFICANT CHANGE UP (ref 0.2–1.2)
BUN SERPL-MCNC: 81 MG/DL — HIGH (ref 7–23)
CALCIUM SERPL-MCNC: 8.2 MG/DL — LOW (ref 8.4–10.5)
CHLORIDE SERPL-SCNC: 99 MMOL/L — SIGNIFICANT CHANGE UP (ref 96–108)
CO2 SERPL-SCNC: 20 MMOL/L — LOW (ref 22–31)
CREAT SERPL-MCNC: 7.67 MG/DL — HIGH (ref 0.5–1.3)
GLUCOSE SERPL-MCNC: 96 MG/DL — SIGNIFICANT CHANGE UP (ref 70–99)
HCT VFR BLD CALC: 28.5 % — LOW (ref 39–50)
HGB BLD-MCNC: 9.2 G/DL — LOW (ref 13–17)
INR BLD: 1.12 — SIGNIFICANT CHANGE UP (ref 0.88–1.16)
MAGNESIUM SERPL-MCNC: 2 MG/DL — SIGNIFICANT CHANGE UP (ref 1.6–2.6)
MCHC RBC-ENTMCNC: 29.9 PG — SIGNIFICANT CHANGE UP (ref 27–34)
MCHC RBC-ENTMCNC: 32.3 GM/DL — SIGNIFICANT CHANGE UP (ref 32–36)
MCV RBC AUTO: 92.5 FL — SIGNIFICANT CHANGE UP (ref 80–100)
NRBC # BLD: 0 /100 WBCS — SIGNIFICANT CHANGE UP (ref 0–0)
PHOSPHATE SERPL-MCNC: 6.7 MG/DL — HIGH (ref 2.5–4.5)
PLATELET # BLD AUTO: 246 K/UL — SIGNIFICANT CHANGE UP (ref 150–400)
POTASSIUM SERPL-MCNC: 4 MMOL/L — SIGNIFICANT CHANGE UP (ref 3.5–5.3)
POTASSIUM SERPL-SCNC: 4 MMOL/L — SIGNIFICANT CHANGE UP (ref 3.5–5.3)
PROT SERPL-MCNC: 6.5 G/DL — SIGNIFICANT CHANGE UP (ref 6–8.3)
PROTHROM AB SERPL-ACNC: 12.7 SEC — SIGNIFICANT CHANGE UP (ref 10–12.9)
RBC # BLD: 3.08 M/UL — LOW (ref 4.2–5.8)
RBC # FLD: 14.7 % — HIGH (ref 10.3–14.5)
SODIUM SERPL-SCNC: 135 MMOL/L — SIGNIFICANT CHANGE UP (ref 135–145)
WBC # BLD: 14.23 K/UL — HIGH (ref 3.8–10.5)
WBC # FLD AUTO: 14.23 K/UL — HIGH (ref 3.8–10.5)

## 2019-11-24 PROCEDURE — 97605 NEG PRS WND THER DME<=50SQCM: CPT

## 2019-11-24 PROCEDURE — 71045 X-RAY EXAM CHEST 1 VIEW: CPT | Mod: 26

## 2019-11-24 RX ORDER — CALCIUM ACETATE 667 MG
667 TABLET ORAL
Refills: 0 | Status: DISCONTINUED | OUTPATIENT
Start: 2019-11-24 | End: 2019-11-25

## 2019-11-24 RX ORDER — SODIUM BICARBONATE 1 MEQ/ML
650 SYRINGE (ML) INTRAVENOUS THREE TIMES A DAY
Refills: 0 | Status: DISCONTINUED | OUTPATIENT
Start: 2019-11-24 | End: 2019-11-25

## 2019-11-24 RX ORDER — FENTANYL CITRATE 50 UG/ML
12.5 INJECTION INTRAVENOUS ONCE
Refills: 0 | Status: DISCONTINUED | OUTPATIENT
Start: 2019-11-24 | End: 2019-11-24

## 2019-11-24 RX ADMIN — Medication 12.5 MILLIGRAM(S): at 17:23

## 2019-11-24 RX ADMIN — CLOPIDOGREL BISULFATE 75 MILLIGRAM(S): 75 TABLET, FILM COATED ORAL at 11:23

## 2019-11-24 RX ADMIN — Medication 650 MILLIGRAM(S): at 13:35

## 2019-11-24 RX ADMIN — ATORVASTATIN CALCIUM 10 MILLIGRAM(S): 80 TABLET, FILM COATED ORAL at 21:26

## 2019-11-24 RX ADMIN — SODIUM CHLORIDE 3 MILLILITER(S): 9 INJECTION INTRAMUSCULAR; INTRAVENOUS; SUBCUTANEOUS at 17:23

## 2019-11-24 RX ADMIN — Medication 100 MILLIGRAM(S): at 11:24

## 2019-11-24 RX ADMIN — Medication 667 MILLIGRAM(S): at 17:23

## 2019-11-24 RX ADMIN — PANTOPRAZOLE SODIUM 40 MILLIGRAM(S): 20 TABLET, DELAYED RELEASE ORAL at 07:14

## 2019-11-24 RX ADMIN — HEPARIN SODIUM 7500 UNIT(S): 5000 INJECTION INTRAVENOUS; SUBCUTANEOUS at 21:26

## 2019-11-24 RX ADMIN — HEPARIN SODIUM 7500 UNIT(S): 5000 INJECTION INTRAVENOUS; SUBCUTANEOUS at 06:14

## 2019-11-24 RX ADMIN — Medication 12.5 MILLIGRAM(S): at 07:14

## 2019-11-24 RX ADMIN — Medication 650 MILLIGRAM(S): at 21:26

## 2019-11-24 RX ADMIN — SODIUM CHLORIDE 3 MILLILITER(S): 9 INJECTION INTRAMUSCULAR; INTRAVENOUS; SUBCUTANEOUS at 21:25

## 2019-11-24 RX ADMIN — FENTANYL CITRATE 12.5 MICROGRAM(S): 50 INJECTION INTRAVENOUS at 08:03

## 2019-11-24 RX ADMIN — SODIUM CHLORIDE 3 MILLILITER(S): 9 INJECTION INTRAMUSCULAR; INTRAVENOUS; SUBCUTANEOUS at 06:04

## 2019-11-24 RX ADMIN — HEPARIN SODIUM 7500 UNIT(S): 5000 INJECTION INTRAVENOUS; SUBCUTANEOUS at 13:34

## 2019-11-24 RX ADMIN — Medication 81 MILLIGRAM(S): at 11:23

## 2019-11-24 RX ADMIN — OXYCODONE HYDROCHLORIDE 5 MILLIGRAM(S): 5 TABLET ORAL at 07:15

## 2019-11-24 RX ADMIN — Medication 650 MILLIGRAM(S): at 06:11

## 2019-11-24 RX ADMIN — Medication 667 MILLIGRAM(S): at 11:24

## 2019-11-24 NOTE — PROGRESS NOTE ADULT - ASSESSMENT
60 yo M w PMHx of HTN, HLD, obesity, psoriasis, CAD/RADHA, s/p OPCAB x 3 11/6, admitted for cardiogenci shock in the setting of pericardial effusion and tamponade s/p pericardial window and VAC placement on 11/14 his course cb shock liver and non oliguric GAUTAM associated with refractory hyperkalemia and requirement for RRT.

## 2019-11-24 NOTE — CHART NOTE - NSCHARTNOTEFT_GEN_A_CORE
Wound vac taken down.  Wound bed with some serosanguinous fluid draining but no evidence of puss or purulence. Wound looks healthy with granulating and bleeding tissue.  Incision is 10x2.5x3.  Wound vac sponge was cut to appropriate size to fit the incision.  Vac well sealed, patient tolerated the procedure well. No complications.

## 2019-11-24 NOTE — PROGRESS NOTE ADULT - SUBJECTIVE AND OBJECTIVE BOX
Patient discussed on morning rounds with Dr. Madden    Operation / Date: 11/6/2019 - OPCABx3 (EF 45%) readmitted 11/14 for pericardial effusion/tamponade    SUBJECTIVE ASSESSMENT:  Patient is continuing to feel better.  He is ambulating independently and voiding without difficulties.  Denies HA, dizziness, CP, SOB, palpitations, N/V/D/C, leg swelling or calf pain.    Vital Signs Last 24 Hrs  T(C): 37.1 (24 Nov 2019 09:16), Max: 37.1 (24 Nov 2019 01:01)  T(F): 98.7 (24 Nov 2019 09:16), Max: 98.7 (24 Nov 2019 01:01)  HR: 98 (24 Nov 2019 06:05) (92 - 106)  BP: 167/96 (24 Nov 2019 06:05) (152/81 - 169/102)  BP(mean): 126 (24 Nov 2019 06:05) (121 - 141)  RR: 22 (24 Nov 2019 06:05) (18 - 22)  SpO2: 98% (24 Nov 2019 06:05) (97% - 98%)  I&O's Detail    23 Nov 2019 07:01  -  24 Nov 2019 07:00  --------------------------------------------------------  IN:    IV PiggyBack: 50 mL  Total IN: 50 mL    OUT:    Voided: 2140 mL  Total OUT: 2140 mL    Total NET: -2090 mL      CHEST TUBE:  No.   YI DRAIN:  No.  EPICARDIAL WIRES: No.  TIE DOWNS: No.  IVORY: No.    +wound vac and sternal retention sutures in place    PHYSICAL EXAM:    General: Lying in bed, comfortable, NAD    Neurological: A&O x 3 ZIMMERMAN, no focal deficits    Cardiovascular: S1S2 RRR No M/G/R    Respiratory: CTA b/l No W/R/R    Gastrointestinal: soft, ,NT/ND    Extremities: No edema and no calf tenderness b/l    Vascular: DP and right radial pulse 2 + b/l left radial harvest site healing well.     Incision Sites: upper portion of MSI healing well, no drainage, no erythema, no dehiscence, + retention sutures intact.  wound vac at lower pole of sternal incision taken down, tissue appears healthy with granulation and bleeding, some serosanguinous fluid in wound bed but no puss or purulence noted.  Wound appears smaller than my last exam on 11/19.    LABS:                        9.2    14.23 )-----------( 246      ( 24 Nov 2019 06:48 )             28.5       COUMADIN:  No.    PT/INR - ( 24 Nov 2019 06:48 )   PT: 12.7 sec;   INR: 1.12          PTT - ( 24 Nov 2019 06:48 )  PTT:36.5 sec    11-24    135  |  99  |  81<H>  ----------------------------<  96  4.0   |  20<L>  |  7.67<H>    Ca    8.2<L>      24 Nov 2019 06:48  Phos  6.7     11-24  Mg     2.0     11-24    TPro  6.4  /  Alb  3.3  /  TBili  1.4<H>  /  DBili  x   /  AST  32  /  ALT  5<L>  /  AlkPhos  170<H>  11-23          MEDICATIONS  (STANDING):  aspirin enteric coated 81 milliGRAM(s) Oral daily  atorvastatin 10 milliGRAM(s) Oral at bedtime  ceFAZolin   IVPB 1000 milliGRAM(s) IV Intermittent every 24 hours  clopidogrel Tablet 75 milliGRAM(s) Oral daily  heparin  Injectable 7500 Unit(s) SubCutaneous every 8 hours  metoprolol tartrate 12.5 milliGRAM(s) Oral every 12 hours  pantoprazole    Tablet 40 milliGRAM(s) Oral before breakfast  sodium bicarbonate 650 milliGRAM(s) Oral two times a day  sodium chloride 0.9% lock flush 3 milliLiter(s) IV Push every 8 hours    MEDICATIONS  (PRN):  oxyCODONE    IR 5 milliGRAM(s) Oral every 6 hours PRN Severe Pain (7 - 10)        RADIOLOGY & ADDITIONAL TESTS:

## 2019-11-24 NOTE — PROGRESS NOTE ADULT - PROBLEM SELECTOR PLAN 1
non-oliguric GAUTAM with downtrending Scret and excellent UO  no clinical signs of uremia  continue to trend daily Scret  no indications for hemodialysis at this time

## 2019-11-24 NOTE — PROGRESS NOTE ADULT - SUBJECTIVE AND OBJECTIVE BOX
CC: 125.10HYPERTENSION  RESPIRATORY DISTRESSHYPERTENSION      INTERVAL HISTORY:  feels well  no complaints      ROS: No chest pain, no sob, no abd pain. No n/v/d    PAST MEDICAL & SURGICAL HISTORY:  Presence of stent in LAD coronary artery  CAD in native artery  Psoriasis  Obesity  HLD (hyperlipidemia)  HTN (hypertension)  S/P CABG (coronary artery bypass graft)  H/O arthroscopy of knee  S/P cholecystectomy      PHYSICAL EXAM:  T(C): 36.9 (11-24-19 @ 05:01), Max: 37.1 (11-24-19 @ 01:01)  HR: 92 (11-24-19 @ 00:34)  BP: 160/94 (11-24-19 @ 00:34) (152/81 - 169/102)  RR: 18 (11-24-19 @ 00:34)  SpO2: 97% (11-24-19 @ 00:34)  Wt(kg): --  I&O's Summary    23 Nov 2019 07:01  -  24 Nov 2019 07:00  --------------------------------------------------------  IN: 50 mL / OUT: 1740 mL / NET: -1690 mL      Weight   General: AAO x 3,  NAD.  HEENT: moist mucous membranes, no pallor/cyanosis.  Neck: no JVD visible.  Cardiac: S1, S2. RRR. No murmurs   Respratory: CTA b/l, no access muscle use.   Abdomen: soft. nontender. nondistended  Skin: no rashes.  Extremities: + LE edema b/l  Access:       DATA:                        9.2<L>  14.23<H> )-----------( 246      ( 24 Nov 2019 06:48 )             28.5<L>        135    |  98     |  84<H>  ----------------------------<  98     Ca:8.8   (23 Nov 2019 05:47)  3.9     |  20<L>  |  8.78<H>        TPro  6.4    /  Alb  3.3    /  TBili  1.4<H>  /  DBili  x      /  AST  32     /  ALT  5<L>   /  AlkPhos  170<H>  23 Nov 2019 05:47      Vitamin D, 1, 25-Dihydroxy: <5.0 pg/mL <L> [19.9 - 79.3] (11-23 @ 16:23)      Creatinine, Random Urine: 67 mg/dL (11-22 @ 11:24)  Sodium, Random Urine: 44 mmol/L (11-22 @ 11:24)            MEDICATIONS  (STANDING):  aspirin enteric coated 81 milliGRAM(s) Oral daily  atorvastatin 10 milliGRAM(s) Oral at bedtime  ceFAZolin   IVPB 1000 milliGRAM(s) IV Intermittent every 24 hours  clopidogrel Tablet 75 milliGRAM(s) Oral daily  heparin  Injectable 7500 Unit(s) SubCutaneous every 8 hours  metoprolol tartrate 12.5 milliGRAM(s) Oral every 12 hours  pantoprazole    Tablet 40 milliGRAM(s) Oral before breakfast  sodium bicarbonate 650 milliGRAM(s) Oral two times a day  sodium chloride 0.9% lock flush 3 milliLiter(s) IV Push every 8 hours    MEDICATIONS  (PRN):  oxyCODONE    IR 5 milliGRAM(s) Oral every 6 hours PRN Severe Pain (7 - 10)

## 2019-11-24 NOTE — PROGRESS NOTE ADULT - ASSESSMENT
This is a 60 y/o male with a PMHx of HTN, HLD, obesity, psorasis, cholecystectomy and known CAD (s/p PTCA/RADHA LAD & D1) s/p OPCABx3 (EF 45%) on 11/6/19 and discharged home POD 6 after an uneventful post op course.  On 11/14/2019, the patient presented to [facility] with complaints of severe anxiety and SOB and in the ED they found K>6, BUN/Cr 36/3.22, AST/ALT >5000, Lactate 13 and bleeding was noted from sternotomy incision. The patient was transferred to the CTICU emergently for evaluation and found to have cardiogenic shock with multiorgan failure 2/2 pericardial effusion and tamponade. In the CTICU the sternal incision was opened at the bedside (drained 700cc of fluid) and a wound vac was placed. Patient required emergent hemodialysis due to persistent acidosis/hyperkalemia.  Remainder of his ICU course significant for non oliguric GAUTAM 2/2 ATN with peak Cr of 10, however did not require RRT again.  He was transferred to the stepdown floor on 11/19/2019.  He remained stable on the stepdown floor and passed TOV on 11/22/19.      Plan:  Neurovascular: Stable  -PRNs: Oxycodone 5mg  -fentanyl 12.5 mg IV x 1 for wound vac change today    Cardiovascular: S/p OPCABx3 on 11/6/2019   -Monitor: HR, BP, tele   -Sternal wound has wound vac in place, changed this am.    -CAD: ASA, Plavix, tolerating lopressor 12.5 mg BID  -started on low dose statin, FU LFTs    Respiratory:   -Saturating well on RA   -Encourage IS and ambulation    GI: Transaminitis on admission 2/2 cardiogenic shock/tamponade, now resolved.  -con't ppx with protonix   -PO diet    Renal / :   -non oliguric GAUTAM 2/2 ATN, required 1 session of HD on admission due to refractory hyperkalemia, now improving   -Hyponatremia, now resolved.   -nephrology following, continue to appreciate recs.  -strict I/O's and closely monitor UO   -con't to trend BUN/Cr daily  -con't sodium bicarbonate 650 mg BID, CO2 still 20, consider increasing to 3 x daily?    Endocrine:  no hx of DM or thyroid disease   -A1C: 5.4  -TSH: 1.34    Hematologic: Stable   -HSQ for DVT ppx  -DAP for recent OPCAB    ID:  -remains afebrile  -con't Ancef for deep sternal wound ppx.  Per Dr. Naik should be antibiotics indefinitely to prevent deep sternal wound infection.  Will need to determine if antibiotics on discharge can be PO vs IV.  If IV will need PICC placement tomorrow.    Disposition:  -Discharge home when medically ready This is a 58 y/o male with a PMHx of HTN, HLD, obesity, psorasis, cholecystectomy and known CAD (s/p PTCA/RADHA LAD & D1) s/p OPCABx3 (EF 45%) on 11/6/19 and discharged home POD 6 after an uneventful post op course.  On 11/14/2019, the patient presented to [facility] with complaints of severe anxiety and SOB and in the ED they found K>6, BUN/Cr 36/3.22, AST/ALT >5000, Lactate 13 and bleeding was noted from sternotomy incision. The patient was transferred to the CTICU emergently for evaluation and found to have cardiogenic shock with multiorgan failure 2/2 pericardial effusion and tamponade. In the CTICU the sternal incision was opened at the bedside (drained 700cc of fluid) and a wound vac was placed. Patient required emergent hemodialysis due to persistent acidosis/hyperkalemia.  Remainder of his ICU course significant for non oliguric GAUTAM 2/2 ATN with peak Cr of 10, however did not require RRT again.  He was transferred to the stepdown floor on 11/19/2019.  He remained stable on the stepdown floor and passed TOV on 11/22/19.      Plan:  Neurovascular: Stable  -PRNs: Oxycodone 5mg  -fentanyl 12.5 mg IV x 1 for wound vac change today    Cardiovascular: S/p OPCABx3 on 11/6/2019   -Monitor: HR, BP, tele   -Sternal wound has wound vac in place, changed this am.    -CAD: ASA, Plavix, tolerating lopressor 12.5 mg BID  -started on low dose statin, FU LFTs    Respiratory:   -Saturating well on RA   -Encourage IS and ambulation    GI: Transaminitis on admission 2/2 cardiogenic shock/tamponade, now resolved.  -con't ppx with protonix   -PO diet    Renal / :   -non oliguric GAUTAM 2/2 ATN, required 1 session of HD on admission due to refractory hyperkalemia, now improving   -Hyponatremia, now resolved.   -nephrology following, continue to appreciate recs.  -strict I/O's and closely monitor UO   -con't to trend BUN/Cr daily  -con't sodium bicarbonate 650 mg BID, CO2 still 20, consider increasing to 3 x daily?  -Phosphorus elevated start phoslo TID    Endocrine:  no hx of DM or thyroid disease   -A1C: 5.4  -TSH: 1.34    Hematologic: Stable   -HSQ for DVT ppx  -DAP for recent OPCAB    ID:  -remains afebrile  -con't Ancef for deep sternal wound ppx.  Per Dr. Naik should be antibiotics indefinitely to prevent deep sternal wound infection.  Will need to determine if antibiotics on discharge can be PO vs IV.  If IV will need PICC placement tomorrow.    Disposition:  -Discharge home when medically ready

## 2019-11-25 ENCOUNTER — TRANSCRIPTION ENCOUNTER (OUTPATIENT)
Age: 59
End: 2019-11-25

## 2019-11-25 VITALS
SYSTOLIC BLOOD PRESSURE: 160 MMHG | RESPIRATION RATE: 18 BRPM | DIASTOLIC BLOOD PRESSURE: 86 MMHG | OXYGEN SATURATION: 99 % | HEART RATE: 88 BPM

## 2019-11-25 LAB
ALBUMIN SERPL ELPH-MCNC: 3.1 G/DL — LOW (ref 3.3–5)
ALP SERPL-CCNC: 140 U/L — HIGH (ref 40–120)
ALT FLD-CCNC: 8 U/L — LOW (ref 10–45)
ANION GAP SERPL CALC-SCNC: 15 MMOL/L — SIGNIFICANT CHANGE UP (ref 5–17)
AST SERPL-CCNC: 29 U/L — SIGNIFICANT CHANGE UP (ref 10–40)
BILIRUB SERPL-MCNC: 0.8 MG/DL — SIGNIFICANT CHANGE UP (ref 0.2–1.2)
BUN SERPL-MCNC: 78 MG/DL — HIGH (ref 7–23)
CALCIUM SERPL-MCNC: 8.5 MG/DL — SIGNIFICANT CHANGE UP (ref 8.4–10.5)
CHLORIDE SERPL-SCNC: 102 MMOL/L — SIGNIFICANT CHANGE UP (ref 96–108)
CO2 SERPL-SCNC: 21 MMOL/L — LOW (ref 22–31)
CORTICOSTEROID BINDING GLOBULIN RESULT: 1.1 MG/DL — LOW
CORTIS F/TOTAL MFR SERPL: 45 % — SIGNIFICANT CHANGE UP
CORTIS SERPL-MCNC: 13 UG/DL — SIGNIFICANT CHANGE UP
CORTISOL, FREE RESULT: 5.9 UG/DL — HIGH
CREAT SERPL-MCNC: 6.39 MG/DL — HIGH (ref 0.5–1.3)
GLUCOSE SERPL-MCNC: 97 MG/DL — SIGNIFICANT CHANGE UP (ref 70–99)
HCT VFR BLD CALC: 26.3 % — LOW (ref 39–50)
HGB BLD-MCNC: 8.4 G/DL — LOW (ref 13–17)
MAGNESIUM SERPL-MCNC: 1.8 MG/DL — SIGNIFICANT CHANGE UP (ref 1.6–2.6)
MCHC RBC-ENTMCNC: 30.1 PG — SIGNIFICANT CHANGE UP (ref 27–34)
MCHC RBC-ENTMCNC: 31.9 GM/DL — LOW (ref 32–36)
MCV RBC AUTO: 94.3 FL — SIGNIFICANT CHANGE UP (ref 80–100)
NRBC # BLD: 0 /100 WBCS — SIGNIFICANT CHANGE UP (ref 0–0)
PHOSPHATE SERPL-MCNC: 6 MG/DL — HIGH (ref 2.5–4.5)
PLATELET # BLD AUTO: 270 K/UL — SIGNIFICANT CHANGE UP (ref 150–400)
POTASSIUM SERPL-MCNC: 4.1 MMOL/L — SIGNIFICANT CHANGE UP (ref 3.5–5.3)
POTASSIUM SERPL-SCNC: 4.1 MMOL/L — SIGNIFICANT CHANGE UP (ref 3.5–5.3)
PROT SERPL-MCNC: 6.1 G/DL — SIGNIFICANT CHANGE UP (ref 6–8.3)
RBC # BLD: 2.79 M/UL — LOW (ref 4.2–5.8)
RBC # FLD: 14.6 % — HIGH (ref 10.3–14.5)
SODIUM SERPL-SCNC: 138 MMOL/L — SIGNIFICANT CHANGE UP (ref 135–145)
WBC # BLD: 11.85 K/UL — HIGH (ref 3.8–10.5)
WBC # FLD AUTO: 11.85 K/UL — HIGH (ref 3.8–10.5)

## 2019-11-25 PROCEDURE — 82140 ASSAY OF AMMONIA: CPT

## 2019-11-25 PROCEDURE — 93005 ELECTROCARDIOGRAM TRACING: CPT

## 2019-11-25 PROCEDURE — 85025 COMPLETE CBC W/AUTO DIFF WBC: CPT

## 2019-11-25 PROCEDURE — 87075 CULTR BACTERIA EXCEPT BLOOD: CPT

## 2019-11-25 PROCEDURE — 82310 ASSAY OF CALCIUM: CPT

## 2019-11-25 PROCEDURE — 80076 HEPATIC FUNCTION PANEL: CPT

## 2019-11-25 PROCEDURE — 84100 ASSAY OF PHOSPHORUS: CPT

## 2019-11-25 PROCEDURE — 83036 HEMOGLOBIN GLYCOSYLATED A1C: CPT

## 2019-11-25 PROCEDURE — 82330 ASSAY OF CALCIUM: CPT

## 2019-11-25 PROCEDURE — 84540 ASSAY OF URINE/UREA-N: CPT

## 2019-11-25 PROCEDURE — 93306 TTE W/DOPPLER COMPLETE: CPT

## 2019-11-25 PROCEDURE — 36415 COLL VENOUS BLD VENIPUNCTURE: CPT

## 2019-11-25 PROCEDURE — 83935 ASSAY OF URINE OSMOLALITY: CPT

## 2019-11-25 PROCEDURE — 99024 POSTOP FOLLOW-UP VISIT: CPT

## 2019-11-25 PROCEDURE — 87070 CULTURE OTHR SPECIMN AEROBIC: CPT

## 2019-11-25 PROCEDURE — 85730 THROMBOPLASTIN TIME PARTIAL: CPT

## 2019-11-25 PROCEDURE — 76770 US EXAM ABDO BACK WALL COMP: CPT

## 2019-11-25 PROCEDURE — 84156 ASSAY OF PROTEIN URINE: CPT

## 2019-11-25 PROCEDURE — 84132 ASSAY OF SERUM POTASSIUM: CPT

## 2019-11-25 PROCEDURE — 86900 BLOOD TYPING SEROLOGIC ABO: CPT

## 2019-11-25 PROCEDURE — 71046 X-RAY EXAM CHEST 2 VIEWS: CPT | Mod: 26

## 2019-11-25 PROCEDURE — 94640 AIRWAY INHALATION TREATMENT: CPT

## 2019-11-25 PROCEDURE — 81001 URINALYSIS AUTO W/SCOPE: CPT

## 2019-11-25 PROCEDURE — 85027 COMPLETE CBC AUTOMATED: CPT

## 2019-11-25 PROCEDURE — 83880 ASSAY OF NATRIURETIC PEPTIDE: CPT

## 2019-11-25 PROCEDURE — 82570 ASSAY OF URINE CREATININE: CPT

## 2019-11-25 PROCEDURE — 82436 ASSAY OF URINE CHLORIDE: CPT

## 2019-11-25 PROCEDURE — 86923 COMPATIBILITY TEST ELECTRIC: CPT

## 2019-11-25 PROCEDURE — 71045 X-RAY EXAM CHEST 1 VIEW: CPT

## 2019-11-25 PROCEDURE — 97116 GAIT TRAINING THERAPY: CPT

## 2019-11-25 PROCEDURE — 83930 ASSAY OF BLOOD OSMOLALITY: CPT

## 2019-11-25 PROCEDURE — P9016: CPT

## 2019-11-25 PROCEDURE — 87340 HEPATITIS B SURFACE AG IA: CPT

## 2019-11-25 PROCEDURE — 97162 PT EVAL MOD COMPLEX 30 MIN: CPT

## 2019-11-25 PROCEDURE — 80048 BASIC METABOLIC PNL TOTAL CA: CPT

## 2019-11-25 PROCEDURE — 84443 ASSAY THYROID STIM HORMONE: CPT

## 2019-11-25 PROCEDURE — 90935 HEMODIALYSIS ONE EVALUATION: CPT

## 2019-11-25 PROCEDURE — 71250 CT THORAX DX C-: CPT

## 2019-11-25 PROCEDURE — 80307 DRUG TEST PRSMV CHEM ANLYZR: CPT

## 2019-11-25 PROCEDURE — 99232 SBSQ HOSP IP/OBS MODERATE 35: CPT

## 2019-11-25 PROCEDURE — 84300 ASSAY OF URINE SODIUM: CPT

## 2019-11-25 PROCEDURE — 86850 RBC ANTIBODY SCREEN: CPT

## 2019-11-25 PROCEDURE — 84133 ASSAY OF URINE POTASSIUM: CPT

## 2019-11-25 PROCEDURE — 80053 COMPREHEN METABOLIC PANEL: CPT

## 2019-11-25 PROCEDURE — 84295 ASSAY OF SERUM SODIUM: CPT

## 2019-11-25 PROCEDURE — 36430 TRANSFUSION BLD/BLD COMPNT: CPT

## 2019-11-25 PROCEDURE — 71046 X-RAY EXAM CHEST 2 VIEWS: CPT

## 2019-11-25 PROCEDURE — 87040 BLOOD CULTURE FOR BACTERIA: CPT

## 2019-11-25 PROCEDURE — 82803 BLOOD GASES ANY COMBINATION: CPT

## 2019-11-25 PROCEDURE — 86706 HEP B SURFACE ANTIBODY: CPT

## 2019-11-25 PROCEDURE — 83605 ASSAY OF LACTIC ACID: CPT

## 2019-11-25 PROCEDURE — 82962 GLUCOSE BLOOD TEST: CPT

## 2019-11-25 PROCEDURE — 94660 CPAP INITIATION&MGMT: CPT

## 2019-11-25 PROCEDURE — 86705 HEP B CORE ANTIBODY IGM: CPT

## 2019-11-25 PROCEDURE — 83970 ASSAY OF PARATHORMONE: CPT

## 2019-11-25 PROCEDURE — 97110 THERAPEUTIC EXERCISES: CPT

## 2019-11-25 PROCEDURE — 83735 ASSAY OF MAGNESIUM: CPT

## 2019-11-25 PROCEDURE — 86901 BLOOD TYPING SEROLOGIC RH(D): CPT

## 2019-11-25 PROCEDURE — 85610 PROTHROMBIN TIME: CPT

## 2019-11-25 PROCEDURE — 82652 VIT D 1 25-DIHYDROXY: CPT

## 2019-11-25 PROCEDURE — 84105 ASSAY OF URINE PHOSPHORUS: CPT

## 2019-11-25 PROCEDURE — P9045: CPT

## 2019-11-25 RX ORDER — CEPHALEXIN 500 MG
1 CAPSULE ORAL
Qty: 20 | Refills: 0
Start: 2019-11-25 | End: 2019-12-14

## 2019-11-25 RX ORDER — MAGNESIUM OXIDE 400 MG ORAL TABLET 241.3 MG
800 TABLET ORAL ONCE
Refills: 0 | Status: COMPLETED | OUTPATIENT
Start: 2019-11-25 | End: 2019-11-25

## 2019-11-25 RX ORDER — METOPROLOL TARTRATE 50 MG
1 TABLET ORAL
Qty: 60 | Refills: 0
Start: 2019-11-25 | End: 2019-12-24

## 2019-11-25 RX ORDER — ASPIRIN/CALCIUM CARB/MAGNESIUM 324 MG
1 TABLET ORAL
Qty: 30 | Refills: 0
Start: 2019-11-25 | End: 2019-12-24

## 2019-11-25 RX ORDER — CLOPIDOGREL BISULFATE 75 MG/1
1 TABLET, FILM COATED ORAL
Qty: 30 | Refills: 0
Start: 2019-11-25 | End: 2019-12-24

## 2019-11-25 RX ORDER — METOPROLOL TARTRATE 50 MG
12.5 TABLET ORAL ONCE
Refills: 0 | Status: COMPLETED | OUTPATIENT
Start: 2019-11-25 | End: 2019-11-25

## 2019-11-25 RX ORDER — PANTOPRAZOLE SODIUM 20 MG/1
1 TABLET, DELAYED RELEASE ORAL
Qty: 30 | Refills: 0
Start: 2019-11-25 | End: 2019-12-24

## 2019-11-25 RX ORDER — ATORVASTATIN CALCIUM 80 MG/1
1 TABLET, FILM COATED ORAL
Qty: 30 | Refills: 0
Start: 2019-11-25 | End: 2019-12-24

## 2019-11-25 RX ORDER — METOPROLOL TARTRATE 50 MG
25 TABLET ORAL
Refills: 0 | Status: DISCONTINUED | OUTPATIENT
Start: 2019-11-25 | End: 2019-11-25

## 2019-11-25 RX ORDER — SODIUM BICARBONATE 1 MEQ/ML
1 SYRINGE (ML) INTRAVENOUS
Qty: 90 | Refills: 0
Start: 2019-11-25 | End: 2019-12-24

## 2019-11-25 RX ORDER — OXYCODONE HYDROCHLORIDE 5 MG/1
1 TABLET ORAL
Qty: 28 | Refills: 0
Start: 2019-11-25 | End: 2019-12-01

## 2019-11-25 RX ORDER — CALCIUM ACETATE 667 MG
1 TABLET ORAL
Qty: 90 | Refills: 0
Start: 2019-11-25 | End: 2019-12-24

## 2019-11-25 RX ADMIN — CLOPIDOGREL BISULFATE 75 MILLIGRAM(S): 75 TABLET, FILM COATED ORAL at 11:29

## 2019-11-25 RX ADMIN — Medication 12.5 MILLIGRAM(S): at 05:26

## 2019-11-25 RX ADMIN — SODIUM CHLORIDE 3 MILLILITER(S): 9 INJECTION INTRAMUSCULAR; INTRAVENOUS; SUBCUTANEOUS at 05:27

## 2019-11-25 RX ADMIN — Medication 667 MILLIGRAM(S): at 11:30

## 2019-11-25 RX ADMIN — Medication 667 MILLIGRAM(S): at 07:31

## 2019-11-25 RX ADMIN — HEPARIN SODIUM 7500 UNIT(S): 5000 INJECTION INTRAVENOUS; SUBCUTANEOUS at 05:27

## 2019-11-25 RX ADMIN — HEPARIN SODIUM 7500 UNIT(S): 5000 INJECTION INTRAVENOUS; SUBCUTANEOUS at 14:30

## 2019-11-25 RX ADMIN — Medication 650 MILLIGRAM(S): at 05:26

## 2019-11-25 RX ADMIN — Medication 81 MILLIGRAM(S): at 11:29

## 2019-11-25 RX ADMIN — MAGNESIUM OXIDE 400 MG ORAL TABLET 800 MILLIGRAM(S): 241.3 TABLET ORAL at 09:45

## 2019-11-25 RX ADMIN — OXYCODONE HYDROCHLORIDE 5 MILLIGRAM(S): 5 TABLET ORAL at 00:36

## 2019-11-25 RX ADMIN — Medication 100 MILLIGRAM(S): at 11:29

## 2019-11-25 RX ADMIN — PANTOPRAZOLE SODIUM 40 MILLIGRAM(S): 20 TABLET, DELAYED RELEASE ORAL at 06:06

## 2019-11-25 RX ADMIN — OXYCODONE HYDROCHLORIDE 5 MILLIGRAM(S): 5 TABLET ORAL at 01:30

## 2019-11-25 RX ADMIN — Medication 12.5 MILLIGRAM(S): at 14:30

## 2019-11-25 RX ADMIN — Medication 25 MILLIGRAM(S): at 16:26

## 2019-11-25 RX ADMIN — Medication 667 MILLIGRAM(S): at 16:26

## 2019-11-25 RX ADMIN — Medication 650 MILLIGRAM(S): at 14:30

## 2019-11-25 RX ADMIN — SODIUM CHLORIDE 3 MILLILITER(S): 9 INJECTION INTRAMUSCULAR; INTRAVENOUS; SUBCUTANEOUS at 14:28

## 2019-11-25 NOTE — PROGRESS NOTE ADULT - SUBJECTIVE AND OBJECTIVE BOX
O/N Events: ZA  Subjective:  feels good, denies any CP/SOB/LE edema improved, kidney function improving, Cr keeps trending down, still mildly acidotic bicarb 21, phos 6 otherwise no electrolyte abnormalities  no uremic symptoms     VITALS  Vital Signs Last 24 Hrs  T(C): 37.2 (25 Nov 2019 14:10), Max: 37.5 (25 Nov 2019 05:34)  T(F): 98.9 (25 Nov 2019 14:10), Max: 99.5 (25 Nov 2019 05:34)  HR: 96 (25 Nov 2019 09:05) (92 - 100)  BP: 162/82 (25 Nov 2019 09:05) (159/89 - 181/103)  BP(mean): 123 (25 Nov 2019 09:05) (115 - 138)  RR: 17 (25 Nov 2019 09:05) (16 - 20)  SpO2: 98% (25 Nov 2019 09:05) (95% - 99%)    PHYSICAL EXAM  General: AAO x 3,  NAD.  HEENT: MMM  Neck: no JVD visible  Cardiac: S1, S2. RRR, no MGR  Respiratory: CTA b/l  Abdomen: soft, NTND  Extremities: + LE edema b/l improving     MEDICATIONS  (STANDING):  aspirin enteric coated 81 milliGRAM(s) Oral daily  atorvastatin 10 milliGRAM(s) Oral at bedtime  calcium acetate 667 milliGRAM(s) Oral three times a day with meals  ceFAZolin   IVPB 1000 milliGRAM(s) IV Intermittent every 24 hours  clopidogrel Tablet 75 milliGRAM(s) Oral daily  heparin  Injectable 7500 Unit(s) SubCutaneous every 8 hours  metoprolol tartrate 25 milliGRAM(s) Oral two times a day  pantoprazole    Tablet 40 milliGRAM(s) Oral before breakfast  sodium bicarbonate 650 milliGRAM(s) Oral three times a day  sodium chloride 0.9% lock flush 3 milliLiter(s) IV Push every 8 hours    MEDICATIONS  (PRN):  oxyCODONE    IR 5 milliGRAM(s) Oral every 6 hours PRN Severe Pain (7 - 10)      LABS                        8.4    11.85 )-----------( 270      ( 25 Nov 2019 06:14 )             26.3     11-25    138  |  102  |  78<H>  ----------------------------<  97  4.1   |  21<L>  |  6.39<H>    Ca    8.5      25 Nov 2019 06:14  Phos  6.0     11-25  Mg     1.8     11-25    TPro  6.1  /  Alb  3.1<L>  /  TBili  0.8  /  DBili  x   /  AST  29  /  ALT  8<L>  /  AlkPhos  140<H>  11-25    LIVER FUNCTIONS - ( 25 Nov 2019 06:14 )  Alb: 3.1 g/dL / Pro: 6.1 g/dL / ALK PHOS: 140 U/L / ALT: 8 U/L / AST: 29 U/L / GGT: x           PT/INR - ( 24 Nov 2019 06:48 )   PT: 12.7 sec;   INR: 1.12          PTT - ( 24 Nov 2019 06:48 )  PTT:36.5 sec

## 2019-11-25 NOTE — DISCHARGE NOTE PROVIDER - NSDCFUSCHEDAPPT_GEN_ALL_CORE_FT
CRYSTAL MORLEY ; 12/26/2019 ; [facility] [address] CRYSTAL MORLEY ; 12/26/2019 ; [facility] [address] CRYSTAL MORLEY ; 12/26/2019 ; [facility] [address] CRYSTAL MORLEY ; 12/26/2019 ; [facility] [address]

## 2019-11-25 NOTE — DISCHARGE NOTE PROVIDER - NSDCFUADDAPPT_GEN_ALL_CORE_FT
-Please follow up with Dr. Torres on ___.  The office is located at [facility], [facility], 4th floor. Call us with any questions #[phone].    -Please follow up with Dr. Barbour (nephrologist/kidney physician) regarding your kidney function. The office is going to call you with your appointment time. If you have any questions please call the office at tel: [phone]. The office is located at [address] , 5th floor.     -Please make an appointment with your cardiologist or your primary care physician to manage your blood pressure. -Please follow up with Dr. Torres on 12/3/19 at 11:15am.  The office is located at [facility], [facility], 4th floor. Call us with any questions #[phone].    -Please follow up with Dr. Reyes (nephrologist/kidney physician) regarding your kidney function. The office is going to call you with your appointment time. If you have any questions please call the office at tel: [phone]. The office is located at [address] , 5th floor.     -Please make an appointment with your cardiologist or your primary care physician to manage your blood pressure medications.

## 2019-11-25 NOTE — PROGRESS NOTE ADULT - ASSESSMENT
60 yo M w PMHx of HTN, HLD, obesity, psoriasis, CAD/RADHA, s/p OPCAB x 3 11/6, admitted for cardiogenci shock in the setting of pericardial effusion and tamponade s/p pericardial window and VAC placement on 11/14 his course cb shock liver and non oliguric GAUTAM associated with refractory hyperkalemia and requirement for RRT.      recovering non oliguric ATN;   metabolic acidosis  hyperphosphatemia   anemia  leukocytosis  nephrologically stable for dc home  with close f/u of labs- 1 week-  c/w PO4 binder (calcium acetate- 1 per meal) and with bicarbonate 1 BID  until review repeat labs

## 2019-11-25 NOTE — DISCHARGE NOTE PROVIDER - NSDCCPCAREPLAN_GEN_ALL_CORE_FT
PRINCIPAL DISCHARGE DIAGNOSIS  Diagnosis: Pericardial effusion  Assessment and Plan of Treatment:       SECONDARY DISCHARGE DIAGNOSES  Diagnosis: Acute kidney injury  Assessment and Plan of Treatment:

## 2019-11-25 NOTE — PROGRESS NOTE ADULT - ASSESSMENT
D/C Instructions:   -Please follow up with Dr. Torres within 1 week.  The office is located at [facility], [facility], 4th floor. Call us with any questions #[phone].    -Please follow up with Dr. Barbour (nephrologist/kidney physician) regarding your kidney function. The office is going to call you with your appointment time. If you have any questions please call the office at tel: [phone]. The office is located at [address] , 5th floor.     -Please make an appointment with your cardiologist or your primary care physician to manage your blood pressure.     - No heavy lifting/straining, Walking - Indoors allowed, Walking - Outdoors allowed    - It is very important you schedule to see your nephrologist weekly to get blood work done to trend your kidney function. Please call Dr. Barbour if you have any concerns.     -Walk daily as tolerated and use your incentive spirometer every hour.    -You have a wound vaccuum in place on your chest. That will be changed every 3 days and will be changed next on Wednesday 11/27/2019. In case the material holding it in place comes off early, please reinforce the dressing with tegaderm dressings with the supplies that was provided to you and call the office immediately.     -You are being discharged on 20 days of oral antibiotics because of your chest wound that is open. Please continue taking these to prevent infection in your wound.     -No driving or strenuous activity/exercise for 6 weeks, or until cleared by your surgeon.    -Call your doctor if you have shortness of breath, chest pain not relieved by pain medication, dizziness, fever >101.5, of increased redness or drainage from incisions.    -It is very important that you continue your medications as prescribed after you are discharged.  You should refer to the medication reconciliation form provided to you on your discharge to information as to what medications to take and when.      -Additionally, your medications have been sent to a pharmacy.  These medications should be picked up from your pharmacy on the DAY OF DISCHARGE.  This helps to ensure that you have the appropriate medications available while at home.  If you have any issues obtaining your medications on the day of your discharge, please call [phone] for further assistance. D/C Instructions:   -Please follow up with Dr. Torres on 12/3/19 at 11:15am.  The office is located at [facility], [facility], 4th floor. Call us with any questions #[phone].    -Please follow up with Dr. Reyes (nephrologist/kidney physician) regarding your kidney function. The office is going to call you with your appointment time. If you have any questions please call the office at tel: [phone]. The office is located at [address] , 5th floor.     -Please make an appointment with your cardiologist or your primary care physician to manage your blood pressure medications.    -No heavy lifting/straining, Walking - Indoors allowed, Walking - Outdoors allowed    - It is very important you schedule to see your nephrologist weekly to get blood work done to trend your kidney function. Please call Dr. Reyes if you have any concerns.     -Walk daily as tolerated and use your incentive spirometer every hour.    -You have a wound vaccuum in place on your chest. That will be changed every 3 days and will be changed next on Wednesday 11/27/2019. In case the material holding it in place comes off early, please apply a wet to dry dressing with the supplies we provided you and call Dr. Torres's office immediately for further instruction.     -You are being discharged on 20 days of oral antibiotics because of your chest wound that is open. Please continue taking these to prevent infection in your wound.     -No driving or strenuous activity/exercise for 6 weeks, or until cleared by your surgeon.    -Call your doctor if you have shortness of breath, chest pain not relieved by pain medication, dizziness, fever >101.5, of increased redness or drainage from incisions.    -It is very important that you continue your medications as prescribed after you are discharged.  You should refer to the medication reconciliation form provided to you on your discharge to information as to what medications to take and when.      -Additionally, your medications have been sent to a pharmacy.  These medications should be picked up from your pharmacy on the DAY OF DISCHARGE.  This helps to ensure that you have the appropriate medications available while at home.  If you have any issues obtaining your medications on the day of your discharge, please call [phone] for further assistance.

## 2019-11-25 NOTE — DISCHARGE NOTE PROVIDER - CARE PROVIDERS DIRECT ADDRESSES
[email or web address]

## 2019-11-25 NOTE — DISCHARGE NOTE PROVIDER - PROVIDER TOKENS
PROVIDER:[TOKEN:[[id number]]],FREE:[LAST:[Km],FIRST:[Gloria],PHONE:[phone]],FAX:[(   )    -],ADDRESS:[[address] [place] [address], [address] [place] [address]]] PROVIDER:[TOKEN:[[id number]]],PROVIDER:[TOKEN:[[id number]]]

## 2019-11-25 NOTE — DISCHARGE NOTE PROVIDER - CARE PROVIDER_API CALL
Thomas Torres)  Cardiovascular Surgery  [address], [address]th [place] [address]  Phone: [phone]  Fax: [phone]  Follow Up Time:     Gloria Barbour  [address], [address]th [place] [address]  Phone: [phone]  Fax: (   )    -  Follow Up Time: Thomas Torres)  Cardiovascular Surgery  [address], [address]th [place] [address]  Phone: [phone]  Fax: [phone]  Follow Up Time:     Gloria Reyes)  Internal Medicine; Nephrology  [address], 5th Floor  [place] [address]  Phone: [phone]  Fax: [phone]  Follow Up Time:

## 2019-11-25 NOTE — PROGRESS NOTE ADULT - SUBJECTIVE AND OBJECTIVE BOX
Patient seen and examined at bedside.   Feeling much better today  good urine output-spontaneous voiding-   No IVF  creatinine improving  T(C): , Max: 37.5 (11-25-19 @ 05:34)  T(F): , Max: 99.5 (11-25-19 @ 05:34)  HR: 96 (11-25-19 @ 09:05)  BP: 162/82 (11-25-19 @ 09:05)  BP(mean): 123 (11-25-19 @ 09:05)  RR: 17 (11-25-19 @ 09:05)  SpO2: 98% (11-25-19 @ 09:05)  Wt(kg): --    11-24 @ 07:01  -  11-25 @ 07:00  --------------------------------------------------------  IN:    Oral Fluid: 400 mL  Total IN: 400 mL    OUT:    Voided: 1850 mL  Total OUT: 1850 mL    Total NET: -1450 mL            aspirin enteric coated 81 daily  atorvastatin 10 at bedtime  calcium acetate 667 three times a day with meals  ceFAZolin   IVPB 1000 every 24 hours  clopidogrel Tablet 75 daily  heparin  Injectable 7500 every 8 hours  metoprolol tartrate 25 two times a day  pantoprazole    Tablet 40 before breakfast  sodium bicarbonate 650 three times a day  sodium chloride 0.9% lock flush 3 every 8 hours    Allergies    No Known Allergies      PHYSICAL EXAM:  Constitutional:  No acute distress  Back: No CVA tenderness  Respiratory: Clear to auscultation but with reduced BS bases  Cardiovascular: S1, S2.  Regular rate and rhythm.    Gastrointestinal: soft, non-tender  Vasc/Extremities:  tr lower extremity edema.    Neurological: No focal deficits.  Skin: Warm. Dry.    Psychiatric: Normal affect.      LABS:                        8.4    11.85 )-----------( 270      ( 25 Nov 2019 06:14 )             26.3     11-25    138  |  102  |  78<H>  ----------------------------<  97  4.1   |  21<L>  |  6.39<H>    Ca    8.5      25 Nov 2019 06:14  Phos  6.0     11-25  Mg     1.8     11-25    TPro  6.1  /  Alb  3.1<L>  /  TBili  0.8  /  DBili  x   /  AST  29  /  ALT  8<L>  /  AlkPhos  140<H>  11-25      PT/INR - ( 24 Nov 2019 06:48 )   PT: 12.7 sec;   INR: 1.12          PTT - ( 24 Nov 2019 06:48 )  PTT:36.5 sec          RADIOLOGY & ADDITIONAL STUDIES:

## 2019-11-25 NOTE — PROGRESS NOTE ADULT - ASSESSMENT
A/p  58 yo M w PMHx of HTN, HLD, obesity, psoriasis, CAD/RADHA, s/p OPCAB x 3 11/6, admitted for cardiogenci shock in the setting of pericardial effusion and tamponade s/p pericardial window and VAC placement on 11/14 his course cb shock liver and non oliguric GAUTAM associated with refractory hyperkalemia and requirement for RRT

## 2019-11-25 NOTE — PROGRESS NOTE ADULT - REASON FOR ADMISSION
Pericardial Effusion
sternal bleeding

## 2019-11-25 NOTE — DISCHARGE NOTE PROVIDER - NSDCFUADDINST_GEN_ALL_CORE_FT
- It is very important you schedule to see your nephrologist weekly to get blood work done to trend your kidney function. Please call Dr. Barbour if you have any concerns.     -Walk daily as tolerated and use your incentive spirometer every hour.    -You have a wound vaccuum in place on your chest. That will be changed every 3 days and will be changed next on Wednesday 11/27/2019. In case the material holding it in place comes off early, please reinforce the dressing with tegaderm dressings with the supplies that was provided to you and call the office immediately.     -You are being discharged on 20 days of oral antibiotics because of your chest wound that is open. Please continue taking these to prevent infection in your wound.     -No driving or strenuous activity/exercise for 6 weeks, or until cleared by your surgeon.    -Call your doctor if you have shortness of breath, chest pain not relieved by pain medication, dizziness, fever >101.5, of increased redness or drainage from incisions.    -It is very important that you continue your medications as prescribed after you are discharged.  You should refer to the medication reconciliation form provided to you on your discharge to information as to what medications to take and when.      -Additionally, your medications have been sent to a pharmacy.  These medications should be picked up from your pharmacy on the DAY OF DISCHARGE.  This helps to ensure that you have the appropriate medications available while at home.  If you have any issues obtaining your medications on the day of your discharge, please call [phone] for further assistance. - It is very important you schedule to see your nephrologist weekly to get blood work done to trend your kidney function. Please call Dr. Reyes if you have any concerns.     -Walk daily as tolerated and use your incentive spirometer every hour.    -You have a wound vaccuum in place on your chest. That will be changed every 3 days and will be changed next on Wednesday 11/27/2019. In case the material holding it in place comes off early, please apply a wet to dry dressing with the supplies we provided you and call Dr. Torres's office immediately for further instruction.     -You are being discharged on 20 days of oral antibiotics because of your chest wound that is open. Please continue taking these to prevent infection in your wound.     -No driving or strenuous activity/exercise for 6 weeks, or until cleared by your surgeon.    -Call your doctor if you have shortness of breath, chest pain not relieved by pain medication, dizziness, fever >101.5, of increased redness or drainage from incisions.    -It is very important that you continue your medications as prescribed after you are discharged.  You should refer to the medication reconciliation form provided to you on your discharge to information as to what medications to take and when.      -Additionally, your medications have been sent to a pharmacy.  These medications should be picked up from your pharmacy on the DAY OF DISCHARGE.  This helps to ensure that you have the appropriate medications available while at home.  If you have any issues obtaining your medications on the day of your discharge, please call [phone] for further assistance.

## 2019-11-25 NOTE — DISCHARGE NOTE PROVIDER - HOSPITAL COURSE
This is a 60 y/o male with a PMHx of HTN, HLD, obesity, psorasis, cholecystectomy and known CAD (s/p PTCA/RADHA LAD & D1) s/p OPCABx3 (EF 45%) on 11/6/19 and discharged home POD 6 after an uneventful post op course.  On 11/14/2019, the patient presented to [facility] with complaints of severe anxiety and SOB and in the ED they found K>6, BUN/Cr 36/3.22, AST/ALT >5000, Lactate 13 and bleeding was noted from sternotomy incision. The patient was transferred to the CTICU emergently for evaluation and found to have cardiogenic shock with multiorgan failure 2/2 pericardial effusion and tamponade. In the CTICU the sternal incision was opened at the bedside (drained 700cc of fluid) and a wound vac was placed. Patient required 1 episode of emergent hemodialysis due to persistent acidosis/hyperkalemia.  Remainder of his ICU course significant for non oliguric GAUTAM 2/2 ATN with peak Cr of 10, however did not require RRT again.  He was transferred to the stepdown floor on 11/19/2019.  He remained stable on the stepdown floor with slowly resolving BUN/Cr and passed TOV on 11/22/19.  On 11/25/19 renal states the patient is stable from their perspective and will need weekly lab draws to trend BUN/Cr once d/c home. They also recommend he c/w sodium bicarb and calcium acetate upon d/c. Patient also is to be switch to IV to PO abx today in anticipation for d/c. Wound vac is still in place and was last changed yesterday and due to be changed tomorrow on 11/26/19. Patient will be Per Dr. Torres, the patient is medically ready to be d/c with home care.         CABG    Aspirin               [ x ] Yes  [  ] Contraindicated, Reason_______________________________    Beta-Blocker     [ x ] Yes  [  ]Contraindicated, Reason_______________________________    Statin                 [ x ] Yes  [  ] Contraindicated, Reason_______________________________            Over 35 minutes was spent with the patient reviewing the discharge material including medications, follow up appointments, recovery, concerning symptoms, and how to contact their health care providers if they have questions This is a 60 y/o male with a PMHx of HTN, HLD, obesity, psorasis, cholecystectomy and known CAD (s/p PTCA/RADHA LAD & D1) s/p OPCABx3 (EF 45%) on 11/6/19 and discharged home POD 6 after an uneventful post op course.  On 11/14/2019, the patient presented to [facility] with complaints of severe anxiety and SOB and in the ED they found K>6, BUN/Cr 36/3.22, AST/ALT >5000, Lactate 13 and bleeding was noted from sternotomy incision. The patient was transferred to the CTICU emergently for evaluation and found to have cardiogenic shock with multiorgan failure 2/2 pericardial effusion and tamponade. In the CTICU the sternal incision was opened at the bedside (drained 700cc of fluid) and a wound vac was placed. Patient required 1 episode of emergent hemodialysis due to persistent acidosis/hyperkalemia.  Remainder of his ICU course significant for non oliguric GAUTAM 2/2 ATN with peak Cr of 10, however did not require RRT again.  He was transferred to the stepdown floor on 11/19/2019.  He remained stable on the stepdown floor with slowly resolving BUN/Cr and passed TOV on 11/22/19.  On 11/25/19 renal states the patient is stable from their perspective and will need weekly lab draws to trend BUN/Cr once d/c home. They also recommend he c/w sodium bicarb and calcium acetate upon d/c. Patient also is to be switched from IV to PO abx today in anticipation for d/c. Wound vac is still in place and was last changed yesterday and due to be changed tomorrow on 11/26/19. Patient will be Per Dr. Torres, the patient is medically ready to be d/c with home care.         CABG    Aspirin               [ x ] Yes  [  ] Contraindicated, Reason_______________________________    Beta-Blocker     [ x ] Yes  [  ]Contraindicated, Reason_______________________________    Statin                 [ x ] Yes  [  ] Contraindicated, Reason_______________________________            Over 35 minutes was spent with the patient reviewing the discharge material including medications, follow up appointments, recovery, concerning symptoms, and how to contact their health care providers if they have questions This is a 58 y/o male with a PMHx of HTN, HLD, obesity, psorasis, cholecystectomy and known CAD (s/p PTCA/RADHA LAD & D1) s/p OPCABx3 (EF 45%) on 11/6/19 and discharged home POD 6 after an uneventful post op course.  On 11/14/2019, the patient presented to [facility] with complaints of severe anxiety and SOB and in the ED they found K>6, BUN/Cr 36/3.22, AST/ALT >5000, Lactate 13 and bleeding was noted from sternotomy incision. The patient was transferred to the CTICU emergently for evaluation and found to have cardiogenic shock with multiorgan failure 2/2 pericardial effusion and tamponade. In the CTICU the sternal incision was opened at the bedside (drained 700cc of fluid) and a wound vac was placed. Patient required 1 episode of emergent hemodialysis due to persistent acidosis/hyperkalemia.  Remainder of his ICU course significant for non oliguric GAUTAM 2/2 ATN with peak Cr of 10, however did not require RRT again.  He was transferred to the stepdown floor on 11/19/2019.  He remained stable on the stepdown floor with slowly resolving BUN/Cr and passed TOV on 11/22/19.  On 11/25/19 renal states the patient is stable from their perspective and will need weekly lab draws to trend BUN/Cr on d/c home. They also recommend he c/w sodium bicarb and calcium acetate upon d/c. Patient also is to be switched from IV to PO abx today for d/c. As per Dr. Naik, pt can be discharged home on 20-day course of Keflex PO. Wound vac was changed today to portable wound vac for discharge. Pt tolerated the procedure well. Wound looked clean, tissue looked healthy and pink and was 11x2.5- majority of the incision was 0.5cm deep with a small area towards the inferior part of the incision about 3cm deep. Wound vac due to be changed Wednseday on 11/27/19.  Per Dr. Torres, the patient is medically ready to be d/c with home care.         Of note: Patient is being discharged with a wound vac. He has been educated on how handle if the wound vac becomes loose with a wet to dry dressing. He will be having home care come to change the vac every 3 days starting on Wednesday 11/27/19. Pt has been given supplies if this happens. Additionally, the pt has been instructed to follow up with his PCP physician regarding his BP management. Finally, the pt has been given a script for weekly lab draws in order to monitor his kidney function.         CABG    Aspirin               [ x ] Yes  [  ] Contraindicated, Reason_______________________________    Beta-Blocker     [ x ] Yes  [  ]Contraindicated, Reason_______________________________    Statin                 [ x ] Yes  [  ] Contraindicated, Reason_______________________________            Over 35 minutes was spent with the patient reviewing the discharge material including medications, follow up appointments, recovery, concerning symptoms, and how to contact their health care providers if they have questions

## 2019-11-25 NOTE — DISCHARGE NOTE NURSING/CASE MANAGEMENT/SOCIAL WORK - NSDCFUADDAPPT_GEN_ALL_CORE_FT
-Please follow up with Dr. Torres on 12/3/19 at 11:15am.  The office is located at [facility], [facility], 4th floor. Call us with any questions #[phone].    -Please follow up with Dr. Reyes (nephrologist/kidney physician) regarding your kidney function. The office is going to call you with your appointment time. If you have any questions please call the office at tel: [phone]. The office is located at [address] , 5th floor.     -Please make an appointment with your cardiologist or your primary care physician to manage your blood pressure medications.

## 2019-11-25 NOTE — DISCHARGE NOTE NURSING/CASE MANAGEMENT/SOCIAL WORK - PATIENT PORTAL LINK FT
You can access the FollowMyHealth Patient Portal offered by [facility] by registering at the following website: [email or web address]. By joining [employer]’s FollowMyHealth portal, you will also be able to view your health information using other applications (apps) compatible with our system.

## 2019-11-25 NOTE — CHART NOTE - NSCHARTNOTEFT_GEN_A_CORE
Admitting Diagnosis:   Patient is a 59y old  Male who presents with a chief complaint of sternal bleeding (24 Nov 2019 10:24)      PAST MEDICAL & SURGICAL HISTORY:  Presence of stent in LAD coronary artery  CAD in native artery  Psoriasis  Obesity  HLD (hyperlipidemia)  HTN (hypertension)  S/P CABG (coronary artery bypass graft)  H/O arthroscopy of knee  S/P cholecystectomy      Current Nutrition Order: DASH/TLC + CstCho + renal        PO Intake: Good (%) [ x  ]  Fair (50-75%) [   ] Poor (<25%) [   ]    GI Issues: no noted n/v/d/c    Pain: no pain noted     Skin Integrity: vac to chest     Labs:   11-25    138  |  102  |  78<H>  ----------------------------<  97  4.1   |  21<L>  |  6.39<H>    Ca    8.5      25 Nov 2019 06:14  Phos  6.0     11-25  Mg     1.8     11-25    TPro  6.1  /  Alb  3.1<L>  /  TBili  0.8  /  DBili  x   /  AST  29  /  ALT  8<L>  /  AlkPhos  140<H>  11-25    CAPILLARY BLOOD GLUCOSE          Medications:  MEDICATIONS  (STANDING):  aspirin enteric coated 81 milliGRAM(s) Oral daily  atorvastatin 10 milliGRAM(s) Oral at bedtime  calcium acetate 667 milliGRAM(s) Oral three times a day with meals  ceFAZolin   IVPB 1000 milliGRAM(s) IV Intermittent every 24 hours  clopidogrel Tablet 75 milliGRAM(s) Oral daily  heparin  Injectable 7500 Unit(s) SubCutaneous every 8 hours  metoprolol tartrate 12.5 milliGRAM(s) Oral every 12 hours  pantoprazole    Tablet 40 milliGRAM(s) Oral before breakfast  sodium bicarbonate 650 milliGRAM(s) Oral three times a day  sodium chloride 0.9% lock flush 3 milliLiter(s) IV Push every 8 hours    MEDICATIONS  (PRN):  oxyCODONE    IR 5 milliGRAM(s) Oral every 6 hours PRN Severe Pain (7 - 10)      Weight:   108.9kg (11/14)  119.7kg (11/15)     Weight Change: +11kg x1 day likely in setting of fluid shifts 2/2 renal dysfunction, please continue to trend     Nutrition Focused Physical Exam: Completed [   ]  Not Pertinent [ x  ]    Estimated energy needs:   Ideal body weight used for calculations as pt >120% of IBW.   .9kg, IBW 61kg, 177% IBW, ht 65", BMI 40   Nutrient needs based on [facility] standards of care for maintenance in adults, adjusted for wound vac/ post-op needs, fluid per team   1525- 1830kcal (25-30kcal/kg)   73-85.4g pro (1.2-1.4g/kg pro)     Subjective:   59M w PMHx of HTN, HLD, obesity, psoriasis, CAD/RADHA, s/p OPCAB x 3 11/6, admitted for cardiogenic shock in the setting of pericardial effusion and tamponade s/p pericardial window and VAC placement on 11/14 his course cb liver shock and non oliguric GAUTAM associated with refractory hyperkalemia and requirement for RRT s/p one HD session 11/15, no further need for HD at this time. Upon exam pt resting in chair NAD, endorses good appetite at this time, continues to wish for greater options, frustrated with fluid restriction as feels he has not been getting any fluid with meals, noted team removed restriction. Would again recommend removal of CstCho diet as A1C 5.1 to hopefully provided greater diet choices and enhance nutritional status, BS have been well managed + renal diet is currently very restrictive. VAC changed 11/24, continues to pend home care set up for management. Continues to tolerate diet well without complaint, sitting up in chair in pleasant spirits.     Previous Nutrition Diagnosis: Increased nutrient needs r/t wound vac/ healing AEB hypermetabolic state     Active [ x  ]  Resolved [   ]    If resolved, new PES:     Goal: meet >75% EER     Recommendations:  1. Reinforce ed prn   2. Manage pain prn   3. Liberalize diet/ remove CstCho and renal restriction   4. Encourage intake through day   5. Trend wts    Education: no further needs at this time     Risk Level: High [   ] Moderate [ x  ] Low [   ]

## 2019-11-25 NOTE — PROGRESS NOTE ADULT - SUBJECTIVE AND OBJECTIVE BOX
Patient discussed on morning rounds with Dr. Torres     Readmission: 11/14/2019 - Drainage of pericardial effusion/sternal wound locally opened with wound VAC placement   Operation / Date: 11/6/2019 - OPCABx3 (EF 45%)    Surgeon: Dr. Torres     Referring Physician: n/a - [facility] COURSE:  This is a 58 y/o male with a PMHx of HTN, HLD, obesity, psorasis, cholecystectomy and known CAD (s/p PTCA/RADHA LAD & D1) s/p OPCABx3 (EF 45%) on 11/6/19 and discharged home POD 6 after an uneventful post op course.  On 11/14/2019, the patient presented to [facility] with complaints of severe anxiety and SOB and in the ED they found K>6, BUN/Cr 36/3.22, AST/ALT >5000, Lactate 13 and bleeding was noted from sternotomy incision. The patient was transferred to the CTICU emergently for evaluation and found to have cardiogenic shock with multiorgan failure 2/2 pericardial effusion and tamponade. In the CTICU the sternal incision was opened at the bedside (drained 700cc of fluid) and a wound vac was placed. Patient required 1 episode of emergent hemodialysis due to persistent acidosis/hyperkalemia.  Remainder of his ICU course significant for non oliguric GAUTAM 2/2 ATN with peak Cr of 10, however did not require RRT again.  He was transferred to the stepdown floor on 11/19/2019.  He remained stable on the stepdown floor with slowly resolving BUN/Cr and passed TOV on 11/22/19.  On 11/25/19 renal states the patient is stable from their perspective and will need weekly lab draws to trend BUN/Cr once d/c home. They also recommend he c/w sodium bicarb and calcium acetate upon d/c. Patient also is to be switched from IV to PO abx today in anticipation for d/c. Wound vac is still in place and was last changed yesterday and due to be changed tomorrow on 11/26/19. Patient will be Per Dr. Torres, the patient is medically ready to be d/c with home care.     Of note: Patient is being discharged with a wound vac. He has been educated on how handle if the wound vac becomes loose. He will be having home care come to change the vac every 3 days starting on Wednesday 11/26/19. Pt has been given supplies if this happens. Additionally, the pt has been instructed to follow up with his PCP physician regarding his BP management. Finally, the pt has been given a script for weekly lab draws in order to monitor his kidney function.     SUBJECTIVE ASSESSMENT:  Patient is feeling well this morning and is anxious to get home. He has been ambulating without difficulty and using his IS regularly. He denies any CP, SOB, wheezing, abdominal pain, N/V/D/C, fevers or chills.     Vital Signs Last 24 Hrs  T(C): 37.2 (25 Nov 2019 14:10), Max: 37.5 (25 Nov 2019 05:34)  T(F): 98.9 (25 Nov 2019 14:10), Max: 99.5 (25 Nov 2019 05:34)  HR: 96 (25 Nov 2019 09:05) (92 - 100)  BP: 162/82 (25 Nov 2019 09:05) (159/89 - 181/103)  BP(mean): 123 (25 Nov 2019 09:05) (115 - 138)  RR: 17 (25 Nov 2019 09:05) (16 - 20)  SpO2: 98% (25 Nov 2019 09:05) (95% - 99%)    EPICARDIAL WIRES REMOVED: Yes  TIE DOWNS REMOVED: Yes    PHYSICAL EXAM:    General: well-appearing sitting up in chair at the bedside in NAD  Neurological: AOx3. Motor skills are grossly intact.   Cardiovascular: Normal S1/S2. RRR. No M/R/G.   Respiratory: Lungs CTA b/l. No W/R/R  Gastrointestinal: +BS 4 quadrants. Soft. NT. ND  Extremities: Strength 5/5 b/l upper/lower extremities.   Vascular: Radial 2+ b/l. DP 2+ b/l.   Incision Sites: Sternotomy incision with wound vac in place without erythema, purulence, ecchymosis. L radial incisions healing well without erythema, pururlence, or ecchymosis.     LABS:                        8.4    11.85 )-----------( 270      ( 25 Nov 2019 06:14 )             26.3       COUMADIN: No    PT/INR - ( 24 Nov 2019 06:48 )   PT: 12.7 sec;   INR: 1.12        PTT - ( 24 Nov 2019 06:48 )  PTT:36.5 sec      11-25    138  |  102  |  78<H>  ----------------------------<  97  4.1   |  21<L>  |  6.39<H>    Ca    8.5      25 Nov 2019 06:14  Phos  6.0     11-25  Mg     1.8     11-25    TPro  6.1  /  Alb  3.1<L>  /  TBili  0.8  /  DBili  x   /  AST  29  /  ALT  8<L>  /  AlkPhos  140<H>  11-25          Discharge CXR:  < from: Xray Chest 2 Views PA/Lat (11.25.19 @ 09:05) >    Findings/  impression: Stable cardiomegaly status post median sternotomy. Stable   lung pathology.. Right azygos lobe developmental variant.. Stable bony   structures..    < end of copied text >      Discharge ECHO:  < from: Echocardiogram (11.18.19 @ 16:12) >  CONCLUSIONS:     1. Limited study.   2. Left ventricular ejection fraction is 50-55 %. Abnormal septal motion   likely secondary to abnormal conduction.   3. No pericardial effusion.    < end of copied text >

## 2019-11-25 NOTE — DISCHARGE NOTE PROVIDER - NSDCMRMEDTOKEN_GEN_ALL_CORE_FT
acetaminophen 325 mg oral tablet: 1 tab(s) orally every 6 hours, As Needed -Mild Pain (1 - 3) MDD:8 tabs  aspirin 81 mg oral delayed release tablet: 1 tab(s) orally once a day  Cardizem 120 mg oral tablet: 1 tab(s) orally once a day   clopidogrel 75 mg oral tablet: 1 tab(s) orally once a day  famotidine 20 mg oral tablet: 1 tab(s) orally 2 times a day  Lasix 20 mg oral tablet: 1 tab(s) orally once a day   metoprolol tartrate 75 mg oral tablet: 1 tab(s) orally 2 times a day   oxycodone-acetaminophen 5 mg-325 mg oral tablet: 1 tab(s) orally every 6 hours, As Needed -Severe Pain (7 - 10) MDD:4 tablets  polyethylene glycol 3350 oral powder for reconstitution: 17 gram(s) orally once a day, As needed, Constipation  potassium chloride 10 mEq oral tablet, extended release: 1 tab(s) orally once a day   rosuvastatin 40 mg oral tablet: 1 tab(s) orally once a day aspirin 81 mg oral delayed release tablet: 1 tab(s) orally once a day  atorvastatin 10 mg oral tablet: 1 tab(s) orally once a day (at bedtime)  calcium acetate 667 mg oral tablet: 1 tab(s) orally 3 times a day   clopidogrel 75 mg oral tablet: 1 tab(s) orally once a day  Keflex 250 mg oral capsule: 1 cap(s) orally once a day   metoprolol tartrate 25 mg oral tablet: 1 tab(s) orally 2 times a day  oxyCODONE 5 mg oral tablet: 1 tab(s) orally every 6 hours, As needed, Severe Pain (7 - 10) MDD:4 tablets  pantoprazole 40 mg oral delayed release tablet: 1 tab(s) orally once a day (before a meal)  sodium bicarbonate 650 mg oral tablet: 1 tab(s) orally 3 times a day  Weekly lab draws: BMP, Mg, Phosphorus level : Please draw weekly labs: BMP, Mg, Phosphorous level

## 2019-11-25 NOTE — PROGRESS NOTE ADULT - PROBLEM SELECTOR PLAN 1
non-oliguric GAUTAM with downtrending Scret and excellent UOP  no clinical signs of uremia  continue to trend daily Scret  no indications for hemodialysis at this time  still hypertensive, could consider to titrate up lopressor and resume home Cardizem, probably lower dose  - cw phoslo 667 mg TID w/meals and Nabicarb 650 mg TID   Will follow up as outpatient with Dr. Reyes

## 2019-11-26 ENCOUNTER — INBOUND DOCUMENT (OUTPATIENT)
Age: 59
End: 2019-11-26

## 2019-11-27 DIAGNOSIS — N17.0 ACUTE KIDNEY FAILURE WITH TUBULAR NECROSIS: ICD-10-CM

## 2019-11-27 DIAGNOSIS — L76.22 POSTPROCEDURAL HEMORRHAGE OF SKIN AND SUBCUTANEOUS TISSUE FOLLOWING OTHER PROCEDURE: ICD-10-CM

## 2019-11-27 DIAGNOSIS — K72.00 ACUTE AND SUBACUTE HEPATIC FAILURE WITHOUT COMA: ICD-10-CM

## 2019-11-27 DIAGNOSIS — E87.5 HYPERKALEMIA: ICD-10-CM

## 2019-11-27 DIAGNOSIS — I25.10 ATHEROSCLEROTIC HEART DISEASE OF NATIVE CORONARY ARTERY WITHOUT ANGINA PECTORIS: ICD-10-CM

## 2019-11-27 DIAGNOSIS — J96.00 ACUTE RESPIRATORY FAILURE, UNSPECIFIED WHETHER WITH HYPOXIA OR HYPERCAPNIA: ICD-10-CM

## 2019-11-27 DIAGNOSIS — E78.5 HYPERLIPIDEMIA, UNSPECIFIED: ICD-10-CM

## 2019-11-27 DIAGNOSIS — I10 ESSENTIAL (PRIMARY) HYPERTENSION: ICD-10-CM

## 2019-11-27 DIAGNOSIS — Z90.49 ACQUIRED ABSENCE OF OTHER SPECIFIED PARTS OF DIGESTIVE TRACT: ICD-10-CM

## 2019-11-27 DIAGNOSIS — I31.4 CARDIAC TAMPONADE: ICD-10-CM

## 2019-11-27 DIAGNOSIS — J93.9 PNEUMOTHORAX, UNSPECIFIED: ICD-10-CM

## 2019-11-27 DIAGNOSIS — L40.9 PSORIASIS, UNSPECIFIED: ICD-10-CM

## 2019-11-27 DIAGNOSIS — E87.1 HYPO-OSMOLALITY AND HYPONATREMIA: ICD-10-CM

## 2019-11-27 DIAGNOSIS — I31.3 PERICARDIAL EFFUSION (NONINFLAMMATORY): ICD-10-CM

## 2019-11-27 DIAGNOSIS — I25.3 ANEURYSM OF HEART: ICD-10-CM

## 2019-11-27 DIAGNOSIS — Z95.5 PRESENCE OF CORONARY ANGIOPLASTY IMPLANT AND GRAFT: ICD-10-CM
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
## 2019-11-27 DIAGNOSIS — E66.9 OBESITY, UNSPECIFIED: ICD-10-CM

## 2019-11-27 DIAGNOSIS — E87.2 ACIDOSIS: ICD-10-CM

## 2019-11-27 DIAGNOSIS — Z95.1 PRESENCE OF AORTOCORONARY BYPASS GRAFT: ICD-10-CM

## 2019-11-27 DIAGNOSIS — R57.0 CARDIOGENIC SHOCK: ICD-10-CM

## 2019-11-27 PROBLEM — T81.32XA STERNAL WOUND DEHISCENCE: Status: ACTIVE | Noted: 2019-11-27

## 2019-11-27 PROBLEM — Z86.39 HISTORY OF OBESITY: Status: RESOLVED | Noted: 2019-11-27 | Resolved: 2019-11-27

## 2019-11-27 PROBLEM — N17.9 ACUTE KIDNEY INJURY: Status: ACTIVE | Noted: 2019-11-27

## 2019-11-27 PROBLEM — R06.09 DYSPNEA ON EXERTION: Status: RESOLVED | Noted: 2019-10-07 | Resolved: 2019-11-27

## 2019-11-27 PROBLEM — Z86.79 HISTORY OF ABNORMAL ELECTROCARDIOGRAPHY: Status: RESOLVED | Noted: 2019-06-10 | Resolved: 2019-11-27

## 2019-11-27 PROBLEM — Z86.79 HISTORY OF CORONARY ARTERY DISEASE: Status: RESOLVED | Noted: 2019-06-10 | Resolved: 2019-11-27

## 2019-11-27 PROBLEM — Z86.79 HISTORY OF ESSENTIAL HYPERTENSION: Status: RESOLVED | Noted: 2019-06-10 | Resolved: 2019-11-27

## 2019-11-27 RX ORDER — METOPROLOL TARTRATE 75 MG/1
75 TABLET, FILM COATED ORAL
Qty: 60 | Refills: 3 | Status: COMPLETED | COMMUNITY
End: 2019-11-27

## 2019-11-27 RX ORDER — DILTIAZEM HYDROCHLORIDE 120 MG/1
120 CAPSULE, COATED, EXTENDED RELEASE ORAL DAILY
Refills: 0 | Status: COMPLETED | COMMUNITY
End: 2019-11-27

## 2019-11-27 RX ORDER — FAMOTIDINE 20 MG/1
20 TABLET, FILM COATED ORAL TWICE DAILY
Qty: 60 | Refills: 6 | Status: COMPLETED | COMMUNITY
End: 2019-11-27

## 2019-11-27 RX ORDER — ROSUVASTATIN CALCIUM 40 MG/1
40 TABLET, FILM COATED ORAL DAILY
Refills: 0 | Status: COMPLETED | COMMUNITY
End: 2019-11-27

## 2019-11-27 RX ORDER — FUROSEMIDE 20 MG/1
20 TABLET ORAL DAILY
Qty: 30 | Refills: 0 | Status: COMPLETED | COMMUNITY
End: 2019-11-27

## 2019-11-27 RX ORDER — OXYCODONE HYDROCHLORIDE AND ACETAMINOPHEN 5; 325 MG/1; MG/1
5-325 TABLET ORAL
Refills: 0 | Status: COMPLETED | COMMUNITY
End: 2019-11-27

## 2019-12-02 ENCOUNTER — FORM ENCOUNTER (OUTPATIENT)
Age: 59
End: 2019-12-02

## 2019-12-03 ENCOUNTER — OUTPATIENT (OUTPATIENT)
Dept: OUTPATIENT SERVICES | Facility: HOSPITAL | Age: 59
LOS: 1 days | End: 2019-12-03
Payer: COMMERCIAL

## 2019-12-03 ENCOUNTER — APPOINTMENT (OUTPATIENT)
Dept: CARDIOTHORACIC SURGERY | Facility: CLINIC | Age: 59
End: 2019-12-03
Payer: COMMERCIAL

## 2019-12-03 VITALS
TEMPERATURE: 96.6 F | RESPIRATION RATE: 18 BRPM | HEART RATE: 83 BPM | BODY MASS INDEX: 36.82 KG/M2 | OXYGEN SATURATION: 98 % | DIASTOLIC BLOOD PRESSURE: 93 MMHG | WEIGHT: 221 LBS | HEIGHT: 65 IN | SYSTOLIC BLOOD PRESSURE: 159 MMHG

## 2019-12-03 DIAGNOSIS — Z86.39 PERSONAL HISTORY OF OTHER ENDOCRINE, NUTRITIONAL AND METABOLIC DISEASE: ICD-10-CM

## 2019-12-03 DIAGNOSIS — I31.3 PERICARDIAL EFFUSION (NONINFLAMMATORY): ICD-10-CM

## 2019-12-03 DIAGNOSIS — Z86.79 PERSONAL HISTORY OF OTHER DISEASES OF THE CIRCULATORY SYSTEM: ICD-10-CM

## 2019-12-03 DIAGNOSIS — Z95.1 PRESENCE OF AORTOCORONARY BYPASS GRAFT: Chronic | ICD-10-CM

## 2019-12-03 DIAGNOSIS — N17.9 ACUTE KIDNEY FAILURE, UNSPECIFIED: ICD-10-CM

## 2019-12-03 DIAGNOSIS — T81.32XA DISRUPTION OF INTERNAL OPERATION (SURGICAL) WOUND, NOT ELSEWHERE CLASSIFIED, INITIAL ENCOUNTER: ICD-10-CM

## 2019-12-03 DIAGNOSIS — Z98.890 OTHER SPECIFIED POSTPROCEDURAL STATES: Chronic | ICD-10-CM

## 2019-12-03 DIAGNOSIS — R06.09 OTHER FORMS OF DYSPNEA: ICD-10-CM

## 2019-12-03 DIAGNOSIS — Z90.49 ACQUIRED ABSENCE OF OTHER SPECIFIED PARTS OF DIGESTIVE TRACT: Chronic | ICD-10-CM

## 2019-12-03 DIAGNOSIS — E78.5 HYPERLIPIDEMIA, UNSPECIFIED: ICD-10-CM

## 2019-12-03 LAB
ALBUMIN SERPL ELPH-MCNC: 4 G/DL — SIGNIFICANT CHANGE UP (ref 3.3–5)
ALP SERPL-CCNC: 144 U/L — HIGH (ref 40–120)
ALT FLD-CCNC: 34 U/L — SIGNIFICANT CHANGE UP (ref 10–45)
ANION GAP SERPL CALC-SCNC: 14 MMOL/L — SIGNIFICANT CHANGE UP (ref 5–17)
AST SERPL-CCNC: 30 U/L — SIGNIFICANT CHANGE UP (ref 10–40)
BASOPHILS # BLD AUTO: 0.08 K/UL — SIGNIFICANT CHANGE UP (ref 0–0.2)
BASOPHILS NFR BLD AUTO: 0.8 % — SIGNIFICANT CHANGE UP (ref 0–2)
BILIRUB SERPL-MCNC: 1 MG/DL — SIGNIFICANT CHANGE UP (ref 0.2–1.2)
BUN SERPL-MCNC: 37 MG/DL — HIGH (ref 7–23)
CALCIUM SERPL-MCNC: 9 MG/DL — SIGNIFICANT CHANGE UP (ref 8.4–10.5)
CHLORIDE SERPL-SCNC: 106 MMOL/L — SIGNIFICANT CHANGE UP (ref 96–108)
CO2 SERPL-SCNC: 23 MMOL/L — SIGNIFICANT CHANGE UP (ref 22–31)
CREAT SERPL-MCNC: 1.74 MG/DL — HIGH (ref 0.5–1.3)
EOSINOPHIL # BLD AUTO: 1.21 K/UL — HIGH (ref 0–0.5)
EOSINOPHIL NFR BLD AUTO: 12.6 % — HIGH (ref 0–6)
GLUCOSE SERPL-MCNC: 85 MG/DL — SIGNIFICANT CHANGE UP (ref 70–99)
HCT VFR BLD CALC: 35.4 % — LOW (ref 39–50)
HGB BLD-MCNC: 11.3 G/DL — LOW (ref 13–17)
IMM GRANULOCYTES NFR BLD AUTO: 1.3 % — SIGNIFICANT CHANGE UP (ref 0–1.5)
LYMPHOCYTES # BLD AUTO: 2.07 K/UL — SIGNIFICANT CHANGE UP (ref 1–3.3)
LYMPHOCYTES # BLD AUTO: 21.5 % — SIGNIFICANT CHANGE UP (ref 13–44)
MCHC RBC-ENTMCNC: 29.7 PG — SIGNIFICANT CHANGE UP (ref 27–34)
MCHC RBC-ENTMCNC: 31.9 GM/DL — LOW (ref 32–36)
MCV RBC AUTO: 93.2 FL — SIGNIFICANT CHANGE UP (ref 80–100)
MONOCYTES # BLD AUTO: 0.89 K/UL — SIGNIFICANT CHANGE UP (ref 0–0.9)
MONOCYTES NFR BLD AUTO: 9.2 % — SIGNIFICANT CHANGE UP (ref 2–14)
NEUTROPHILS # BLD AUTO: 5.26 K/UL — SIGNIFICANT CHANGE UP (ref 1.8–7.4)
NEUTROPHILS NFR BLD AUTO: 54.6 % — SIGNIFICANT CHANGE UP (ref 43–77)
NRBC # BLD: 0 /100 WBCS — SIGNIFICANT CHANGE UP (ref 0–0)
PLATELET # BLD AUTO: 388 K/UL — SIGNIFICANT CHANGE UP (ref 150–400)
POTASSIUM SERPL-MCNC: 3.7 MMOL/L — SIGNIFICANT CHANGE UP (ref 3.5–5.3)
POTASSIUM SERPL-SCNC: 3.7 MMOL/L — SIGNIFICANT CHANGE UP (ref 3.5–5.3)
PROT SERPL-MCNC: 8.1 G/DL — SIGNIFICANT CHANGE UP (ref 6–8.3)
RBC # BLD: 3.8 M/UL — LOW (ref 4.2–5.8)
RBC # FLD: 13.9 % — SIGNIFICANT CHANGE UP (ref 10.3–14.5)
SODIUM SERPL-SCNC: 143 MMOL/L — SIGNIFICANT CHANGE UP (ref 135–145)
WBC # BLD: 9.64 K/UL — SIGNIFICANT CHANGE UP (ref 3.8–10.5)
WBC # FLD AUTO: 9.64 K/UL — SIGNIFICANT CHANGE UP (ref 3.8–10.5)

## 2019-12-03 PROCEDURE — 36415 COLL VENOUS BLD VENIPUNCTURE: CPT

## 2019-12-03 PROCEDURE — 71046 X-RAY EXAM CHEST 2 VIEWS: CPT | Mod: 26

## 2019-12-03 PROCEDURE — 85025 COMPLETE CBC W/AUTO DIFF WBC: CPT

## 2019-12-03 PROCEDURE — 99024 POSTOP FOLLOW-UP VISIT: CPT

## 2019-12-03 PROCEDURE — 80053 COMPREHEN METABOLIC PANEL: CPT

## 2019-12-03 PROCEDURE — 71046 X-RAY EXAM CHEST 2 VIEWS: CPT

## 2019-12-17 ENCOUNTER — APPOINTMENT (OUTPATIENT)
Dept: CARDIOTHORACIC SURGERY | Facility: CLINIC | Age: 59
End: 2019-12-17
Payer: COMMERCIAL

## 2019-12-17 VITALS
WEIGHT: 220 LBS | SYSTOLIC BLOOD PRESSURE: 156 MMHG | HEIGHT: 65 IN | DIASTOLIC BLOOD PRESSURE: 97 MMHG | RESPIRATION RATE: 18 BRPM | BODY MASS INDEX: 36.65 KG/M2 | OXYGEN SATURATION: 98 % | TEMPERATURE: 96.4 F | HEART RATE: 83 BPM

## 2019-12-17 PROCEDURE — 99024 POSTOP FOLLOW-UP VISIT: CPT

## 2019-12-26 ENCOUNTER — APPOINTMENT (OUTPATIENT)
Dept: HEART AND VASCULAR | Facility: CLINIC | Age: 59
End: 2019-12-26
Payer: COMMERCIAL

## 2019-12-26 VITALS
BODY MASS INDEX: 37.65 KG/M2 | OXYGEN SATURATION: 97 % | DIASTOLIC BLOOD PRESSURE: 80 MMHG | RESPIRATION RATE: 17 BRPM | HEART RATE: 81 BPM | HEIGHT: 65 IN | WEIGHT: 226 LBS | SYSTOLIC BLOOD PRESSURE: 150 MMHG

## 2019-12-26 PROCEDURE — 99215 OFFICE O/P EST HI 40 MIN: CPT

## 2019-12-26 RX ORDER — PANTOPRAZOLE 40 MG/1
40 TABLET, DELAYED RELEASE ORAL DAILY
Refills: 5 | Status: DISCONTINUED | COMMUNITY
End: 2019-12-26

## 2019-12-26 RX ORDER — METOPROLOL TARTRATE 25 MG/1
25 TABLET, FILM COATED ORAL
Refills: 0 | Status: DISCONTINUED | COMMUNITY
End: 2019-12-26

## 2019-12-26 RX ORDER — CEPHALEXIN 250 MG/1
250 CAPSULE ORAL DAILY
Refills: 0 | Status: DISCONTINUED | COMMUNITY
End: 2019-12-26

## 2019-12-26 NOTE — HISTORY OF PRESENT ILLNESS
[FreeTextEntry1] : Esteban Marino returns for follow up.  Since his last visit with me on October 2019: \par \par CTA 10/17/2019: severe LAD/CX disease; mild RCA disease\par Cardiac Cath 10/31/2019: severe LAD with AV fistula; severe ostial D1; severe CX ISR; severe RPL (dominant RCA)\par Off pump FABRICIO 11/6/2019: LIMA to D1 (side to side) and to LAD (end to side); L radial to OM1.\par \par \par Post surgery discharge, he was readmitted with pericardial effusion/tamponade, cardiogenic shock, multiorgan faliure (GAUTAM/liver).  Emergent surgical pericardial fluid drained, hemodialysis performed and ICU stay.  Gradual improvement in renal function and liver function noted.  IV/oral antibiotics given (completed course).  Drains removed.\par \par Today, he denies exertional cp.  He is riding stationary bike at home.  He denies pnd, orthopnea, edema, palp, or loc.\par \par Recommendations:\par \par Renal follow up.\par Surgical follow up.\par Primary care follow up.\par Cardiac rehab referral.\par change/inc beta blocker to metoprolol succinate 50 mg daily \par blood work done today

## 2019-12-26 NOTE — REASON FOR VISIT
[Follow-Up - Clinic] : a clinic follow-up of [Coronary Artery Disease] : coronary artery disease [Hyperlipidemia] : hyperlipidemia [Hypertension] : hypertension none

## 2019-12-26 NOTE — PHYSICAL EXAM
[Well Groomed] : well groomed [General Appearance - Well Developed] : well developed [Normal Appearance] : normal appearance [General Appearance - Well Nourished] : well nourished [General Appearance - In No Acute Distress] : no acute distress [No Deformities] : no deformities [Normal Conjunctiva] : the conjunctiva exhibited no abnormalities [Eyelids - No Xanthelasma] : the eyelids demonstrated no xanthelasmas [No Oral Cyanosis] : no oral cyanosis [No Oral Pallor] : no oral pallor [Normal Oral Mucosa] : normal oral mucosa [Normal Jugular Venous A Waves Present] : normal jugular venous A waves present [Normal Jugular Venous V Waves Present] : normal jugular venous V waves present [No Jugular Venous Reddy A Waves] : no jugular venous reddy A waves [Auscultation Breath Sounds / Voice Sounds] : lungs were clear to auscultation bilaterally [Exaggerated Use Of Accessory Muscles For Inspiration] : no accessory muscle use [Respiration, Rhythm And Depth] : normal respiratory rhythm and effort [Heart Sounds] : normal S1 and S2 [Murmurs] : no murmurs present [Heart Rate And Rhythm] : heart rate and rhythm were normal [Abdomen Soft] : soft [Abdomen Tenderness] : non-tender [Abnormal Walk] : normal gait [Gait - Sufficient For Exercise Testing] : the gait was sufficient for exercise testing [Abdomen Mass (___ Cm)] : no abdominal mass palpated [Nail Clubbing] : no clubbing of the fingernails [Cyanosis, Localized] : no localized cyanosis [Skin Color & Pigmentation] : normal skin color and pigmentation [Petechial Hemorrhages (___cm)] : no petechial hemorrhages [Skin Lesions] : no skin lesions [No Venous Stasis] : no venous stasis [] : no rash [No Skin Ulcers] : no skin ulcer [No Xanthoma] : no  xanthoma was observed

## 2019-12-26 NOTE — DISCUSSION/SUMMARY
[Coronary Artery Disease] : coronary artery disease [Possible Cardiac Ischemia (Intermd Prob)] : possible cardiac ischemia (intermediate probability) [Stable] : stable [Improving] : improving [Weight Reduction] : weight reduction [Dietary Modification] : dietary modification [Hyperlipidemia] : hyperlipidemia [Diet Modification] : diet modification [None] : none [Exercise] : exercise [Hypertension] : hypertension [Medication Changes Per Orders] : as documented in orders [Deteriorating] : deteriorating [Weight Loss] : weight loss [Exercise Regimen] : an exercise regimen [Patient] : the patient [Sodium Restriction] : sodium restriction [de-identified] : prior hx of PCI of CX; s/p off pump FABRICIO with LIMA to LAD/D1 abd L radial to OM1

## 2019-12-27 ENCOUNTER — RX RENEWAL (OUTPATIENT)
Age: 59
End: 2019-12-27

## 2019-12-27 LAB
ALBUMIN SERPL ELPH-MCNC: 3.7 G/DL
ALP BLD-CCNC: 118 U/L
ALT SERPL-CCNC: 27 U/L
ANION GAP SERPL CALC-SCNC: 13 MMOL/L
AST SERPL-CCNC: 24 U/L
BASOPHILS # BLD AUTO: 0.09 K/UL
BASOPHILS NFR BLD AUTO: 0.8 %
BILIRUB SERPL-MCNC: 0.6 MG/DL
BUN SERPL-MCNC: 18 MG/DL
CALCIUM SERPL-MCNC: 9.5 MG/DL
CHLORIDE SERPL-SCNC: 104 MMOL/L
CHOLEST SERPL-MCNC: 152 MG/DL
CHOLEST/HDLC SERPL: 3.6 RATIO
CO2 SERPL-SCNC: 23 MMOL/L
CREAT SERPL-MCNC: 1.04 MG/DL
EOSINOPHIL # BLD AUTO: 0.5 K/UL
EOSINOPHIL NFR BLD AUTO: 4.6 %
GLUCOSE SERPL-MCNC: 93 MG/DL
HCT VFR BLD CALC: 35.6 %
HDLC SERPL-MCNC: 42 MG/DL
HGB BLD-MCNC: 11.6 G/DL
IMM GRANULOCYTES NFR BLD AUTO: 1.7 %
LDLC SERPL CALC-MCNC: 92 MG/DL
LYMPHOCYTES # BLD AUTO: 1.93 K/UL
LYMPHOCYTES NFR BLD AUTO: 17.7 %
MAN DIFF?: NORMAL
MCHC RBC-ENTMCNC: 29.9 PG
MCHC RBC-ENTMCNC: 32.6 GM/DL
MCV RBC AUTO: 91.8 FL
MONOCYTES # BLD AUTO: 0.76 K/UL
MONOCYTES NFR BLD AUTO: 7 %
NEUTROPHILS # BLD AUTO: 7.41 K/UL
NEUTROPHILS NFR BLD AUTO: 68.2 %
PLATELET # BLD AUTO: 288 K/UL
POTASSIUM SERPL-SCNC: 3.2 MMOL/L
PROT SERPL-MCNC: 7.5 G/DL
RBC # BLD: 3.88 M/UL
RBC # FLD: 14.4 %
SODIUM SERPL-SCNC: 140 MMOL/L
TRIGL SERPL-MCNC: 88 MG/DL
WBC # FLD AUTO: 10.88 K/UL

## 2020-01-14 ENCOUNTER — APPOINTMENT (OUTPATIENT)
Dept: CARDIOTHORACIC SURGERY | Facility: CLINIC | Age: 60
End: 2020-01-14
Payer: COMMERCIAL

## 2020-01-14 VITALS
HEART RATE: 92 BPM | BODY MASS INDEX: 37.15 KG/M2 | WEIGHT: 223 LBS | RESPIRATION RATE: 18 BRPM | OXYGEN SATURATION: 99 % | HEIGHT: 65 IN | DIASTOLIC BLOOD PRESSURE: 83 MMHG | SYSTOLIC BLOOD PRESSURE: 157 MMHG | TEMPERATURE: 96.6 F

## 2020-01-14 PROCEDURE — 99024 POSTOP FOLLOW-UP VISIT: CPT

## 2020-02-25 ENCOUNTER — NON-APPOINTMENT (OUTPATIENT)
Age: 60
End: 2020-02-25

## 2020-02-25 ENCOUNTER — APPOINTMENT (OUTPATIENT)
Dept: HEART AND VASCULAR | Facility: CLINIC | Age: 60
End: 2020-02-25
Payer: COMMERCIAL

## 2020-02-25 VITALS
HEART RATE: 76 BPM | DIASTOLIC BLOOD PRESSURE: 88 MMHG | RESPIRATION RATE: 16 BRPM | SYSTOLIC BLOOD PRESSURE: 152 MMHG | HEIGHT: 65 IN | BODY MASS INDEX: 38.49 KG/M2 | WEIGHT: 231 LBS

## 2020-02-25 PROCEDURE — 99215 OFFICE O/P EST HI 40 MIN: CPT

## 2020-02-25 RX ORDER — POTASSIUM CHLORIDE 1500 MG/1
20 TABLET, FILM COATED, EXTENDED RELEASE ORAL AS DIRECTED
Qty: 3 | Refills: 0 | Status: DISCONTINUED | COMMUNITY
Start: 2019-12-27 | End: 2020-02-25

## 2020-02-25 RX ORDER — SODIUM BICARBONATE 650 MG/1
650 TABLET ORAL
Refills: 0 | Status: DISCONTINUED | COMMUNITY
End: 2020-02-25

## 2020-02-25 RX ORDER — CALCIUM ACETATE 667 MG
667 TABLET ORAL 3 TIMES DAILY
Refills: 0 | Status: DISCONTINUED | COMMUNITY
End: 2020-02-25

## 2020-02-26 NOTE — PHYSICAL EXAM
[General Appearance - Well Developed] : well developed [Normal Appearance] : normal appearance [Well Groomed] : well groomed [General Appearance - Well Nourished] : well nourished [No Deformities] : no deformities [General Appearance - In No Acute Distress] : no acute distress [Normal Conjunctiva] : the conjunctiva exhibited no abnormalities [Eyelids - No Xanthelasma] : the eyelids demonstrated no xanthelasmas [No Oral Pallor] : no oral pallor [Normal Oral Mucosa] : normal oral mucosa [Normal Jugular Venous A Waves Present] : normal jugular venous A waves present [Normal Jugular Venous V Waves Present] : normal jugular venous V waves present [No Oral Cyanosis] : no oral cyanosis [No Jugular Venous Reddy A Waves] : no jugular venous reddy A waves [Respiration, Rhythm And Depth] : normal respiratory rhythm and effort [Auscultation Breath Sounds / Voice Sounds] : lungs were clear to auscultation bilaterally [Exaggerated Use Of Accessory Muscles For Inspiration] : no accessory muscle use [Heart Sounds] : normal S1 and S2 [Murmurs] : no murmurs present [Heart Rate And Rhythm] : heart rate and rhythm were normal [Abdomen Soft] : soft [Abdomen Tenderness] : non-tender [Abdomen Mass (___ Cm)] : no abdominal mass palpated [Abnormal Walk] : normal gait [Nail Clubbing] : no clubbing of the fingernails [Cyanosis, Localized] : no localized cyanosis [Gait - Sufficient For Exercise Testing] : the gait was sufficient for exercise testing [Petechial Hemorrhages (___cm)] : no petechial hemorrhages [Skin Color & Pigmentation] : normal skin color and pigmentation [No Venous Stasis] : no venous stasis [] : no rash [Skin Lesions] : no skin lesions [No Skin Ulcers] : no skin ulcer [No Xanthoma] : no  xanthoma was observed

## 2020-02-26 NOTE — HISTORY OF PRESENT ILLNESS
[FreeTextEntry1] : Esteban Marino returns for follow up.  Today, he denies exertional cp and sob.  He denies pnd, orthopnea, edema, palp, or loc.\par \par CTA 10/17/2019: severe LAD/CX disease; mild RCA disease\par Cardiac Cath 10/31/2019: severe LAD with AV fistula; severe ostial D1; severe CX ISR; severe RPL (dominant RCA)\par Off pump FABRICIO 11/6/2019: LIMA to D1 (side to side) and to LAD (end to side); L radial to OM1.\par \par Post op complications noted in visit note from 12/26/2019.\par \par He is active and compliant with meds.\par \par Recommendations:\par \par Renal follow up - completed.\par Surgical follow up - completed.\par Primary care follow up - scheduled\par Cardiac rehab referral - to start next week\par consider increase in ACE inh dose\par follow up in 3-4 months

## 2020-02-26 NOTE — DISCUSSION/SUMMARY
[Coronary Artery Disease] : coronary artery disease [Possible Cardiac Ischemia (Intermd Prob)] : possible cardiac ischemia (intermediate probability) [Improving] : improving [Dietary Modification] : dietary modification [Weight Reduction] : weight reduction [Hyperlipidemia] : hyperlipidemia [Exercise] : exercise [Diet Modification] : diet modification [Stable] : stable [Hypertension] : hypertension [None] : none [Exercise Regimen] : an exercise regimen [Weight Loss] : weight loss [de-identified] : prior hx of PCI of CX; s/p off pump FABRICIO with LIMA to LAD/D1 abd L radial to OM1 [Patient] : the patient [Sodium Restriction] : sodium restriction [de-identified] : t/c additional meds

## 2020-03-03 ENCOUNTER — APPOINTMENT (OUTPATIENT)
Dept: HEART AND VASCULAR | Facility: CLINIC | Age: 60
End: 2020-03-03
Payer: COMMERCIAL

## 2020-03-03 ENCOUNTER — NON-APPOINTMENT (OUTPATIENT)
Age: 60
End: 2020-03-03

## 2020-03-03 VITALS
BODY MASS INDEX: 38.65 KG/M2 | HEIGHT: 65 IN | SYSTOLIC BLOOD PRESSURE: 98 MMHG | RESPIRATION RATE: 12 BRPM | WEIGHT: 232 LBS | DIASTOLIC BLOOD PRESSURE: 72 MMHG | HEART RATE: 84 BPM

## 2020-03-03 PROCEDURE — 36415 COLL VENOUS BLD VENIPUNCTURE: CPT

## 2020-04-28 ENCOUNTER — APPOINTMENT (OUTPATIENT)
Dept: HEART AND VASCULAR | Facility: CLINIC | Age: 60
End: 2020-04-28
Payer: COMMERCIAL

## 2020-04-28 PROCEDURE — 99214 OFFICE O/P EST MOD 30 MIN: CPT | Mod: 95

## 2020-04-28 NOTE — HISTORY OF PRESENT ILLNESS
[Verbal consent obtained from patient] : the patient, [unfilled] [Time Spent: ___ minutes] : I have spent [unfilled] minutes with the patient on the telephone [FreeTextEntry1] : Esteban Marino requested televideo follow up.  Consent obtained and recorded.  He was at home.  Doctor was at [address] [place] [address].\par \par Mr Marino denies cp, sob, pnd, orthopnea, edema, palp, or loc.  He has itching and burning at times of his surgical incision site (varies with weather).  He denies any redness, swelling, warmth, discharge.  \par \par He had started cardiac rehab but had to stop due to COVID 19 related restrictions.  he has been exercising at home (stationary bike) and walking.  He remains compliant with meds.\par \par He needs a letter to his employer ([employer] de[employer]) and union ([employer]) letting them know that he may tentatively be able to return to work in July 2020.  This letter is needed to for medications coverage benefit.  Additionally, he needs to have a [employer] disability form completed (he is going to drop form off at office).\par \par Recommendations:\par 1. complete letter for medication coverage benefit\par 2. complete [employer] form\par 3. aloe lotion to incision site\par 4. exercise \par 5. continue meds\par 6. follow up in 3 months\par 7. will need exse/cpet in the fall

## 2020-05-27 ENCOUNTER — APPOINTMENT (OUTPATIENT)
Dept: NEPHROLOGY | Facility: CLINIC | Age: 60
End: 2020-05-27

## 2020-07-14 ENCOUNTER — APPOINTMENT (OUTPATIENT)
Dept: CARDIOTHORACIC SURGERY | Facility: CLINIC | Age: 60
End: 2020-07-14

## 2020-11-12 ENCOUNTER — NON-APPOINTMENT (OUTPATIENT)
Age: 60
End: 2020-11-12

## 2021-03-04 ENCOUNTER — APPOINTMENT (OUTPATIENT)
Dept: HEART AND VASCULAR | Facility: CLINIC | Age: 61
End: 2021-03-04
Payer: COMMERCIAL

## 2021-03-04 VITALS
DIASTOLIC BLOOD PRESSURE: 72 MMHG | TEMPERATURE: 97.5 F | WEIGHT: 242 LBS | OXYGEN SATURATION: 98 % | RESPIRATION RATE: 14 BRPM | SYSTOLIC BLOOD PRESSURE: 124 MMHG | HEART RATE: 66 BPM | BODY MASS INDEX: 40.27 KG/M2

## 2021-03-04 PROCEDURE — 99215 OFFICE O/P EST HI 40 MIN: CPT

## 2021-03-04 PROCEDURE — 99072 ADDL SUPL MATRL&STAF TM PHE: CPT

## 2021-03-04 NOTE — DISCUSSION/SUMMARY
[Coronary Artery Disease] : coronary artery disease [Possible Cardiac Ischemia (Intermd Prob)] : possible cardiac ischemia (intermediate probability) [Dietary Modification] : dietary modification [Weight Reduction] : weight reduction [Hyperlipidemia] : hyperlipidemia [Diet Modification] : diet modification [Exercise] : exercise [Hypertension] : hypertension [Stable] : stable [None] : none [Exercise Regimen] : an exercise regimen [Weight Loss] : weight loss [Sodium Restriction] : sodium restriction [Patient] : the patient [de-identified] : prior hx of PCI of CX; s/p off pump FABRICIO with LIMA to LAD/D1 abd L radial to OM1 [de-identified] : t/c additional meds

## 2021-03-04 NOTE — HISTORY OF PRESENT ILLNESS
[FreeTextEntry1] : Esteban Marino returns for follow up.  \par \par Today, he denies exertional cp.  He has incisional tightness (sternotomy) that occasionally radiates to the L parasternal area. He has ELIZABETH.  He denies pnd, orthopnea, edema, palp, or loc.\par \par CTA 10/17/2019: severe LAD/CX disease; mild RCA disease\par Cardiac Cath 10/31/2019: severe LAD with AV fistula; severe ostial D1; severe CX ISR; severe RPL (dominant RCA)\par Off pump FABRICIO 11/6/2019: LIMA to D1 (side to side) and to LAD (end to side); L radial to OM1.\par \par Post op complications noted in visit note from 12/26/2019.\par \par He is active(but not able to exercise consistently) and compliant with meds.\par \par Recommendations:\par 1. continue current CV meds\par 2. blood work\par 3. exercise\par 4. Based on clinical hx and current exertional limitations, he is unable to work.\par 5. EXSE/CPET\par 6. f/u post testing

## 2021-03-04 NOTE — PHYSICAL EXAM
[General Appearance - Well Developed] : well developed [Normal Appearance] : normal appearance [Well Groomed] : well groomed [General Appearance - Well Nourished] : well nourished [No Deformities] : no deformities [General Appearance - In No Acute Distress] : no acute distress [Normal Conjunctiva] : the conjunctiva exhibited no abnormalities [Eyelids - No Xanthelasma] : the eyelids demonstrated no xanthelasmas [Normal Oral Mucosa] : normal oral mucosa [No Oral Pallor] : no oral pallor [No Oral Cyanosis] : no oral cyanosis [Normal Jugular Venous A Waves Present] : normal jugular venous A waves present [Normal Jugular Venous V Waves Present] : normal jugular venous V waves present [No Jugular Venous Reddy A Waves] : no jugular venous reddy A waves [Respiration, Rhythm And Depth] : normal respiratory rhythm and effort [Exaggerated Use Of Accessory Muscles For Inspiration] : no accessory muscle use [Auscultation Breath Sounds / Voice Sounds] : lungs were clear to auscultation bilaterally [Heart Rate And Rhythm] : heart rate and rhythm were normal [Heart Sounds] : normal S1 and S2 [Murmurs] : no murmurs present [Abdomen Soft] : soft [Abdomen Tenderness] : non-tender [Abdomen Mass (___ Cm)] : no abdominal mass palpated [Abnormal Walk] : normal gait [Gait - Sufficient For Exercise Testing] : the gait was sufficient for exercise testing [Nail Clubbing] : no clubbing of the fingernails [Cyanosis, Localized] : no localized cyanosis [Petechial Hemorrhages (___cm)] : no petechial hemorrhages [Skin Color & Pigmentation] : normal skin color and pigmentation [] : no rash [No Venous Stasis] : no venous stasis [Skin Lesions] : no skin lesions [No Skin Ulcers] : no skin ulcer [No Xanthoma] : no  xanthoma was observed

## 2021-03-05 DIAGNOSIS — E87.6 HYPOKALEMIA: ICD-10-CM

## 2021-03-05 LAB
ALBUMIN SERPL ELPH-MCNC: 4.4 G/DL
ALP BLD-CCNC: 127 U/L
ALT SERPL-CCNC: 27 U/L
ANION GAP SERPL CALC-SCNC: 14 MMOL/L
AST SERPL-CCNC: 22 U/L
BASOPHILS # BLD AUTO: 0.08 K/UL
BASOPHILS NFR BLD AUTO: 0.7 %
BILIRUB SERPL-MCNC: 0.8 MG/DL
BUN SERPL-MCNC: 15 MG/DL
CALCIUM SERPL-MCNC: 9.7 MG/DL
CHLORIDE SERPL-SCNC: 102 MMOL/L
CHOLEST SERPL-MCNC: 145 MG/DL
CO2 SERPL-SCNC: 24 MMOL/L
CREAT SERPL-MCNC: 1.03 MG/DL
EOSINOPHIL # BLD AUTO: 0.32 K/UL
EOSINOPHIL NFR BLD AUTO: 2.9 %
ESTIMATED AVERAGE GLUCOSE: 114 MG/DL
GLUCOSE SERPL-MCNC: 135 MG/DL
HBA1C MFR BLD HPLC: 5.6 %
HCT VFR BLD CALC: 49.1 %
HDLC SERPL-MCNC: 42 MG/DL
HGB BLD-MCNC: 16.7 G/DL
IMM GRANULOCYTES NFR BLD AUTO: 1.1 %
LDLC SERPL CALC-MCNC: 87 MG/DL
LYMPHOCYTES # BLD AUTO: 2.44 K/UL
LYMPHOCYTES NFR BLD AUTO: 22.5 %
MAN DIFF?: NORMAL
MCHC RBC-ENTMCNC: 29.9 PG
MCHC RBC-ENTMCNC: 34 GM/DL
MCV RBC AUTO: 88 FL
MONOCYTES # BLD AUTO: 0.58 K/UL
MONOCYTES NFR BLD AUTO: 5.3 %
NEUTROPHILS # BLD AUTO: 7.31 K/UL
NEUTROPHILS NFR BLD AUTO: 67.5 %
NONHDLC SERPL-MCNC: 103 MG/DL
PLATELET # BLD AUTO: 324 K/UL
POTASSIUM SERPL-SCNC: 3.2 MMOL/L
PROT SERPL-MCNC: 7.4 G/DL
RBC # BLD: 5.58 M/UL
RBC # FLD: 13.3 %
SODIUM SERPL-SCNC: 140 MMOL/L
TRIGL SERPL-MCNC: 80 MG/DL
WBC # FLD AUTO: 10.85 K/UL

## 2021-03-13 ENCOUNTER — NON-APPOINTMENT (OUTPATIENT)
Age: 61
End: 2021-03-13

## 2021-04-09 ENCOUNTER — RX RENEWAL (OUTPATIENT)
Age: 61
End: 2021-04-09

## 2021-04-14 ENCOUNTER — APPOINTMENT (OUTPATIENT)
Dept: HEART AND VASCULAR | Facility: CLINIC | Age: 61
End: 2021-04-14
Payer: COMMERCIAL

## 2021-04-14 VITALS
DIASTOLIC BLOOD PRESSURE: 86 MMHG | HEIGHT: 65 IN | HEART RATE: 77 BPM | OXYGEN SATURATION: 96 % | BODY MASS INDEX: 40.32 KG/M2 | TEMPERATURE: 97.8 F | SYSTOLIC BLOOD PRESSURE: 146 MMHG | WEIGHT: 242 LBS

## 2021-04-14 PROCEDURE — 93325 DOPPLER ECHO COLOR FLOW MAPG: CPT

## 2021-04-14 PROCEDURE — 93320 DOPPLER ECHO COMPLETE: CPT

## 2021-04-14 PROCEDURE — 99072 ADDL SUPL MATRL&STAF TM PHE: CPT

## 2021-04-14 PROCEDURE — 93351 STRESS TTE COMPLETE: CPT

## 2021-04-22 ENCOUNTER — NON-APPOINTMENT (OUTPATIENT)
Age: 61
End: 2021-04-22

## 2021-05-18 ENCOUNTER — APPOINTMENT (OUTPATIENT)
Dept: HEART AND VASCULAR | Facility: CLINIC | Age: 61
End: 2021-05-18
Payer: COMMERCIAL

## 2021-05-18 VITALS
HEIGHT: 65 IN | DIASTOLIC BLOOD PRESSURE: 84 MMHG | OXYGEN SATURATION: 97 % | BODY MASS INDEX: 40.82 KG/M2 | SYSTOLIC BLOOD PRESSURE: 120 MMHG | WEIGHT: 245 LBS | TEMPERATURE: 98.1 F

## 2021-05-18 PROCEDURE — 99072 ADDL SUPL MATRL&STAF TM PHE: CPT

## 2021-05-18 PROCEDURE — 94727 GAS DIL/WSHOT DETER LNG VOL: CPT

## 2021-05-18 PROCEDURE — 94729 DIFFUSING CAPACITY: CPT

## 2021-05-18 PROCEDURE — 94010 BREATHING CAPACITY TEST: CPT | Mod: 59

## 2021-05-18 PROCEDURE — 94621 CARDIOPULM EXERCISE TESTING: CPT

## 2021-07-10 ENCOUNTER — NON-APPOINTMENT (OUTPATIENT)
Age: 61
End: 2021-07-10

## 2021-07-13 ENCOUNTER — APPOINTMENT (OUTPATIENT)
Dept: HEART AND VASCULAR | Facility: CLINIC | Age: 61
End: 2021-07-13
Payer: SELF-PAY

## 2021-07-13 VITALS
TEMPERATURE: 97.4 F | HEART RATE: 64 BPM | SYSTOLIC BLOOD PRESSURE: 154 MMHG | DIASTOLIC BLOOD PRESSURE: 92 MMHG | OXYGEN SATURATION: 97 % | HEIGHT: 65 IN | RESPIRATION RATE: 16 BRPM | BODY MASS INDEX: 40.32 KG/M2 | WEIGHT: 242 LBS

## 2021-07-13 PROCEDURE — 99215 OFFICE O/P EST HI 40 MIN: CPT

## 2021-07-13 RX ORDER — POTASSIUM CHLORIDE 750 MG/1
10 TABLET, FILM COATED, EXTENDED RELEASE ORAL DAILY
Qty: 3 | Refills: 0 | Status: DISCONTINUED | COMMUNITY
Start: 2021-03-05 | End: 2021-07-13

## 2021-07-14 NOTE — DISCUSSION/SUMMARY
[Coronary Artery Disease] : coronary artery disease [Possible Cardiac Ischemia (Intermd Prob)] : possible cardiac ischemia (intermediate probability) [Weight Reduction] : weight reduction [Hyperlipidemia] : hyperlipidemia [Diet Modification] : diet modification [Exercise] : exercise [Hypertension] : hypertension [Stable] : stable [None] : There are no changes in medication management [Exercise Regimen] : an exercise regimen [Dietary Modification] : dietary modification [Weight Loss] : weight loss [Low Sodium Diet] : low sodium diet [Patient] : the patient

## 2021-07-14 NOTE — HISTORY OF PRESENT ILLNESS
[FreeTextEntry1] : Esteban Marino returns for follow up.  \par \par Today, he denies exertional cp.  He has incisional sensitivity (sternotomy). His Jeter is improving..  He denies pnd, orthopnea, edema, palp, or loc.\par \par CTA 10/17/2019: severe LAD/CX disease; mild RCA disease\par Cardiac Cath 10/31/2019: severe LAD with AV fistula; severe ostial D1; severe CX ISR; severe RPL (dominant RCA)\par Off pump FABRICIO 11/6/2019: LIMA to D1 (side to side) and to LAD (end to side); L radial to OM1.\par \par Post op complications noted in visit note from 12/26/2019.\par \par He is active(but not able to exercise consistently) and compliant with meds.\par \par EXSE 4/2021: low nl lv sys fxn with RWMA; indeterminant ruelas fxn; 6:10 min Anil; no obvious ischemia\par CPET 5/2021: dec RER; 93% predicted peak VO2; good AT and peak O2 pulse\par \par Clinical hx and results reviewed in detail.\par \par Recommendations:\par 1. continue current CV meds\par 2. Mediterranean diet\par 3. exercise\par 4. f/u in 6 months\par 5. consider CTA

## 2021-10-27 ENCOUNTER — NON-APPOINTMENT (OUTPATIENT)
Age: 61
End: 2021-10-27

## 2021-11-15 ENCOUNTER — APPOINTMENT (OUTPATIENT)
Dept: HEART AND VASCULAR | Facility: CLINIC | Age: 61
End: 2021-11-15

## 2022-01-13 ENCOUNTER — APPOINTMENT (OUTPATIENT)
Dept: HEART AND VASCULAR | Facility: CLINIC | Age: 62
End: 2022-01-13
Payer: COMMERCIAL

## 2022-01-13 PROCEDURE — 99215 OFFICE O/P EST HI 40 MIN: CPT | Mod: 95

## 2022-01-13 NOTE — HISTORY OF PRESENT ILLNESS
[FreeTextEntry1] : Esteban Marino requests televideo follow up.  Consent obtained and recorded.  He is at his home and doctor is in [place], [place].  \par \par Today, he denies exertional cp.  He has incisional sensitivity (sternotomy). His ELIZABETH is improving..  He denies pnd, orthopnea, edema, palp, or loc.\par \par BP, pulse and pulse ox as recorded at home are good.\par \par CTA 10/17/2019: severe LAD/CX disease; mild RCA disease\par Cardiac Cath 10/31/2019: severe LAD with AV fistula; severe ostial D1; severe CX ISR; severe RPL (dominant RCA)\par Off pump FABRICIO 11/6/2019: LIMA to D1 (side to side) and to LAD (end to side); L radial to OM1.\par \par Post op complications noted in visit note from 12/26/2019.\par \par He is active(but not able to exercise consistently) and compliant with meds.\par \par EXSE 4/2021: low nl lv sys fxn with RWMA; indeterminant ruelas fxn; 6:10 min Anil; no obvious ischemia\par CPET 5/2021: dec RER; 93% predicted peak VO2; good AT and peak O2 pulse\par \par Clinical hx reviewed in detail.\par \par PE from 7/2021 eval.\par \par Recommendations:\par 1. continue current CV meds\par 2. Mediterranean diet\par 3. exercise\par 4. EXSE/CPET\par 5. carotid duplex\par 6. consider CTA

## 2022-01-13 NOTE — REVIEW OF SYSTEMS
[Joint Pain] : joint pain [Joint Swelling] : no joint swelling [Joint Stiffness] : joint stiffness [Muscle Cramps] : no muscle cramps [Myalgia] : myalgia [Negative] : Heme/Lymph

## 2022-01-13 NOTE — REASON FOR VISIT
[FreeTextEntry3] : Lauro Mcmahon [Follow-Up - Clinic] : a clinic follow-up of [Coronary Artery Disease] : coronary artery disease [Hyperlipidemia] : hyperlipidemia [Hypertension] : hypertension

## 2022-03-02 ENCOUNTER — RX RENEWAL (OUTPATIENT)
Age: 62
End: 2022-03-02

## 2022-03-22 ENCOUNTER — RX RENEWAL (OUTPATIENT)
Age: 62
End: 2022-03-22

## 2022-03-29 NOTE — DISCHARGE NOTE NURSING/CASE MANAGEMENT/SOCIAL WORK - NSDCVIVACCINE_GEN_ALL_CORE_FT
7/64)   Complicated UTI with hematuria - E faecalis  ETOH use  Pyelonephritis  Flank pain  Abnormal LFTs  Admits to THC use. PLAN:  Ampicillin - oral vs IV on discharge to be determined.    HIV screen for completion  CMV/EBV pending - follow up.   procal level        Imaging and labs were reviewed per medical records and any ID pertinent labs were also addressed        Mirna Shi DO No Vaccines Administered.

## 2022-05-09 ENCOUNTER — APPOINTMENT (OUTPATIENT)
Dept: HEART AND VASCULAR | Facility: CLINIC | Age: 62
End: 2022-05-09
Payer: COMMERCIAL

## 2022-05-09 VITALS
HEIGHT: 65 IN | HEART RATE: 72 BPM | SYSTOLIC BLOOD PRESSURE: 148 MMHG | OXYGEN SATURATION: 98 % | DIASTOLIC BLOOD PRESSURE: 86 MMHG | WEIGHT: 242 LBS | BODY MASS INDEX: 40.32 KG/M2

## 2022-05-09 PROCEDURE — 93351 STRESS TTE COMPLETE: CPT

## 2022-05-09 PROCEDURE — 93320 DOPPLER ECHO COMPLETE: CPT

## 2022-05-09 PROCEDURE — 93325 DOPPLER ECHO COLOR FLOW MAPG: CPT

## 2022-05-12 ENCOUNTER — NON-APPOINTMENT (OUTPATIENT)
Age: 62
End: 2022-05-12

## 2022-06-03 ENCOUNTER — APPOINTMENT (OUTPATIENT)
Dept: HEART AND VASCULAR | Facility: CLINIC | Age: 62
End: 2022-06-03
Payer: COMMERCIAL

## 2022-06-03 PROCEDURE — 93880 EXTRACRANIAL BILAT STUDY: CPT

## 2022-06-07 ENCOUNTER — RX RENEWAL (OUTPATIENT)
Age: 62
End: 2022-06-07

## 2022-06-08 ENCOUNTER — NON-APPOINTMENT (OUTPATIENT)
Age: 62
End: 2022-06-08

## 2022-06-21 ENCOUNTER — RX RENEWAL (OUTPATIENT)
Age: 62
End: 2022-06-21

## 2022-06-23 RX ORDER — METOPROLOL SUCCINATE 50 MG/1
50 TABLET, EXTENDED RELEASE ORAL
Qty: 90 | Refills: 3 | Status: ACTIVE | COMMUNITY
Start: 2019-12-26 | End: 1900-01-01

## 2022-07-26 ENCOUNTER — TRANSCRIPTION ENCOUNTER (OUTPATIENT)
Age: 62
End: 2022-07-26

## 2022-07-26 ENCOUNTER — APPOINTMENT (OUTPATIENT)
Dept: HEART AND VASCULAR | Facility: CLINIC | Age: 62
End: 2022-07-26

## 2022-07-26 VITALS
DIASTOLIC BLOOD PRESSURE: 88 MMHG | HEIGHT: 65 IN | TEMPERATURE: 98 F | WEIGHT: 242 LBS | OXYGEN SATURATION: 97 % | SYSTOLIC BLOOD PRESSURE: 130 MMHG | BODY MASS INDEX: 40.32 KG/M2

## 2022-07-26 PROCEDURE — 94621 CARDIOPULM EXERCISE TESTING: CPT

## 2022-07-26 PROCEDURE — 94010 BREATHING CAPACITY TEST: CPT | Mod: 59

## 2022-08-04 ENCOUNTER — NON-APPOINTMENT (OUTPATIENT)
Age: 62
End: 2022-08-04

## 2022-08-11 ENCOUNTER — APPOINTMENT (OUTPATIENT)
Dept: HEART AND VASCULAR | Facility: CLINIC | Age: 62
End: 2022-08-11

## 2022-08-11 VITALS
RESPIRATION RATE: 16 BRPM | OXYGEN SATURATION: 97 % | DIASTOLIC BLOOD PRESSURE: 84 MMHG | HEART RATE: 74 BPM | HEIGHT: 65 IN | WEIGHT: 241 LBS | BODY MASS INDEX: 40.15 KG/M2 | SYSTOLIC BLOOD PRESSURE: 120 MMHG | TEMPERATURE: 97.8 F

## 2022-08-11 PROCEDURE — 36415 COLL VENOUS BLD VENIPUNCTURE: CPT

## 2022-08-12 ENCOUNTER — NON-APPOINTMENT (OUTPATIENT)
Age: 62
End: 2022-08-12

## 2022-08-12 LAB
ALBUMIN SERPL ELPH-MCNC: 4.2 G/DL
ALP BLD-CCNC: 105 U/L
ALT SERPL-CCNC: 19 U/L
ANION GAP SERPL CALC-SCNC: 16 MMOL/L
AST SERPL-CCNC: 17 U/L
BILIRUB SERPL-MCNC: 0.6 MG/DL
BUN SERPL-MCNC: 16 MG/DL
CALCIUM SERPL-MCNC: 9.4 MG/DL
CHLORIDE SERPL-SCNC: 103 MMOL/L
CO2 SERPL-SCNC: 20 MMOL/L
CREAT SERPL-MCNC: 0.83 MG/DL
EGFR: 98 ML/MIN/1.73M2
GLUCOSE SERPL-MCNC: 116 MG/DL
POTASSIUM SERPL-SCNC: 3.7 MMOL/L
PROT SERPL-MCNC: 7.5 G/DL
SODIUM SERPL-SCNC: 139 MMOL/L

## 2022-08-29 ENCOUNTER — RX RENEWAL (OUTPATIENT)
Age: 62
End: 2022-08-29

## 2022-10-17 ENCOUNTER — RESULT REVIEW (OUTPATIENT)
Age: 62
End: 2022-10-17

## 2022-10-26 ENCOUNTER — NON-APPOINTMENT (OUTPATIENT)
Age: 62
End: 2022-10-26

## 2022-12-02 ENCOUNTER — NON-APPOINTMENT (OUTPATIENT)
Age: 62
End: 2022-12-02

## 2022-12-08 ENCOUNTER — RX RENEWAL (OUTPATIENT)
Age: 62
End: 2022-12-08

## 2023-01-24 ENCOUNTER — APPOINTMENT (OUTPATIENT)
Dept: HEART AND VASCULAR | Facility: CLINIC | Age: 63
End: 2023-01-24
Payer: COMMERCIAL

## 2023-01-24 VITALS
HEIGHT: 65 IN | WEIGHT: 240 LBS | HEART RATE: 67 BPM | SYSTOLIC BLOOD PRESSURE: 146 MMHG | BODY MASS INDEX: 39.99 KG/M2 | DIASTOLIC BLOOD PRESSURE: 90 MMHG | RESPIRATION RATE: 16 BRPM | OXYGEN SATURATION: 98 % | TEMPERATURE: 97.6 F

## 2023-01-24 DIAGNOSIS — R94.39 ABNORMAL RESULT OF OTHER CARDIOVASCULAR FUNCTION STUDY: ICD-10-CM

## 2023-01-24 DIAGNOSIS — I25.9 CHRONIC ISCHEMIC HEART DISEASE, UNSPECIFIED: ICD-10-CM

## 2023-01-24 DIAGNOSIS — Z00.00 ENCOUNTER FOR GENERAL ADULT MEDICAL EXAMINATION W/OUT ABNORMAL FINDINGS: ICD-10-CM

## 2023-01-24 PROCEDURE — 99215 OFFICE O/P EST HI 40 MIN: CPT

## 2023-01-29 PROBLEM — I25.9 MYOCARDIAL ISCHEMIA: Status: ACTIVE | Noted: 2022-07-26

## 2023-01-29 PROBLEM — R94.39 ABNORMAL STRESS ECG: Status: ACTIVE | Noted: 2022-07-26

## 2023-01-29 PROBLEM — Z00.00 ENCOUNTER FOR PREVENTIVE HEALTH EXAMINATION: Status: ACTIVE | Noted: 2019-05-16

## 2023-01-29 NOTE — HISTORY OF PRESENT ILLNESS
[FreeTextEntry1] : Esteban Marino returns for follow up.  \par \par Today, he denies exertional cp.  He has incisional sensitivity (sternotomy).  He denies sob, pnd, orthopnea, edema, palp, or loc.\par \par He is following with Urology for BPH.\par \par His activity is improving (knee - gel shots).  He is compliant with meds.\par \par CTA 10/17/2019: severe LAD/CX disease; mild RCA disease\par Cardiac Cath 10/31/2019: severe LAD with AV fistula; severe ostial D1; severe CX ISR; severe RPL (dominant RCA)\par Off pump FABRICIO 11/6/2019: LIMA to D1 (side to side) and to LAD (end to side); L radial to OM1.\par \par EXSE 4/2021: low nl lv sys fxn with RWMA; indeterminant ruelas fxn; 6:10 min Anil; no obvious ischemia\par CPET 5/2021: dec RER; 93% predicted peak VO2; good AT and peak O2 pulse\par \par EXSE 5/2022: nl LVEF with RWMA; ruelas dysfxn; 7:05 min Anil; pos ECG; ischemia\par Carotid Duplex 6/2022: min disease b/l\par CPET 7/2022: ischemic myocardial dysfxn 88% predicted peak VO2\par CTA 10/2022: native vessel disease; midl RCA; patent LIMA to LAD/D1 and radial to OM\par \par Clinical hx and results reviewed in detail.\par \par Recommendations:\par 1. continue current CV meds\par 2. Mediterranean diet\par 3. exercise\par 4. f/u in 3 months

## 2023-01-29 NOTE — REASON FOR VISIT
[Hyperlipidemia] : hyperlipidemia [Hypertension] : hypertension [Coronary Artery Disease] : coronary artery disease [Carotid, Aortic and Peripheral Vascular Disease] : carotid, aortic and peripheral vascular disease [FreeTextEntry3] : Lauro Mcmahon

## 2023-01-29 NOTE — DISCUSSION/SUMMARY
[Sodium Restriction] : sodium restriction [Coronary Artery Disease] : coronary artery disease [Possible Cardiac Ischemia (Intermd Prob)] : possible cardiac ischemia (intermediate probability) [Weight Reduction] : weight reduction [Hyperlipidemia] : hyperlipidemia [Diet Modification] : diet modification [Exercise] : exercise [Hypertension] : hypertension [Exercise Regimen] : an exercise regimen [Dietary Modification] : dietary modification [Weight Loss] : weight loss [Low Sodium Diet] : low sodium diet [Stable] : stable [None] : There are no changes in medication management [Exercise Rehab] : exercise rehabilitation [Patient] : the patient [de-identified] : nl LVEF with RWMA [de-identified] : min carotid disease b/l 6/2022

## 2023-03-17 ENCOUNTER — RX RENEWAL (OUTPATIENT)
Age: 63
End: 2023-03-17

## 2023-03-17 RX ORDER — LISINOPRIL AND HYDROCHLOROTHIAZIDE TABLETS 10; 12.5 MG/1; MG/1
10-12.5 TABLET ORAL DAILY
Qty: 90 | Refills: 0 | Status: ACTIVE | COMMUNITY
Start: 2022-03-02 | End: 1900-01-01

## 2023-03-17 RX ORDER — AMLODIPINE BESYLATE 10 MG/1
10 TABLET ORAL DAILY
Qty: 90 | Refills: 0 | Status: ACTIVE | COMMUNITY
Start: 2022-03-02 | End: 1900-01-01

## 2023-04-25 ENCOUNTER — APPOINTMENT (OUTPATIENT)
Dept: HEART AND VASCULAR | Facility: CLINIC | Age: 63
End: 2023-04-25

## 2023-04-25 ENCOUNTER — APPOINTMENT (OUTPATIENT)
Dept: HEART AND VASCULAR | Facility: CLINIC | Age: 63
End: 2023-04-25
Payer: COMMERCIAL

## 2023-04-25 VITALS
RESPIRATION RATE: 16 BRPM | BODY MASS INDEX: 40.82 KG/M2 | OXYGEN SATURATION: 96 % | DIASTOLIC BLOOD PRESSURE: 90 MMHG | WEIGHT: 245 LBS | TEMPERATURE: 97.6 F | HEIGHT: 65 IN | SYSTOLIC BLOOD PRESSURE: 144 MMHG | HEART RATE: 93 BPM

## 2023-04-25 PROCEDURE — 99215 OFFICE O/P EST HI 40 MIN: CPT

## 2023-04-26 LAB
ALBUMIN SERPL ELPH-MCNC: 4.7 G/DL
ALP BLD-CCNC: 165 U/L
ALT SERPL-CCNC: 34 U/L
ANION GAP SERPL CALC-SCNC: 16 MMOL/L
AST SERPL-CCNC: 22 U/L
BILIRUB SERPL-MCNC: 0.8 MG/DL
BUN SERPL-MCNC: 19 MG/DL
CALCIUM SERPL-MCNC: 10.1 MG/DL
CHLORIDE SERPL-SCNC: 99 MMOL/L
CHOLEST SERPL-MCNC: 155 MG/DL
CO2 SERPL-SCNC: 24 MMOL/L
CREAT SERPL-MCNC: 0.92 MG/DL
EGFR: 93 ML/MIN/1.73M2
GLUCOSE SERPL-MCNC: 88 MG/DL
HDLC SERPL-MCNC: 47 MG/DL
LDLC SERPL CALC-MCNC: 89 MG/DL
NONHDLC SERPL-MCNC: 108 MG/DL
POTASSIUM SERPL-SCNC: 3.4 MMOL/L
PROT SERPL-MCNC: 7.8 G/DL
SODIUM SERPL-SCNC: 138 MMOL/L
TRIGL SERPL-MCNC: 97 MG/DL

## 2023-04-27 NOTE — HISTORY OF PRESENT ILLNESS
[FreeTextEntry1] : Esteban Marino returns for follow up.  \par \par Today, he denies exertional cp.  He has incisional sensitivity (sternotomy).  He denies sob, pnd, orthopnea, edema, palp, or loc.\par \par He is following with Urology for BPH.\par \par His activity is improving (knee - gel shots).  He is compliant with meds.\par \par CTA 10/17/2019: severe LAD/CX disease; mild RCA disease\par Cardiac Cath 10/31/2019: severe LAD with AV fistula; severe ostial D1; severe CX ISR; severe RPL (dominant RCA)\par Off pump FABRICIO 11/6/2019: LIMA to D1 (side to side) and to LAD (end to side); L radial to OM1.\par \par EXSE 4/2021: low nl lv sys fxn with RWMA; indeterminant ruelas fxn; 6:10 min Anil; no obvious ischemia\par CPET 5/2021: dec RER; 93% predicted peak VO2; good AT and peak O2 pulse\par \par EXSE 5/2022: nl LVEF with RWMA; ruelas dysfxn; 7:05 min Anil; pos ECG; ischemia\par Carotid Duplex 6/2022: min disease b/l\par CPET 7/2022: ischemic myocardial dysfxn 88% predicted peak VO2\par CTA 10/2022: native vessel disease; midl RCA; patent LIMA to LAD/D1 and radial to OM\par \par Clinical hx reviewed in detail.\par \par Recommendations:\par 1. continue current CV meds\par 2. Mediterranean diet\par 3. exercise\par 4. blood work\par 5. f/u in 4 months

## 2023-04-27 NOTE — DISCUSSION/SUMMARY
[Sodium Restriction] : sodium restriction [Coronary Artery Disease] : coronary artery disease [Possible Cardiac Ischemia (Intermd Prob)] : possible cardiac ischemia (intermediate probability) [Weight Reduction] : weight reduction [Hyperlipidemia] : hyperlipidemia [Diet Modification] : diet modification [Exercise] : exercise [Hypertension] : hypertension [Exercise Regimen] : an exercise regimen [Dietary Modification] : dietary modification [Weight Loss] : weight loss [Low Sodium Diet] : low sodium diet [Stable] : stable [None] : There are no changes in medication management [Exercise Rehab] : exercise rehabilitation [Patient] : the patient [de-identified] : nl LVEF with RWMA [de-identified] : min carotid disease b/l 6/2022

## 2023-04-28 LAB
BASOPHILS # BLD AUTO: 0.11 K/UL
BASOPHILS NFR BLD AUTO: 0.8 %
EOSINOPHIL # BLD AUTO: 0.21 K/UL
EOSINOPHIL NFR BLD AUTO: 1.4 %
HCT VFR BLD CALC: 50.6 %
HGB BLD-MCNC: 17.2 G/DL
IMM GRANULOCYTES NFR BLD AUTO: 1.9 %
LYMPHOCYTES # BLD AUTO: 3 K/UL
LYMPHOCYTES NFR BLD AUTO: 20.7 %
MAN DIFF?: NORMAL
MCHC RBC-ENTMCNC: 30.3 PG
MCHC RBC-ENTMCNC: 34 GM/DL
MCV RBC AUTO: 89.1 FL
MONOCYTES # BLD AUTO: 0.97 K/UL
MONOCYTES NFR BLD AUTO: 6.7 %
NEUTROPHILS # BLD AUTO: 9.93 K/UL
NEUTROPHILS NFR BLD AUTO: 68.5 %
PLATELET # BLD AUTO: 375 K/UL
RBC # BLD: 5.68 M/UL
RBC # FLD: 13.4 %
WBC # FLD AUTO: 14.49 K/UL

## 2023-05-04 ENCOUNTER — NON-APPOINTMENT (OUTPATIENT)
Age: 63
End: 2023-05-04

## 2023-06-01 ENCOUNTER — RX RENEWAL (OUTPATIENT)
Age: 63
End: 2023-06-01

## 2023-06-01 RX ORDER — CLOPIDOGREL BISULFATE 75 MG/1
75 TABLET, FILM COATED ORAL DAILY
Qty: 90 | Refills: 0 | Status: ACTIVE | COMMUNITY
Start: 2022-06-07 | End: 1900-01-01

## 2023-08-10 ENCOUNTER — NON-APPOINTMENT (OUTPATIENT)
Age: 63
End: 2023-08-10

## 2023-08-10 ENCOUNTER — APPOINTMENT (OUTPATIENT)
Dept: HEART AND VASCULAR | Facility: CLINIC | Age: 63
End: 2023-08-10
Payer: COMMERCIAL

## 2023-08-10 VITALS
SYSTOLIC BLOOD PRESSURE: 134 MMHG | WEIGHT: 242 LBS | RESPIRATION RATE: 16 BRPM | DIASTOLIC BLOOD PRESSURE: 84 MMHG | OXYGEN SATURATION: 98 % | BODY MASS INDEX: 40.32 KG/M2 | HEIGHT: 65 IN | TEMPERATURE: 97.6 F | HEART RATE: 74 BPM

## 2023-08-10 DIAGNOSIS — I65.29 OCCLUSION AND STENOSIS OF UNSPECIFIED CAROTID ARTERY: ICD-10-CM

## 2023-08-10 PROCEDURE — 99215 OFFICE O/P EST HI 40 MIN: CPT

## 2023-08-22 PROBLEM — I65.29 CAROTID ARTERY PLAQUE: Status: ACTIVE | Noted: 2022-07-26

## 2023-08-22 NOTE — REVIEW OF SYSTEMS
[Joint Swelling] : no joint swelling [Joint Pain] : joint pain [Joint Stiffness] : joint stiffness [Muscle Cramps] : no muscle cramps [Myalgia] : myalgia [Negative] : Heme/Lymph

## 2023-08-22 NOTE — REASON FOR VISIT
[Hyperlipidemia] : hyperlipidemia [Hypertension] : hypertension [Coronary Artery Disease] : coronary artery disease [Carotid, Aortic and Peripheral Vascular Disease] : carotid, aortic and peripheral vascular disease [FreeTextEntry3] : Martha Amato

## 2023-08-22 NOTE — DISCUSSION/SUMMARY
[Sodium Restriction] : sodium restriction [Coronary Artery Disease] : coronary artery disease [Possible Cardiac Ischemia (Intermd Prob)] : possible cardiac ischemia (intermediate probability) [Weight Reduction] : weight reduction [Hyperlipidemia] : hyperlipidemia [Diet Modification] : diet modification [Exercise] : exercise [Hypertension] : hypertension [Exercise Regimen] : an exercise regimen [Dietary Modification] : dietary modification [Weight Loss] : weight loss [Low Sodium Diet] : low sodium diet [Stable] : stable [None] : There are no changes in medication management [Exercise Rehab] : exercise rehabilitation [Patient] : the patient [de-identified] : min carotid disease b/l 6/2022 [de-identified] : nl LVEF with RWMA

## 2023-08-22 NOTE — PHYSICAL EXAM
[General Appearance - Well Developed] : well developed [Normal Appearance] : normal appearance [Well Groomed] : well groomed [General Appearance - Well Nourished] : well nourished [No Deformities] : no deformities [Normal Conjunctiva] : the conjunctiva exhibited no abnormalities [General Appearance - In No Acute Distress] : no acute distress [Eyelids - No Xanthelasma] : the eyelids demonstrated no xanthelasmas [Normal Oral Mucosa] : normal oral mucosa [No Oral Pallor] : no oral pallor [No Oral Cyanosis] : no oral cyanosis [Normal Jugular Venous A Waves Present] : normal jugular venous A waves present [Normal Jugular Venous V Waves Present] : normal jugular venous V waves present [No Jugular Venous Reddy A Waves] : no jugular venous reddy A waves [Respiration, Rhythm And Depth] : normal respiratory rhythm and effort [Exaggerated Use Of Accessory Muscles For Inspiration] : no accessory muscle use [Auscultation Breath Sounds / Voice Sounds] : lungs were clear to auscultation bilaterally [Heart Rate And Rhythm] : heart rate and rhythm were normal [Murmurs] : no murmurs present [Heart Sounds] : normal S1 and S2 [Abdomen Soft] : soft [Abdomen Tenderness] : non-tender [Abnormal Walk] : normal gait [Abdomen Mass (___ Cm)] : no abdominal mass palpated [Gait - Sufficient For Exercise Testing] : the gait was sufficient for exercise testing [Nail Clubbing] : no clubbing of the fingernails [Cyanosis, Localized] : no localized cyanosis [Petechial Hemorrhages (___cm)] : no petechial hemorrhages [Skin Color & Pigmentation] : normal skin color and pigmentation [] : no rash [No Venous Stasis] : no venous stasis [Skin Lesions] : no skin lesions [No Skin Ulcers] : no skin ulcer [No Xanthoma] : no  xanthoma was observed

## 2023-08-22 NOTE — HISTORY OF PRESENT ILLNESS
[FreeTextEntry1] : Esteban Marino returns for follow up.    Today, he denies exertional cp.  He has incisional sensitivity (sternotomy).  He denies sob, pnd, orthopnea, edema, palp, or loc.  He is following with Urology for BPH and inc PSA - ([facility] with [facility]).  His activity is improving (knee - gel shots).  He is compliant with meds.  CTA 10/17/2019: severe LAD/CX disease; mild RCA disease Cardiac Cath 10/31/2019: severe LAD with AV fistula; severe ostial D1; severe CX ISR; severe RPL (dominant RCA) Off pump FABRICIO 11/6/2019: LIMA to D1 (side to side) and to LAD (end to side); L radial to OM1.  EXSE 4/2021: low nl lv sys fxn with RWMA; indeterminant ruelas fxn; 6:10 min Anil; no obvious ischemia CPET 5/2021: dec RER; 93% predicted peak VO2; good AT and peak O2 pulse  EXSE 5/2022: nl LVEF with RWMA; ruelas dysfxn; 7:05 min Anil; pos ECG; ischemia Carotid Duplex 6/2022: min disease b/l CPET 7/2022: ischemic myocardial dysfxn 88% predicted peak VO2 CTA 10/2022: native vessel disease; midl RCA; patent LIMA to LAD/D1 and radial to OM  Clinical hx reviewed in detail.  Recommendations: 1. continue current CV meds 2. Mediterranean diet 3. exercise 4. collect records  5. f/u in 4 months

## 2023-09-18 ENCOUNTER — APPOINTMENT (OUTPATIENT)
Dept: HEART AND VASCULAR | Facility: CLINIC | Age: 63
End: 2023-09-18
Payer: COMMERCIAL

## 2023-09-18 VITALS
SYSTOLIC BLOOD PRESSURE: 130 MMHG | DIASTOLIC BLOOD PRESSURE: 90 MMHG | WEIGHT: 242 LBS | TEMPERATURE: 97.8 F | RESPIRATION RATE: 16 BRPM | HEART RATE: 90 BPM | HEIGHT: 65 IN | BODY MASS INDEX: 40.32 KG/M2 | OXYGEN SATURATION: 97 %

## 2023-09-18 PROCEDURE — 99213 OFFICE O/P EST LOW 20 MIN: CPT

## 2023-11-08 NOTE — END OF VISIT
[Time Spent: ___ minutes] : I have spent [unfilled] minutes of time on the encounter. Detail Level: Detailed Depth Of Biopsy: dermis Was A Bandage Applied: Yes Size Of Lesion In Cm: 0 Biopsy Type: H and E Biopsy Method: Dermablade Anesthesia Type: 1% lidocaine with epinephrine Anesthesia Volume In Cc: 0.5 Hemostasis: Drysol Wound Care: Petrolatum Dressing: bandage Destruction After The Procedure: No Type Of Destruction Used: Curettage Curettage Text: The wound bed was treated with curettage after the biopsy was performed. Cryotherapy Text: The wound bed was treated with cryotherapy after the biopsy was performed. Electrodesiccation Text: The wound bed was treated with electrodesiccation after the biopsy was performed. Electrodesiccation And Curettage Text: The wound bed was treated with electrodesiccation and curettage after the biopsy was performed. Silver Nitrate Text: The wound bed was treated with silver nitrate after the biopsy was performed. Consent: Written consent was obtained and risks were reviewed including but not limited to scarring, infection, bleeding, scabbing, incomplete removal, nerve damage and allergy to anesthesia. Post-Care Instructions: I reviewed with the patient in detail post-care instructions. Patient is to keep the biopsy site dry overnight, and then apply bacitracin twice daily until healed. Patient may apply hydrogen peroxide soaks to remove any crusting. Notification Instructions: Patient will be notified of biopsy results. However, patient instructed to call the office if not contacted within 2 weeks. Billing Type: Third-Party Bill Information: Selecting Yes will display possible errors in your note based on the variables you have selected. This validation is only offered as a suggestion for you. PLEASE NOTE THAT THE VALIDATION TEXT WILL BE REMOVED WHEN YOU FINALIZE YOUR NOTE. IF YOU WANT TO FAX A PRELIMINARY NOTE YOU WILL NEED TO TOGGLE THIS TO 'NO' IF YOU DO NOT WANT IT IN YOUR FAXED NOTE.

## 2023-11-28 ENCOUNTER — NON-APPOINTMENT (OUTPATIENT)
Age: 63
End: 2023-11-28

## 2024-03-12 ENCOUNTER — RX RENEWAL (OUTPATIENT)
Age: 64
End: 2024-03-12

## 2024-03-12 RX ORDER — ATORVASTATIN CALCIUM 40 MG/1
40 TABLET, FILM COATED ORAL
Qty: 90 | Refills: 0 | Status: ACTIVE | COMMUNITY
Start: 2021-07-10 | End: 1900-01-01

## 2024-04-01 ENCOUNTER — APPOINTMENT (OUTPATIENT)
Dept: HEART AND VASCULAR | Facility: CLINIC | Age: 64
End: 2024-04-01
Payer: MEDICARE

## 2024-04-11 ENCOUNTER — APPOINTMENT (OUTPATIENT)
Dept: HEART AND VASCULAR | Facility: CLINIC | Age: 64
End: 2024-04-11
Payer: COMMERCIAL

## 2024-04-11 VITALS
WEIGHT: 240 LBS | RESPIRATION RATE: 17 BRPM | DIASTOLIC BLOOD PRESSURE: 84 MMHG | TEMPERATURE: 96.8 F | SYSTOLIC BLOOD PRESSURE: 126 MMHG | OXYGEN SATURATION: 98 % | BODY MASS INDEX: 39.99 KG/M2 | HEIGHT: 65 IN | HEART RATE: 76 BPM

## 2024-04-11 DIAGNOSIS — I10 ESSENTIAL (PRIMARY) HYPERTENSION: ICD-10-CM

## 2024-04-11 DIAGNOSIS — E78.5 HYPERLIPIDEMIA, UNSPECIFIED: ICD-10-CM

## 2024-04-11 DIAGNOSIS — Z95.1 PRESENCE OF AORTOCORONARY BYPASS GRAFT: ICD-10-CM

## 2024-04-11 DIAGNOSIS — I25.10 ATHEROSCLEROTIC HEART DISEASE OF NATIVE CORONARY ARTERY W/OUT ANGINA PECTORIS: ICD-10-CM

## 2024-04-11 PROCEDURE — 99214 OFFICE O/P EST MOD 30 MIN: CPT

## 2024-04-11 RX ORDER — OXYBUTYNIN CHLORIDE 5 MG/1
5 TABLET ORAL DAILY
Refills: 0 | Status: ACTIVE | COMMUNITY

## 2024-04-11 RX ORDER — DUPILUMAB 300 MG/2ML
300 INJECTION, SOLUTION SUBCUTANEOUS
Refills: 0 | Status: DISCONTINUED | COMMUNITY
End: 2024-04-11

## 2024-04-11 NOTE — DISCUSSION/SUMMARY
[Coronary Artery Disease] : coronary artery disease [Hyperlipidemia] : hyperlipidemia [Hypertension] : hypertension [Exercise Regimen] : an exercise regimen [Dietary Modification] : dietary modification [Low Sodium Diet] : low sodium diet [Stable] : stable [Continue] : continuing statins [de-identified] : s/p CABG (LIMA to LAD and radial to OM1) [de-identified] : mild carotid disease b/l

## 2024-04-11 NOTE — REASON FOR VISIT
[Arrhythmia/ECG Abnorrmalities] : arrhythmia/ECG abnormalities [Hyperlipidemia] : hyperlipidemia [Hypertension] : hypertension [Coronary Artery Disease] : coronary artery disease [Carotid, Aortic and Peripheral Vascular Disease] : carotid, aortic and peripheral vascular disease [FreeTextEntry3] : Dr. Martha Amato [FreeTextEntry1] : CV follow up.

## 2024-04-11 NOTE — REVIEW OF SYSTEMS
[Joint Pain] : joint pain [Negative] : Heme/Lymph [Joint Swelling] : no joint swelling [Joint Stiffness] : no joint stiffness [Muscle Cramps] : no muscle cramps [Myalgia] : no myalgia [FreeTextEntry5] : as per HPI

## 2024-04-11 NOTE — ASSESSMENT
[FreeTextEntry1] : 62 yo male with PMHx of CAD s/p CABG (LIMA to LAD and radial to OM1), HTN, mild carotid disease, HLD here for CV follow up.  - continue current CV meds - continue to salt restrict and increase PO hydration - mediterranean diet - > 30 min aerobic exercise daily encouraged - EXSE - TTE - f/u in 6 months with Dr. Maldonado

## 2024-04-11 NOTE — HISTORY OF PRESENT ILLNESS
[FreeTextEntry1] : 64 yo male with PMHx of CAD s/p CABG (LIMA to LAD and radial to OM1 - 2019), HTN, mild carotid disease, HLD here for CV follow up.   - Former patient of Dr. Cayden Lemus - Reports chest discomfort at the sternotomy site that is reproducible, he states has been present since bypass surgery and is at baseline.  - Denies chest pain on exertion. No dyspnea on exertion. Denies pnd, orthopnea, LE edema, or syncope  He is active but is limited by b/l knee osteoarthritis.  Continues to take ASA/plavix daily. No recent stents in the past 1 year.  Cardiac Workup: CTA 10/17/2019: severe LAD/CX disease; mild RCA disease Cardiac Cath 10/31/2019: severe LAD with AV fistula; severe ostial D1; severe CX ISR; severe RPL (dominant RCA) Off pump FABRICIO 11/6/2019: LIMA to D1 (side to side) and to LAD (end to side); L radial to OM1. EXSE 4/2021: low nl lv sys fxn with RWMA; indeterminant ruelas fxn; 6:10 min Anil; no obvious ischemia CPET 5/2021: dec RER; 93% predicted peak VO2; good AT and peak O2 pulse EXSE 5/2022: nl LVEF with RWMA; ruelas dysfxn; 7:05 min Anil; pos ECG; ischemia Carotid Duplex 6/2022: min disease b/l CPET 7/2022: ischemic myocardial dysfxn 88% predicted peak VO2 CTA 10/2022: native vessel disease; midl RCA; patent LIMA to LAD/D1 and radial to OM EKG 8/10/23: interventicular condction delay (QRSd 117ms), NSR

## 2024-05-14 ENCOUNTER — NON-APPOINTMENT (OUTPATIENT)
Age: 64
End: 2024-05-14

## 2024-06-24 ENCOUNTER — APPOINTMENT (OUTPATIENT)
Dept: HEART AND VASCULAR | Facility: CLINIC | Age: 64
End: 2024-06-24
Payer: COMMERCIAL

## 2024-06-24 VITALS
HEIGHT: 65 IN | OXYGEN SATURATION: 97 % | DIASTOLIC BLOOD PRESSURE: 78 MMHG | BODY MASS INDEX: 38.82 KG/M2 | WEIGHT: 233 LBS | SYSTOLIC BLOOD PRESSURE: 137 MMHG

## 2024-06-24 PROCEDURE — 93320 DOPPLER ECHO COMPLETE: CPT

## 2024-06-24 PROCEDURE — 93351 STRESS TTE COMPLETE: CPT

## 2024-06-24 PROCEDURE — 93325 DOPPLER ECHO COLOR FLOW MAPG: CPT

## 2024-07-05 RX ORDER — ASPIRIN 81 MG/1
TABLET, DELAYED RELEASE ORAL
Refills: 0 | Status: ACTIVE | COMMUNITY

## 2024-07-08 ENCOUNTER — APPOINTMENT (OUTPATIENT)
Dept: HEART AND VASCULAR | Facility: CLINIC | Age: 64
End: 2024-07-08

## 2024-07-08 VITALS
OXYGEN SATURATION: 97 % | SYSTOLIC BLOOD PRESSURE: 110 MMHG | DIASTOLIC BLOOD PRESSURE: 78 MMHG | WEIGHT: 241 LBS | RESPIRATION RATE: 18 BRPM | HEART RATE: 68 BPM | HEIGHT: 65 IN | BODY MASS INDEX: 40.15 KG/M2

## 2024-07-08 DIAGNOSIS — I25.10 ATHEROSCLEROTIC HEART DISEASE OF NATIVE CORONARY ARTERY W/OUT ANGINA PECTORIS: ICD-10-CM

## 2024-07-08 DIAGNOSIS — I10 ESSENTIAL (PRIMARY) HYPERTENSION: ICD-10-CM

## 2024-07-08 DIAGNOSIS — E78.5 HYPERLIPIDEMIA, UNSPECIFIED: ICD-10-CM

## 2024-07-08 DIAGNOSIS — Z95.1 PRESENCE OF AORTOCORONARY BYPASS GRAFT: ICD-10-CM

## 2024-07-08 DIAGNOSIS — R94.39 ABNORMAL RESULT OF OTHER CARDIOVASCULAR FUNCTION STUDY: ICD-10-CM

## 2024-07-08 PROCEDURE — 99214 OFFICE O/P EST MOD 30 MIN: CPT

## 2024-07-22 RX ORDER — SACUBITRIL AND VALSARTAN 49; 51 MG/1; MG/1
49-51 TABLET, FILM COATED ORAL TWICE DAILY
Qty: 60 | Refills: 3 | Status: ACTIVE | COMMUNITY
Start: 2024-07-22 | End: 1900-01-01

## 2024-07-24 ENCOUNTER — APPOINTMENT (OUTPATIENT)
Dept: ORTHOPEDIC SURGERY | Facility: CLINIC | Age: 64
End: 2024-07-24
Payer: MEDICARE

## 2024-07-24 VITALS — WEIGHT: 241 LBS | BODY MASS INDEX: 40.15 KG/M2 | HEIGHT: 65 IN

## 2024-07-24 DIAGNOSIS — M17.0 BILATERAL PRIMARY OSTEOARTHRITIS OF KNEE: ICD-10-CM

## 2024-07-24 PROCEDURE — 73564 X-RAY EXAM KNEE 4 OR MORE: CPT | Mod: 50

## 2024-07-24 PROCEDURE — 99204 OFFICE O/P NEW MOD 45 MIN: CPT | Mod: 57

## 2024-07-24 NOTE — HISTORY OF PRESENT ILLNESS
[de-identified] : Patient comes in with more than 6 months of right knee pain atraumatic in onset.  Patient has tried greater than 6 months of nsaids, physical therapy and doctor directed exercises and injections.  Patient continues to have pain that is 8/10 in severity and interferes with activities of daily living such as putting on shoes and socks, walking two blocks, and getting up and down from a chair and toilet.  Knee osteoarthritis outcome score is 8.1 also left psoriasis

## 2024-07-24 NOTE — PHYSICAL EXAM
[de-identified] : Knee ranges 5-115 degrees with pain and crepitus stable to varus, valgus, anterior and posterior stress neurovascularly intact distally no pain with hip range of motion negative straight leg raise no effusion, synovitis, or edema or erythema skin intact both [de-identified] : ap,pa,lateral, sunrise both knees multiple views of the knees show severe arthritis with bone on bone contact, verónica-articular osteophytes, subchondral sclerosis and cysts

## 2024-07-24 NOTE — DISCUSSION/SUMMARY
[de-identified] : Patient comes in with more than 6 months of right knee pain atraumatic in onset.  Patient has tried greater than 6 months of nsaids, physical therapy and doctor directed exercises and injections.  Patient continues to have pain that is 8/10 in severity and interferes with activities of daily living such as putting on shoes and socks, walking two blocks, and getting up and down from a chair and toilet.  Knee osteoarthritis outcome score is 8.1 [place]

## 2024-08-02 ENCOUNTER — NON-APPOINTMENT (OUTPATIENT)
Age: 64
End: 2024-08-02

## 2024-09-03 ENCOUNTER — APPOINTMENT (OUTPATIENT)
Dept: HEART AND VASCULAR | Facility: CLINIC | Age: 64
End: 2024-09-03
Payer: MEDICARE

## 2024-09-03 PROCEDURE — 36415 COLL VENOUS BLD VENIPUNCTURE: CPT

## 2024-09-04 LAB
ALBUMIN SERPL ELPH-MCNC: 4.3 G/DL
ALP BLD-CCNC: 136 U/L
ALT SERPL-CCNC: 26 U/L
ANION GAP SERPL CALC-SCNC: 14 MMOL/L
AST SERPL-CCNC: 18 U/L
BILIRUB SERPL-MCNC: 0.7 MG/DL
BUN SERPL-MCNC: 16 MG/DL
CALCIUM SERPL-MCNC: 9.5 MG/DL
CHLORIDE SERPL-SCNC: 103 MMOL/L
CO2 SERPL-SCNC: 24 MMOL/L
CREAT SERPL-MCNC: 0.9 MG/DL
EGFR: 95 ML/MIN/1.73M2
GLUCOSE SERPL-MCNC: 115 MG/DL
POTASSIUM SERPL-SCNC: 4.5 MMOL/L
PROT SERPL-MCNC: 7.2 G/DL
SODIUM SERPL-SCNC: 141 MMOL/L

## 2024-09-08 ENCOUNTER — NON-APPOINTMENT (OUTPATIENT)
Age: 64
End: 2024-09-08

## 2024-09-09 ENCOUNTER — APPOINTMENT (OUTPATIENT)
Dept: HEART AND VASCULAR | Facility: CLINIC | Age: 64
End: 2024-09-09

## 2024-09-09 ENCOUNTER — NON-APPOINTMENT (OUTPATIENT)
Age: 64
End: 2024-09-09

## 2024-09-09 VITALS
OXYGEN SATURATION: 97 % | BODY MASS INDEX: 40.48 KG/M2 | HEIGHT: 65 IN | HEART RATE: 74 BPM | SYSTOLIC BLOOD PRESSURE: 144 MMHG | WEIGHT: 243 LBS | RESPIRATION RATE: 18 BRPM | DIASTOLIC BLOOD PRESSURE: 80 MMHG

## 2024-09-09 PROCEDURE — 93000 ELECTROCARDIOGRAM COMPLETE: CPT

## 2024-09-09 PROCEDURE — 99215 OFFICE O/P EST HI 40 MIN: CPT

## 2024-09-12 ENCOUNTER — RESULT REVIEW (OUTPATIENT)
Age: 64
End: 2024-09-12

## 2024-09-23 ENCOUNTER — APPOINTMENT (OUTPATIENT)
Dept: HEART AND VASCULAR | Facility: CLINIC | Age: 64
End: 2024-09-23
Payer: MEDICARE

## 2024-09-23 PROCEDURE — 36415 COLL VENOUS BLD VENIPUNCTURE: CPT

## 2024-09-24 LAB
ALBUMIN SERPL ELPH-MCNC: 4.4 G/DL
ALP BLD-CCNC: 143 U/L
ALT SERPL-CCNC: 27 U/L
ANION GAP SERPL CALC-SCNC: 15 MMOL/L
AST SERPL-CCNC: 24 U/L
BASOPHILS # BLD AUTO: 0.06 K/UL
BASOPHILS NFR BLD AUTO: 0.6 %
BILIRUB SERPL-MCNC: 0.8 MG/DL
BUN SERPL-MCNC: 15 MG/DL
CALCIUM SERPL-MCNC: 9.5 MG/DL
CHLORIDE SERPL-SCNC: 103 MMOL/L
CHOLEST SERPL-MCNC: 145 MG/DL
CO2 SERPL-SCNC: 25 MMOL/L
CREAT SERPL-MCNC: 0.9 MG/DL
EGFR: 95 ML/MIN/1.73M2
EOSINOPHIL # BLD AUTO: 0.3 K/UL
EOSINOPHIL NFR BLD AUTO: 2.8 %
ESTIMATED AVERAGE GLUCOSE: 120 MG/DL
GLUCOSE SERPL-MCNC: 80 MG/DL
HBA1C MFR BLD HPLC: 5.8 %
HCT VFR BLD CALC: 50.5 %
HDLC SERPL-MCNC: 45 MG/DL
HGB BLD-MCNC: 16.4 G/DL
IMM GRANULOCYTES NFR BLD AUTO: 1.8 %
LDLC SERPL CALC-MCNC: 82 MG/DL
LYMPHOCYTES # BLD AUTO: 2.46 K/UL
LYMPHOCYTES NFR BLD AUTO: 22.7 %
MAN DIFF?: NORMAL
MCHC RBC-ENTMCNC: 30 PG
MCHC RBC-ENTMCNC: 32.5 GM/DL
MCV RBC AUTO: 92.5 FL
MONOCYTES # BLD AUTO: 0.97 K/UL
MONOCYTES NFR BLD AUTO: 8.9 %
NEUTROPHILS # BLD AUTO: 6.87 K/UL
NEUTROPHILS NFR BLD AUTO: 63.2 %
NONHDLC SERPL-MCNC: 100 MG/DL
NT-PROBNP SERPL-MCNC: 103 PG/ML
PLATELET # BLD AUTO: 325 K/UL
POTASSIUM SERPL-SCNC: 4.2 MMOL/L
PROT SERPL-MCNC: 7.2 G/DL
RBC # BLD: 5.46 M/UL
RBC # FLD: 13.4 %
SODIUM SERPL-SCNC: 143 MMOL/L
TRIGL SERPL-MCNC: 94 MG/DL
WBC # FLD AUTO: 10.85 K/UL

## 2024-09-25 ENCOUNTER — RESULT REVIEW (OUTPATIENT)
Age: 64
End: 2024-09-25

## 2024-09-26 ENCOUNTER — NON-APPOINTMENT (OUTPATIENT)
Age: 64
End: 2024-09-26

## 2024-10-03 ENCOUNTER — APPOINTMENT (OUTPATIENT)
Dept: ORTHOPEDIC SURGERY | Facility: HOSPITAL | Age: 64
End: 2024-10-03

## 2024-10-03 ENCOUNTER — RESULT REVIEW (OUTPATIENT)
Age: 64
End: 2024-10-03

## 2024-10-03 RX ORDER — DOXYCYCLINE HYCLATE 100 MG/1
100 TABLET ORAL
Qty: 20 | Refills: 0 | Status: ACTIVE | COMMUNITY
Start: 2024-10-03 | End: 1900-01-01

## 2024-10-11 ENCOUNTER — APPOINTMENT (OUTPATIENT)
Dept: ORTHOPEDIC SURGERY | Facility: CLINIC | Age: 64
End: 2024-10-11
Payer: MEDICARE

## 2024-10-11 VITALS
HEART RATE: 68 BPM | WEIGHT: 241 LBS | BODY MASS INDEX: 40.15 KG/M2 | OXYGEN SATURATION: 96 % | SYSTOLIC BLOOD PRESSURE: 104 MMHG | HEIGHT: 65 IN | DIASTOLIC BLOOD PRESSURE: 67 MMHG

## 2024-10-11 DIAGNOSIS — M17.0 BILATERAL PRIMARY OSTEOARTHRITIS OF KNEE: ICD-10-CM

## 2024-10-11 DIAGNOSIS — Z09 ENCOUNTER FOR FOLLOW-UP EXAMINATION AFTER COMPLETED TREATMENT FOR CONDITIONS OTHER THAN MALIGNANT NEOPLASM: ICD-10-CM

## 2024-10-11 PROCEDURE — 99213 OFFICE O/P EST LOW 20 MIN: CPT

## 2024-10-23 ENCOUNTER — NON-APPOINTMENT (OUTPATIENT)
Age: 64
End: 2024-10-23

## 2024-10-29 ENCOUNTER — TRANSCRIPTION ENCOUNTER (OUTPATIENT)
Age: 64
End: 2024-10-29

## 2024-10-29 ENCOUNTER — RESULT REVIEW (OUTPATIENT)
Age: 64
End: 2024-10-29

## 2024-10-29 ENCOUNTER — APPOINTMENT (OUTPATIENT)
Dept: ORTHOPEDIC SURGERY | Facility: HOSPITAL | Age: 64
End: 2024-10-29
Payer: MEDICARE

## 2024-10-29 PROCEDURE — 20985 CPTR-ASST DIR MS PX: CPT

## 2024-10-29 PROCEDURE — 27447 TOTAL KNEE ARTHROPLASTY: CPT | Mod: RT

## 2024-10-30 ENCOUNTER — TRANSCRIPTION ENCOUNTER (OUTPATIENT)
Age: 64
End: 2024-10-30

## 2024-11-14 NOTE — PROGRESS NOTE ADULT - ASSESSMENT
This is a 60 y/o male with a PMHx of HTN, HLD, obesity, psorasis, cholecystectomy and known CAD (s/p PTCA/RADHA LAD & D1) presented to his cardiologist with chest pain. He underwent a cardiac cath which revealed 3-vessel CAD. Patient was referred to [facility] for surgical evaluation and deemed a good surgical candidate. On 11/6/2019 the patient underwent an OPCABx3. His OR course was uncomplicated and the pt arrived to the [facility] extubated. On POD1-5 patient had a small air leak in his pleural CT and underwent a few waterseal trials. On POD 6 the airleak was gone and the chest tube was removed and the patient was discharged later that day. On today, 11/14/2019, the patient presented to [facility] with complaints of severe anxiety and SOB and in the ED they would a K>6, BUN/Cr 36/3.22, AST/ALT >5000, Lactate 13 and bleeding was noted from sternotomy incision. The patient was transferred to the CTICU emergently for evaluation and found to have cardiogenic shock and multiorgan failure. In the CTICU Patient was found to have a circumferential pericardial effusion and the sternal incision was opened at the bedside (drained 700cc of fluid) and a wound vac was placed. Patient required emergent hemodialysis due to persistent acidosis/hyperkalemia and he improved clinically. throughout the rest of his ICU course, he continued to have GAUTAM throughout his ICU course. He was then transferred to the floor on 11/19/2019 with a cowart and wouldn't vac in place (last . Nephrology is following the patient and continuing to trend his BUN/Cr and urine output.       Plan:   Neurovascular:   -     Cardiovascular: Hemodynamically stable. HR controlled.  -BP. HR. EKG. TTE. Cardiac Panel. Lipid Panel. BNP.   -ASA. Plavix. BBlocker. Statin.      Respiratory: 02 Sat = 98% on RA.  -If on oxygen wean to RA from for O2 Sat > 93%.  -Encourage C+DB and Use of IS 10x / hr while awake.  -CXR.    GI: Stable.  -NPO after MN.  -PPX.  -PO Diet: encourage oral intake (per nephrology)     Renal / :   -GAUTAM: nephrology is following (in the setting of shock and metabolic acidosis with refractory hyperkalemia)- pt has had one session of HD but is not needed again yet.   -Monitor renal function: BUN/Cr 83/10 (Cr trended up from yesterday which was 80/9.49)  -Cowart in place, putting out about 100-150cc per hour.   -Hyponatremia: continue to trend sodium- Na 129 <-- 127 <--- 124, stop 0.9 NaCl at 75cc/ hr (per nephorology), monitor BMP Q6 hours     Endocrine:  no hx of DM or thyroid disease     Hematologic:  -CBC: H/H 10.5/33.4  -Coagulation Panel.    ID:  -Tempature.  -CBC.  -Observe for SIRS/Sepsis Syndrome.    Prophylaxis:  -DVT prophylaxis with 5000 SubQ Heparin q8h.  -SCD's    Disposition:  -ICU for frequent monitoring. This is a 60 y/o male with a PMHx of HTN, HLD, obesity, psorasis, cholecystectomy and known CAD (s/p PTCA/RADHA LAD & D1) presented to his cardiologist with chest pain. He underwent a cardiac cath which revealed 3-vessel CAD. Patient was referred to [facility] for surgical evaluation and deemed a good surgical candidate. On 11/6/2019 the patient underwent an OPCABx3. His OR course was uncomplicated and the pt arrived to the [facility] extubated. On POD1-5 patient had a small air leak in his pleural CT and underwent a few waterseal trials. On POD 6 the airleak was gone and the chest tube was removed and the patient was discharged later that day. On today, 11/14/2019, the patient presented to [facility] with complaints of severe anxiety and SOB and in the ED they would a K>6, BUN/Cr 36/3.22, AST/ALT >5000, Lactate 13 and bleeding was noted from sternotomy incision. The patient was transferred to the CTICU emergently for evaluation and found to have cardiogenic shock and multiorgan failure. In the CTICU Patient was found to have a circumferential pericardial effusion and the sternal incision was opened at the bedside (drained 700cc of fluid) and a wound vac was placed. Patient required emergent hemodialysis due to persistent acidosis/hyperkalemia and he improved clinically. throughout the rest of his ICU course, he continued to have GAUTAM throughout his ICU course. He was then transferred to the floor on 11/19/2019 with a cowart and wouldn't vac in place (last changed yesterday 11/20/19). Nephrology is following the patient and continuing to trend his GAUTAM.       Plan:   Neurovascular: Stable  -PRNs: Acetaminophen, Oxycodone 5mg    Cardiovascular: S/p OPCABx3 on 11/6/2019   -Monitor: HR, BP, tele   -CAD: ASA, Plavix     Respiratory: Stable   -Sating well on RA   -Encourage use of IS 10x / hr while awake.  -CXR: pending official read, no obvious PTX or acute pathology - f/u with results     GI: Stable.  -PPX: Pantoprazole 40mg   -PO Diet: DASH: encourage oral intake (per nephrology)     Renal / :   -GAUTAM: nephrology is following (in the setting of shock and metabolic acidosis with refractory hyperkalemia)- pt has had one session of HD but is not needed again yet.   -Monitor renal function: BUN/Cr 83/10 (Cr trended up from yesterday which was 80/9.49)  -Cowart in place, putting out about 100-150cc per hour.   -Hyponatremia: continue to trend sodium- Na 129 <-- 127 <--- 124, stop 0.9 NaCl at 75cc/ hr (per nephorology), monitor BMP Q6 hours     Endocrine:  no hx of DM or thyroid disease     Hematologic: Stable   -CBC: H/H 10.5/33.4    ID:  -Temperature: afebrile   -CBC: WBC- 17.3 (trending down from 18.55)   -Observe for SIRS/Sepsis Syndrome.  -Cefazolin (for sternal wound)    Prophylaxis:  -DVT prophylaxis with 7500 SubQ Heparin q8h.  -SCD's    Disposition:  -Discharge the patient home when medically ready This is a 60 y/o male with a PMHx of HTN, HLD, obesity, psorasis, cholecystectomy and known CAD (s/p PTCA/RADHA LAD & D1) presented to his cardiologist with chest pain. He underwent a cardiac cath which revealed 3-vessel CAD. Patient was referred to [facility] for surgical evaluation and deemed a good surgical candidate. On 11/6/2019 the patient underwent an OPCABx3. His OR course was uncomplicated and the pt arrived to the [facility] extubated. On POD1-5 patient had a small air leak in his pleural CT and underwent a few waterseal trials. On POD 6 the airleak was gone and the chest tube was removed and the patient was discharged later that day. On today, 11/14/2019, the patient presented to [facility] with complaints of severe anxiety and SOB and in the ED they would a K>6, BUN/Cr 36/3.22, AST/ALT >5000, Lactate 13 and bleeding was noted from sternotomy incision. The patient was transferred to the CTICU emergently for evaluation and found to have cardiogenic shock and multiorgan failure. In the CTICU Patient was found to have a circumferential pericardial effusion and the sternal incision was opened at the bedside (drained 700cc of fluid) and a wound vac was placed. Patient required emergent hemodialysis due to persistent acidosis/hyperkalemia and he improved clinically. throughout the rest of his ICU course, he continued to have GAUTAM throughout his ICU course. He was then transferred to the floor on 11/19/2019 with a cowart and wound vac in place (last changed yesterday 11/20/19). Today on 11/2-/2019, patient is hemodynamically stable and nephrology is following the patient and continuing to trend his GAUTAM.       Plan:   Neurovascular: Stable  -PRNs: Acetaminophen, Oxycodone 5mg    Cardiovascular: S/p OPCABx3 on 11/6/2019   -Monitor: HR, BP, tele   -CAD: ASA, Plavix     Respiratory: Stable   -Sating well on RA   -Encourage use of IS 10x / hr while awake.  -CXR: pending official read, no obvious PTX or acute pathology - f/u with results     GI: Stable.  -PPX: Pantoprazole 40mg   -PO Diet: DASH: encourage oral intake (per nephrology)     Renal / :   -GAUTAM: nephrology is following (in the setting of shock and metabolic acidosis with refractory hyperkalemia)- pt has had one session of HD but is not needed again yet.   -Monitor renal function: BUN/Cr 83/10 (Cr trended up from yesterday which was 80/9.49)  -Cowart in place, putting out about 100-150cc per hour.   -Hyponatremia: continue to trend sodium- Na 129 <-- 127 <--- 124, stop 0.9 NaCl at 75cc/ hr (per nephrology) monitor BMP Q6 hours     Endocrine:  no hx of DM or thyroid disease     Hematologic: Stable   -CBC: H/H 10.5/33.4    ID:  -Temperature: afebrile   -CBC: WBC- 17.3 (trending down from 18.55)   -Observe for SIRS/Sepsis Syndrome.  -Cefazolin (for sternal wound)    Prophylaxis:  -DVT prophylaxis with 7500 SubQ Heparin q8h.  -SCD's    Disposition:  -Discharge the patient home when medically ready This is a 58 y/o male with a PMHx of HTN, HLD, obesity, psorasis, cholecystectomy and known CAD (s/p PTCA/RADHA LAD & D1) presented to his cardiologist with chest pain. He underwent a cardiac cath which revealed 3-vessel CAD. Patient was referred to [facility] for surgical evaluation and deemed a good surgical candidate. On 11/6/2019 the patient underwent an OPCABx3. His OR course was uncomplicated and the pt arrived to the [facility] extubated. On POD1-5 patient had a small air leak in his pleural CT and underwent a few waterseal trials. On POD 6 the airleak was gone and the chest tube was removed and the patient was discharged later that day. On today, 11/14/2019, the patient presented to [facility] with complaints of severe anxiety and SOB and in the ED they found K>6, BUN/Cr 36/3.22, AST/ALT >5000, Lactate 13 and bleeding was noted from sternotomy incision. The patient was transferred to the CTICU emergently for evaluation and found to have cardiogenic shock and multiorgan failure. In the CTICU Patient was found to have a circumferential pericardial effusion and the sternal incision was opened at the bedside (drained 700cc of fluid) and a wound vac was placed. Patient required emergent hemodialysis due to persistent acidosis/hyperkalemia and he improved clinically. Throughout the rest of his ICU course, he continued to have GAUTAM throughout his ICU course. He was then transferred to the floor on 11/19/2019 with a cowart and wound vac in place (last changed yesterday 11/20/19). Today on 11/2-/2019, patient is hemodynamically stable and nephrology is following the patient and continuing to trend his GAUTAM.       Plan:   Neurovascular: Stable  -PRNs: Oxycodone 5mg    Cardiovascular: S/p OPCABx3 on 11/6/2019   -Monitor: HR, BP, tele   -CAD: ASA, Plavix - Holding B-blocker for permissive HTN. Holding statin secondary to increased LFTs.     Respiratory: Stable   -Sating well on RA   -Encourage use of IS 10x / hr while awake.  -CXR: pending official read, no obvious PTX or acute pathology - f/u with results     GI: Stable.  -PPX: Pantoprazole 40mg   -PO Diet: DASH -encourage oral intake (per nephrology)   -Pt having regular bowel movements - no need for bowel regimen at this time     Renal / :   -GAUTAM: nephrology is following (in the setting of shock and metabolic acidosis with refractory hyperkalemia)- pt has had one session of HD but is not needed again yet.   -Monitor renal function: BUN/Cr 83/10 (Cr trended up from yesterday which was 80/9.49)  -Cowart in place, putting out about 100-150cc per hour.   -Hyponatremia: continue to trend sodium- Na 129 <-- 127 <--- 124, stop 0.9 NaCl at 75cc/ hr (per nephrology) monitor BMP Q6 hours     Endocrine:  no hx of DM or thyroid disease   -A1C: 5.1  -TSH: 1.34    Hematologic: Stable   -CBC: H/H 10.5/33.4    ID:  -Temperature: afebrile   -CBC: WBC- 17.3 (trending down from 18.55)   -Observe for SIRS/Sepsis Syndrome.  -C/w Cefazolin (Empiric Abx for sternal wound) - surgical swab (11/14/19) and blood cultures (11/14/19) with no growth to date- will confirm with Dr. Naik when abx can be discontinued.     Prophylaxis:  -DVT prophylaxis with 7500 SubQ Heparin q8h.  -SCD's    Disposition:  -Discharge the patient home when medically ready This is a 60 y/o male with a PMHx of HTN, HLD, obesity, psorasis, cholecystectomy and known CAD (s/p PTCA/RADHA LAD & D1) presented to his cardiologist with chest pain. He underwent a cardiac cath which revealed 3-vessel CAD. Patient was referred to [facility] for surgical evaluation and deemed a good surgical candidate. On 11/6/2019 the patient underwent an OPCABx3. His OR course was uncomplicated and the pt arrived to the [facility] extubated. On POD1-5 patient had a small air leak in his pleural CT and underwent a few waterseal trials. On POD 6 the airleak was gone and the chest tube was removed and the patient was discharged later that day. On today, 11/14/2019, the patient presented to [facility] with complaints of severe anxiety and SOB and in the ED they found K>6, BUN/Cr 36/3.22, AST/ALT >5000, Lactate 13 and bleeding was noted from sternotomy incision. The patient was transferred to the CTICU emergently for evaluation and found to have cardiogenic shock and multiorgan failure. In the CTICU Patient was found to have a circumferential pericardial effusion and the sternal incision was opened at the bedside (drained 700cc of fluid) and a wound vac was placed. Patient required emergent hemodialysis due to persistent acidosis/hyperkalemia and he improved clinically. Throughout the rest of his ICU course, he continued to have GAUTAM throughout his ICU course. He was then transferred to the floor on 11/19/2019 with a cowart and wound vac in place (last changed yesterday 11/20/19). Today on 11/2-/2019, patient is hemodynamically stable and nephrology is following the patient and continuing to trend his GAUTAM.       Plan:   Neurovascular: Stable  -PRNs: Oxycodone 5mg    Cardiovascular: S/p OPCABx3 on 11/6/2019   -Monitor: HR, BP, tele   -CAD: ASA, Plavix - Holding B-blocker for permissive HTN. Holding statin secondary to increased LFTs.     Respiratory: Stable   -Sating well on RA   -Encourage use of IS 10x / hr while awake.  -CXR: pending official read, no obvious PTX or acute pathology - f/u with results     GI: Stable.  -PPX: Pantoprazole 40mg   -PO Diet: DASH -encourage oral intake (per nephrology)   -Pt having regular bowel movements - no need for bowel regimen at this time     Renal / :   -GAUTAM: nephrology is following (in the setting of shock and metabolic acidosis with refractory hyperkalemia)- pt has had one session of HD but is not needed again yet.   -Monitor renal function: BUN/Cr 83/10 (Cr trended up from yesterday which was 80/9.49)  -Cowart in place, putting out about 100-150cc per hour.   -Hyponatremia: continue to trend sodium- Na 129 <-- 127 <--- 124, stop 0.9 NaCl at 75cc/ hr (per nephrology) monitor BMP Q6 hours     Endocrine:  no hx of DM or thyroid disease   -A1C: 5.1  -TSH: 1.34    Hematologic: Stable   -CBC: H/H 10.5/33.4    ID:  -Temperature: afebrile   -CBC: WBC- 17.3 (trending down from 18.55)   -Observe for SIRS/Sepsis Syndrome.  -C/w Cefazolin (Empiric Abx for deep sternal wound) - surgical swab (11/14/19) and blood cultures (11/14/19) with no growth to date- PER DR. EUGENE please c/w Cefazolin indefinitely     Prophylaxis:  -DVT prophylaxis with 7500 SubQ Heparin q8h.  -SCD's    Disposition:  -Discharge the patient home when medically ready This is a 58 y/o male with a PMHx of HTN, HLD, obesity, psorasis, cholecystectomy and known CAD (s/p PTCA/RADHA LAD & D1) presented to his cardiologist with chest pain. He underwent a cardiac cath which revealed 3-vessel CAD. Patient was referred to [facility] for surgical evaluation and deemed a good surgical candidate. On 11/6/2019 the patient underwent an OPCABx3 (EF45%. His OR course was uncomplicated and the pt arrived to the [facility] extubated. On POD1-5 patient had a small air leak in his pleural CT and underwent a few waterseal trials. On POD 6 the airleak resolved and the chest tube was removed and the patient was discharged later that day. On today, 11/14/2019, the patient presented to [facility] with complaints of severe anxiety and SOB and in the ED they found K>6, BUN/Cr 36/3.22, AST/ALT >5000, Lactate 13 and bleeding was noted from sternotomy incision. The patient was transferred to the CTICU emergently for evaluation and found to have cardiogenic shock and multiorgan failure. In the CTICU Patient was found to have a circumferential pericardial effusion and the sternal incision was opened at the bedside (drained 700cc of fluid) and a wound vac was placed. Patient required emergent hemodialysis due to persistent acidosis/hyperkalemia and he improved clinically. Throughout the rest of his ICU course, he continued to have GAUTAM throughout his ICU course. He was then transferred to the floor on 11/19/2019 with a cowart and wound vac in place (last changed yesterday 11/20/19). Today on 11/2-/2019, patient is hemodynamically stable and nephrology is following the patient and continuing to trend his GAUTAM.       Plan:   Neurovascular: Stable  -PRNs: Oxycodone 5mg    Cardiovascular: S/p OPCABx3 on 11/6/2019   -Monitor: HR, BP, tele   -CAD: ASA, Plavix - Holding B-blocker for permissive HTN. Holding statin secondary to increased LFTs.     Respiratory: Stable   -Sating well on RA   -Encourage use of IS 10x / hr while awake.  -CXR: pending official read, no obvious PTX or acute pathology - f/u with results     GI: Stable.  -PPX: Pantoprazole 40mg   -PO Diet: DASH -encourage oral intake (per nephrology)   -Pt having regular bowel movements - no need for bowel regimen at this time     Renal / :   -GAUTAM: nephrology is following (in the setting of shock and metabolic acidosis with refractory hyperkalemia)- pt has had one session of HD but is not needed again yet.   -Monitor renal function: BUN/Cr 83/10 (Cr trended up from yesterday which was 80/9.49)  -Cowart in place, putting out about 100-150cc per hour.   -Hyponatremia: continue to trend sodium- Na 129 <-- 127 <--- 124, stop 0.9 NaCl at 75cc/ hr (per nephrology) monitor BMP Q6 hours     Endocrine:  no hx of DM or thyroid disease   -A1C: 5.1  -TSH: 1.34    Hematologic: Stable   -CBC: H/H 10.5/33.4    ID:  -Temperature: afebrile   -CBC: WBC- 17.3 (trending down from 18.55)   -Observe for SIRS/Sepsis Syndrome.  -C/w Cefazolin (Empiric Abx for deep sternal wound) - surgical swab (11/14/19) and blood cultures (11/14/19) with no growth to date- PER DR. EUGENE please c/w Cefazolin indefinitely     Prophylaxis:  -DVT prophylaxis with 7500 SubQ Heparin q8h.  -SCD's    Disposition:  -Discharge the patient home when medically ready This is a 58 y/o male with a PMHx of HTN, HLD, obesity, psorasis, cholecystectomy and known CAD (s/p PTCA/RADHA LAD & D1) presented to his cardiologist with chest pain. He underwent a cardiac cath which revealed 3-vessel CAD. Patient was referred to [facility] for surgical evaluation and deemed a good surgical candidate. On 11/6/2019 the patient underwent an OPCABx3 (EF45%. His OR course was uncomplicated and the pt arrived to the [facility] extubated. On POD1-5 patient had a small air leak in his pleural CT and on POD6 it resolved and chest tube was removed. Later that day he was discharged later that day. On today, 11/14/2019, the patient presented to [facility] with complaints of severe anxiety and SOB and in the ED they found K>6, BUN/Cr 36/3.22, AST/ALT >5000, Lactate 13 and bleeding from the sternotomy incision. He was transferred to the CTICU emergently for evaluation and found to have cardiogenic shock and multiorgan failure. In the CTICU Patient was found to have a circumferential pericardial effusion and the sternal incision was opened at the bedside (drained 700cc of fluid) and a wound vac was placed. Patient required emergent hemodialysis due to persistent acidosis/hyperkalemia and he improved clinically. Throughout the rest of his ICU course, he continued to have GAUTAM throughout his ICU course. He was then transferred to the floor on 11/19/2019 with a cowart and wound vac in place (last changed yesterday 11/20/19). Today on 11/2-/2019, patient is hemodynamically stable and nephrology is following the patient and continuing to trend his GAUTAM.       Plan:   Neurovascular: Stable  -PRNs: Oxycodone 5mg    Cardiovascular: S/p OPCABx3 on 11/6/2019   -Monitor: HR, BP, tele   -CAD: ASA, Plavix - Holding B-blocker for permissive HTN. Holding statin secondary to increased LFTs.     Respiratory: Stable   -Sating well on RA   -Encourage use of IS 10x / hr while awake.  -CXR: pending official read, no obvious PTX or acute pathology - f/u with results     GI: Stable.  -PPX: Pantoprazole 40mg   -PO Diet: DASH -encourage oral intake (per nephrology)   -Pt having regular bowel movements - no need for bowel regimen at this time     Renal / :   -GAUTAM: nephrology is following (in the setting of shock and metabolic acidosis with refractory hyperkalemia)- pt has had one session of HD but is not needed again yet.   -Monitor renal function: BUN/Cr 83/10 (Cr trended up from yesterday which was 80/9.49)  -Cowart in place, putting out about 100-150cc per hour.   -Hyponatremia: continue to trend sodium- Na 129 <-- 127 <--- 124, stop 0.9 NaCl at 75cc/ hr (per nephrology) monitor BMP Q6 hours     Endocrine:  no hx of DM or thyroid disease   -A1C: 5.1  -TSH: 1.34    Hematologic: Stable   -CBC: H/H 10.5/33.4    ID:  -Temperature: afebrile   -CBC: WBC- 17.3 (trending down from 18.55)   -Observe for SIRS/Sepsis Syndrome.  -C/w Cefazolin (Empiric Abx for deep sternal wound) - surgical swab (11/14/19) and blood cultures (11/14/19) with no growth to date- PER DR. EUGENE please c/w Cefazolin indefinitely     Prophylaxis:  -DVT prophylaxis with 7500 SubQ Heparin q8h.  -SCD's    Disposition:  -Discharge the patient home when medically ready declines

## 2024-11-27 ENCOUNTER — APPOINTMENT (OUTPATIENT)
Dept: ORTHOPEDIC SURGERY | Facility: CLINIC | Age: 64
End: 2024-11-27
Payer: MEDICARE

## 2024-11-27 VITALS
DIASTOLIC BLOOD PRESSURE: 71 MMHG | WEIGHT: 241 LBS | HEART RATE: 72 BPM | BODY MASS INDEX: 40.15 KG/M2 | HEIGHT: 65 IN | OXYGEN SATURATION: 95 % | SYSTOLIC BLOOD PRESSURE: 116 MMHG

## 2024-11-27 DIAGNOSIS — Z96.651 PRESENCE OF RIGHT ARTIFICIAL KNEE JOINT: ICD-10-CM

## 2024-11-27 PROCEDURE — 99024 POSTOP FOLLOW-UP VISIT: CPT

## 2024-11-27 PROCEDURE — 73562 X-RAY EXAM OF KNEE 3: CPT | Mod: RT

## 2024-12-04 ENCOUNTER — APPOINTMENT (OUTPATIENT)
Dept: HEART AND VASCULAR | Facility: CLINIC | Age: 64
End: 2024-12-04
Payer: MEDICARE

## 2024-12-04 VITALS
BODY MASS INDEX: 40.82 KG/M2 | RESPIRATION RATE: 18 BRPM | HEART RATE: 80 BPM | OXYGEN SATURATION: 95 % | WEIGHT: 245 LBS | SYSTOLIC BLOOD PRESSURE: 116 MMHG | HEIGHT: 65 IN | DIASTOLIC BLOOD PRESSURE: 76 MMHG

## 2024-12-04 PROCEDURE — 99215 OFFICE O/P EST HI 40 MIN: CPT

## 2024-12-04 PROCEDURE — G2211 COMPLEX E/M VISIT ADD ON: CPT

## 2024-12-08 RX ORDER — TORSEMIDE 20 MG/1
20 TABLET ORAL
Qty: 105 | Refills: 2 | Status: ACTIVE | COMMUNITY
Start: 2024-12-08 | End: 1900-01-01

## 2025-01-10 DIAGNOSIS — Z96.651 PRESENCE OF RIGHT ARTIFICIAL KNEE JOINT: ICD-10-CM

## 2025-01-24 ENCOUNTER — APPOINTMENT (OUTPATIENT)
Dept: HEART AND VASCULAR | Facility: CLINIC | Age: 65
End: 2025-01-24
Payer: MEDICARE

## 2025-01-24 PROCEDURE — 93306 TTE W/DOPPLER COMPLETE: CPT

## 2025-02-12 ENCOUNTER — APPOINTMENT (OUTPATIENT)
Dept: HEART AND VASCULAR | Facility: CLINIC | Age: 65
End: 2025-02-12

## 2025-02-27 ENCOUNTER — APPOINTMENT (OUTPATIENT)
Dept: HEART AND VASCULAR | Facility: CLINIC | Age: 65
End: 2025-02-27
Payer: MEDICARE

## 2025-02-27 ENCOUNTER — NON-APPOINTMENT (OUTPATIENT)
Age: 65
End: 2025-02-27

## 2025-02-27 VITALS
DIASTOLIC BLOOD PRESSURE: 77 MMHG | HEART RATE: 76 BPM | SYSTOLIC BLOOD PRESSURE: 145 MMHG | BODY MASS INDEX: 39.77 KG/M2 | OXYGEN SATURATION: 97 % | WEIGHT: 239 LBS

## 2025-02-27 PROCEDURE — 99214 OFFICE O/P EST MOD 30 MIN: CPT

## 2025-02-27 RX ORDER — EMPAGLIFLOZIN 10 MG/1
10 TABLET, FILM COATED ORAL DAILY
Qty: 90 | Refills: 3 | Status: ACTIVE | COMMUNITY
Start: 2025-02-27 | End: 1900-01-01

## 2025-02-27 RX ORDER — SPIRONOLACTONE 25 MG/1
25 TABLET ORAL DAILY
Qty: 90 | Refills: 3 | Status: ACTIVE | COMMUNITY
Start: 2025-02-27 | End: 1900-01-01

## 2025-02-27 RX ORDER — AMLODIPINE BESYLATE 10 MG/1
10 TABLET ORAL
Qty: 90 | Refills: 3 | Status: ACTIVE | COMMUNITY
Start: 2025-02-27 | End: 1900-01-01

## 2025-03-10 ENCOUNTER — APPOINTMENT (OUTPATIENT)
Dept: CARDIOLOGY | Facility: CLINIC | Age: 65
End: 2025-03-10

## 2025-04-16 NOTE — PHYSICAL THERAPY INITIAL EVALUATION ADULT - BALANCE DISTURBANCE, IDENTIFIED IMPAIRMENT CONTRIBUTE, REHAB EVAL
Goal Outcome Evaluation:  Plan of Care Reviewed With: patient        Progress: improving  Outcome Evaluation: Probable DC today.  Up ad alvaro in room.  Still haveing loose BM, but pt states they are getting better.  Denies pain.                              pain/decreased strength

## 2025-05-01 ENCOUNTER — NON-APPOINTMENT (OUTPATIENT)
Age: 65
End: 2025-05-01

## 2025-06-26 ENCOUNTER — APPOINTMENT (OUTPATIENT)
Dept: HEART AND VASCULAR | Facility: CLINIC | Age: 65
End: 2025-06-26
Payer: MEDICARE

## 2025-06-26 VITALS
SYSTOLIC BLOOD PRESSURE: 131 MMHG | HEIGHT: 65 IN | WEIGHT: 239 LBS | BODY MASS INDEX: 39.82 KG/M2 | OXYGEN SATURATION: 97 % | DIASTOLIC BLOOD PRESSURE: 80 MMHG | HEART RATE: 75 BPM

## 2025-06-26 PROCEDURE — 36415 COLL VENOUS BLD VENIPUNCTURE: CPT

## 2025-06-26 PROCEDURE — 99214 OFFICE O/P EST MOD 30 MIN: CPT

## 2025-06-27 LAB
ALBUMIN SERPL ELPH-MCNC: 4.3 G/DL
ALP BLD-CCNC: 142 U/L
ALT SERPL-CCNC: 23 U/L
ANION GAP SERPL CALC-SCNC: 15 MMOL/L
AST SERPL-CCNC: 20 U/L
BASOPHILS # BLD AUTO: 0.09 K/UL
BASOPHILS NFR BLD AUTO: 0.8 %
BILIRUB SERPL-MCNC: 0.7 MG/DL
BUN SERPL-MCNC: 23 MG/DL
CALCIUM SERPL-MCNC: 9.6 MG/DL
CHLORIDE SERPL-SCNC: 100 MMOL/L
CHOLEST SERPL-MCNC: 145 MG/DL
CK SERPL-CCNC: 52 U/L
CO2 SERPL-SCNC: 22 MMOL/L
CREAT SERPL-MCNC: 1.03 MG/DL
CRP SERPL HS-MCNC: 6.55 MG/L
EGFRCR SERPLBLD CKD-EPI 2021: 81 ML/MIN/1.73M2
EOSINOPHIL # BLD AUTO: 0.3 K/UL
EOSINOPHIL NFR BLD AUTO: 2.6 %
ESTIMATED AVERAGE GLUCOSE: 120 MG/DL
GLUCOSE SERPL-MCNC: 102 MG/DL
HBA1C MFR BLD HPLC: 5.8 %
HCT VFR BLD CALC: 52.9 %
HDLC SERPL-MCNC: 43 MG/DL
HGB BLD-MCNC: 16.4 G/DL
IMM GRANULOCYTES NFR BLD AUTO: 1.9 %
LDLC SERPL-MCNC: 88 MG/DL
LYMPHOCYTES # BLD AUTO: 1.98 K/UL
LYMPHOCYTES NFR BLD AUTO: 17.2 %
MAN DIFF?: NORMAL
MCHC RBC-ENTMCNC: 30.5 PG
MCHC RBC-ENTMCNC: 31 G/DL
MCV RBC AUTO: 98.3 FL
MONOCYTES # BLD AUTO: 0.89 K/UL
MONOCYTES NFR BLD AUTO: 7.7 %
NEUTROPHILS # BLD AUTO: 8.03 K/UL
NEUTROPHILS NFR BLD AUTO: 69.8 %
NONHDLC SERPL-MCNC: 102 MG/DL
NT-PROBNP SERPL-MCNC: 43 PG/ML
PLATELET # BLD AUTO: 291 K/UL
POTASSIUM SERPL-SCNC: 4.2 MMOL/L
PROT SERPL-MCNC: 7.2 G/DL
RBC # BLD: 5.38 M/UL
RBC # FLD: 14 %
SODIUM SERPL-SCNC: 137 MMOL/L
TRIGL SERPL-MCNC: 73 MG/DL
TSH SERPL-ACNC: 2.09 UIU/ML
WBC # FLD AUTO: 11.51 K/UL

## 2025-07-01 ENCOUNTER — NON-APPOINTMENT (OUTPATIENT)
Age: 65
End: 2025-07-01

## 2025-07-08 RX ORDER — ATORVASTATIN CALCIUM 80 MG/1
80 TABLET, FILM COATED ORAL
Qty: 1 | Refills: 3 | Status: ACTIVE | COMMUNITY
Start: 2025-07-08 | End: 1900-01-01

## 2025-08-01 NOTE — PATIENT PROFILE ADULT - [id number]
Peripheral IV  Date/Time: 8/1/2025 7:45 AM  Inserted by: STACEY Barrios    Placement  Needle size: 16 G  Laterality: left  Location: forearm  Local anesthetic: injectable  Site prep: alcohol and chlorhexidine  Technique: anatomical landmarks  Attempts: 3         no

## 2025-09-17 ENCOUNTER — RX RENEWAL (OUTPATIENT)
Age: 65
End: 2025-09-17